# Patient Record
Sex: FEMALE | Race: BLACK OR AFRICAN AMERICAN | NOT HISPANIC OR LATINO | Employment: FULL TIME | URBAN - METROPOLITAN AREA
[De-identification: names, ages, dates, MRNs, and addresses within clinical notes are randomized per-mention and may not be internally consistent; named-entity substitution may affect disease eponyms.]

---

## 2017-01-09 ENCOUNTER — TELEPHONE (OUTPATIENT)
Dept: OBSTETRICS AND GYNECOLOGY | Facility: CLINIC | Age: 32
End: 2017-01-09

## 2017-01-09 DIAGNOSIS — Z30.011 ENCOUNTER FOR INITIAL PRESCRIPTION OF CONTRACEPTIVE PILLS: ICD-10-CM

## 2017-01-09 RX ORDER — LEVONORGESTREL AND ETHINYL ESTRADIOL 0.15-0.03
1 KIT ORAL DAILY
Qty: 90 TABLET | Refills: 0 | Status: SHIPPED | OUTPATIENT
Start: 2017-01-09 | End: 2018-04-26

## 2017-07-11 ENCOUNTER — PATIENT MESSAGE (OUTPATIENT)
Dept: INTERNAL MEDICINE | Facility: CLINIC | Age: 32
End: 2017-07-11

## 2017-07-24 ENCOUNTER — LAB VISIT (OUTPATIENT)
Dept: LAB | Facility: OTHER | Age: 32
End: 2017-07-24
Attending: NURSE PRACTITIONER
Payer: COMMERCIAL

## 2017-07-24 ENCOUNTER — OFFICE VISIT (OUTPATIENT)
Dept: OBSTETRICS AND GYNECOLOGY | Facility: CLINIC | Age: 32
End: 2017-07-24
Payer: COMMERCIAL

## 2017-07-24 VITALS
WEIGHT: 224.88 LBS | SYSTOLIC BLOOD PRESSURE: 118 MMHG | DIASTOLIC BLOOD PRESSURE: 74 MMHG | BODY MASS INDEX: 36.14 KG/M2 | HEIGHT: 66 IN

## 2017-07-24 DIAGNOSIS — Z11.3 SCREENING FOR STDS (SEXUALLY TRANSMITTED DISEASES): ICD-10-CM

## 2017-07-24 DIAGNOSIS — N89.8 VAGINAL DISCHARGE: ICD-10-CM

## 2017-07-24 DIAGNOSIS — Z31.69 ENCOUNTER FOR PRECONCEPTION CONSULTATION: ICD-10-CM

## 2017-07-24 DIAGNOSIS — N92.6 IRREGULAR PERIODS: ICD-10-CM

## 2017-07-24 DIAGNOSIS — Z01.419 WELL WOMAN EXAM WITH ROUTINE GYNECOLOGICAL EXAM: Primary | ICD-10-CM

## 2017-07-24 PROCEDURE — 36415 COLL VENOUS BLD VENIPUNCTURE: CPT

## 2017-07-24 PROCEDURE — 99999 PR PBB SHADOW E&M-EST. PATIENT-LVL III: CPT | Mod: PBBFAC,,, | Performed by: NURSE PRACTITIONER

## 2017-07-24 PROCEDURE — 86592 SYPHILIS TEST NON-TREP QUAL: CPT

## 2017-07-24 PROCEDURE — 87591 N.GONORRHOEAE DNA AMP PROB: CPT

## 2017-07-24 PROCEDURE — 86703 HIV-1/HIV-2 1 RESULT ANTBDY: CPT

## 2017-07-24 PROCEDURE — 87660 TRICHOMONAS VAGIN DIR PROBE: CPT

## 2017-07-24 PROCEDURE — 99395 PREV VISIT EST AGE 18-39: CPT | Mod: S$GLB,,, | Performed by: NURSE PRACTITIONER

## 2017-07-24 PROCEDURE — 87480 CANDIDA DNA DIR PROBE: CPT

## 2017-07-24 PROCEDURE — 80074 ACUTE HEPATITIS PANEL: CPT

## 2017-07-24 NOTE — PROGRESS NOTES
HISTORY OF PRESENT ILLNESS:    Kaylin Vargas is a 32 y.o. female, , Patient's last menstrual period was 2017 (approximate).,  presents for a routine exam, STD screening and irregular periods.  -Stopped her pills once month ago to try to conceive.   -Getting  in September.   -Since stopping her pills, her periods have been unpredictable (has only had one period!).    Past Medical History:   Diagnosis Date    Menstrual migraine     Obesity        PAST SURGICAL HISTORY  History reviewed. No pertinent surgical history.    MEDICATIONS AND ALLERGIES:  None  Review of patient's allergies indicates:  No Known Allergies    Family History   Problem Relation Age of Onset    Diabetes Father     Hypertension Father     Hypertension Mother     Kidney disease Mother     Heart disease Mother     Crohn's disease Sister     Aneurysm Maternal Grandmother     Heart disease Maternal Grandfather     Heart disease Paternal Grandmother     Breast cancer Neg Hx     Colon cancer Neg Hx     Ovarian cancer Neg Hx        Social History     Social History    Marital status: Single     Spouse name: N/A    Number of children: 2    Years of education: N/A     Occupational History    Not on file.     Social History Main Topics    Smoking status: Never Smoker    Smokeless tobacco: Never Used    Alcohol use Yes      Comment: Social.    Drug use: No    Sexual activity: Yes     Partners: Male     Birth control/ protection: Condom, OCP     Other Topics Concern    Not on file     Social History Narrative    No narrative on file       OB HISTORY: Number of vaginal deliveries:2    COMPREHENSIVE GYN HISTORY:  PAP History: Denies abnormal Paps. LAST PAP 16 NORMAL.  Infection History: Denies STDs. Denies PID.  Benign History: Denies uterine fibroids. Denies ovarian cysts. Denies endometriosis. Denies other conditions.  Cancer History: Denies cervical cancer. Denies uterine cancer or hyperplasia. Denies  "ovarian cancer. Denies vulvar cancer or pre-cancer. Denies vaginal cancer or pre-cancer. Denies breast cancer. Denies colon cancer.  Sexual Activity History: Reports currently being sexually active  Menstrual History: Irregular. Mod then light flow.   Dysmenorrhea History: Reports mild dysmenorrhea.   Contraception: None.     ROS:  GENERAL: + WEIGHT GAIN. No swelling. No fatigue. No fever.  CARDIOVASCULAR: No chest pain. No shortness of breath. No leg cramps.   NEUROLOGICAL: + MENSTRUAL MIGRAINES.  BREASTS: No pain. No lumps. No discharge.  ABDOMEN: No pain. No nausea. No vomiting. No diarrhea. No constipation.  REPRODUCTIVE: No abnormal bleeding.   VULVA: No pain. No lesions. No itching.  VAGINA: No relaxation. No itching. No odor. No discharge. No lesions.  URINARY: No incontinence. No nocturia. No frequency. No dysuria.    /74   Ht 5' 6" (1.676 m)   Wt 102 kg (224 lb 13.9 oz)   LMP 06/17/2017 (Approximate)   BMI 36.29 kg/m²     PE:  APPEARANCE: Well nourished, well developed, in no acute distress.  AFFECT: WNL, alert and oriented x 3.  SKIN: No acne or hirsutism.  NECK: Neck symmetric, without masses or thyromegaly.  NODES: No inguinal, cervical, axillary or femoral lymph node enlargement.  CHEST: Good respiratory effort.   ABDOMEN: Soft. No tenderness or masses. OBESE.  BREASTS: Symmetrical, no skin changes, visible lesions, palpable masses or nipple discharge bilaterally.  PELVIC: External female genitalia without lesions.  Female hair distribution. Adequate perineal body, Normal urethral meatus. Vagina moist and well rugated without lesions. MUCOID D/C present.  No significant cystocele or rectocele present. Cervix pink without lesions, discharge or tenderness. Uterus is 8 week size, regular, mobile and nontender. Adnexa without masses or tenderness. EXAM DIFFICULT DUE TO BODY HABITUS.  EXTREMITIES: No edema    DIAGNOSIS:  1. Well woman exam with routine gynecological exam    2. Irregular periods  "   3. Encounter for preconception consultation    4. Vaginal discharge    5. Screening for STDs (sexually transmitted diseases)        PLAN:    Orders Placed This Encounter    C. trachomatis/N. gonorrhoeae by AMP DNA Cervix    Vaginosis Screen by DNA Probe    HIV-1 and HIV-2 antibodies    RPR    Hepatitis panel, acute       COUNSELING:  The patient was counseled today on:  -to wait another 3 months to see if her periods will regulate, if not, to call and I will Rx Prometrium;  -procreation and future pregnancy, including optimal timing for becoming pregnant, use of LH kits and prenatal vitamins that contain folate and iron, avoidance of alcohol and caffeine, to review her immunization history and to update as necessary, to review her family history for potential inherited diseases that would require  screening;  -weight loss encouraged;  -STDs and prevention;  -A.C.S. Pap and pelvic exam guidelines (pap every 3 years);  -to follow up with her PCP for other health maintenance.    FOLLOW-UP with me pending test results and annually.

## 2017-07-25 LAB
C TRACH DNA SPEC QL NAA+PROBE: NOT DETECTED
CANDIDA RRNA VAG QL PROBE: NEGATIVE
G VAGINALIS RRNA GENITAL QL PROBE: NEGATIVE
HAV IGM SERPL QL IA: NEGATIVE
HBV CORE IGM SERPL QL IA: NEGATIVE
HBV SURFACE AG SERPL QL IA: NEGATIVE
HCV AB SERPL QL IA: NEGATIVE
HIV 1+2 AB+HIV1 P24 AG SERPL QL IA: NEGATIVE
N GONORRHOEA DNA SPEC QL NAA+PROBE: NOT DETECTED
RPR SER QL: NORMAL
T VAGINALIS RRNA GENITAL QL PROBE: NEGATIVE

## 2018-01-18 ENCOUNTER — TELEPHONE (OUTPATIENT)
Dept: DERMATOLOGY | Facility: CLINIC | Age: 33
End: 2018-01-18

## 2018-01-18 NOTE — TELEPHONE ENCOUNTER
LVM for pt to return call to r/s appt when MD is in clinic but still leave on NP schedule. (Rash/Bumps)

## 2018-01-25 ENCOUNTER — OFFICE VISIT (OUTPATIENT)
Dept: DERMATOLOGY | Facility: CLINIC | Age: 33
End: 2018-01-25
Payer: COMMERCIAL

## 2018-01-25 DIAGNOSIS — L70.0 ACNE VULGARIS: Primary | ICD-10-CM

## 2018-01-25 DIAGNOSIS — L73.0 ACNE KELOIDALIS NUCHAE: ICD-10-CM

## 2018-01-25 PROCEDURE — 99999 PR PBB SHADOW E&M-EST. PATIENT-LVL II: CPT | Mod: PBBFAC,,, | Performed by: DERMATOLOGY

## 2018-01-25 PROCEDURE — 99202 OFFICE O/P NEW SF 15 MIN: CPT | Mod: 25,S$GLB,, | Performed by: DERMATOLOGY

## 2018-01-25 PROCEDURE — 11900 INJECT SKIN LESIONS </W 7: CPT | Mod: S$GLB,,, | Performed by: DERMATOLOGY

## 2018-01-25 RX ORDER — DOXYCYCLINE 100 MG/1
TABLET ORAL
Qty: 30 TABLET | Refills: 2 | Status: SHIPPED | OUTPATIENT
Start: 2018-01-25 | End: 2018-07-26

## 2018-01-25 RX ORDER — ADAPALENE AND BENZOYL PEROXIDE 3; 25 MG/G; MG/G
GEL TOPICAL
Qty: 45 G | Refills: 3 | Status: SHIPPED | OUTPATIENT
Start: 2018-01-25 | End: 2018-10-03 | Stop reason: SDUPTHER

## 2018-01-25 NOTE — PATIENT INSTRUCTIONS

## 2018-01-25 NOTE — PROGRESS NOTES
Subjective:       Patient ID:  Kaylin Vargas is a 32 y.o. female who presents for   Chief Complaint   Patient presents with    Acne     face, chest and back    Lesion     back of neck     Method of birth control: none      Acne  - Initial  Affected locations: face, back and chest  Duration: 15 years  Signs / symptoms: draining and tender  Severity: moderate  Aggravated by: menses  Relieving factors/Treatments tried: OTC acne medication (benzoyl peroxide)  Improvement on treatment: no relief    Lesion  - Initial  Affected locations: scalp  Duration: 5 years  Signs / symptoms: irritated, oozing, bleeding and pain  Severity: moderate  Timing: constant  Aggravated by: nothing  Relieving factors/Treatments tried: OTC acne medication ([place]reeze)  Improvement on treatment: mild        Review of Systems   Constitutional: Negative for fever and chills.   Gastrointestinal: Negative for Sensitivity to oral antibiotics.   Genitourinary: Negative for irregular periods (not on ocp).   Skin: Positive for rash. Negative for itching, daily sunscreen use, activity-related sunscreen use and recent sunburn.        Objective:    Physical Exam   Constitutional: She appears well-developed and well-nourished. She is obese.  No distress.   Neurological: She is alert and oriented to person, place, and time. She is not disoriented.   Psychiatric: She has a normal mood and affect.   Skin:   Areas Examined (abnormalities noted in diagram):   Scalp / Hair Palpated and Inspected  Head / Face Inspection Performed  Neck Inspection Performed  Chest / Axilla Inspection Performed  Back Inspection Performed  RUE Inspected  LUE Inspection Performed                   Diagram Legend     Erythematous scaling macule/papule c/w actinic keratosis       Vascular papule c/w angioma      Pigmented verrucoid papule/plaque c/w seborrheic keratosis      Yellow umbilicated papule c/w sebaceous hyperplasia      Irregularly shaped tan macule c/w lentigo      1-2 mm smooth white papules consistent with Milia      Movable subcutaneous cyst with punctum c/w epidermal inclusion cyst      Subcutaneous movable cyst c/w pilar cyst      Firm pink to brown papule c/w dermatofibroma      Pedunculated fleshy papule(s) c/w skin tag(s)      Evenly pigmented macule c/w junctional nevus     Mildly variegated pigmented, slightly irregular-bordered macule c/w mildly atypical nevus      Flesh colored to evenly pigmented papule c/w intradermal nevus       Pink pearly papule/plaque c/w basal cell carcinoma      Erythematous hyperkeratotic cursted plaque c/w SCC      Surgical scar with no sign of skin cancer recurrence      Open and closed comedones      Inflammatory papules and pustules      Verrucoid papule consistent consistent with wart     Erythematous eczematous patches and plaques     Dystrophic onycholytic nail with subungual debris c/w onychomycosis     Umbilicated papule    Erythematous-base heme-crusted tan verrucoid plaque consistent with inflamed seborrheic keratosis     Erythematous Silvery Scaling Plaque c/w Psoriasis     See annotation      Assessment / Plan:        Acne vulgaris  EPIDUO FORTE 0.3-2.5 % GlwP; AAA face qhs  Dispense: 45 g; Refill: 3  Intralesional Kenalog 40mg/cc (1.0 cc total) injected into 1 lesions on the post scalp today after obtaining verbal consent including risk of surrounding hypopigmentation. Patient tolerated procedure well.    NDC for Kenalog 40mg/cc:  7386-5287-89; 4 units      Acne keloidalis nuchae  -     doxycycline monohydrate 100 mg Tab; Take 1 po qday  Dispense: 30 tablet; Refill: 2    -     -     benzoyl peroxide (BENZEPRO) 6 % Towl; Use daily in shower to trunk/neck  Dispense: 1 each; Refill: 5             Follow-up in about 4 weeks (around 2/22/2018) for with Derm Nurse Practioner (GP).

## 2018-01-29 ENCOUNTER — TELEPHONE (OUTPATIENT)
Dept: PHARMACY | Facility: CLINIC | Age: 33
End: 2018-01-29

## 2018-02-21 ENCOUNTER — OFFICE VISIT (OUTPATIENT)
Dept: URGENT CARE | Facility: CLINIC | Age: 33
End: 2018-02-21
Payer: COMMERCIAL

## 2018-02-21 VITALS
OXYGEN SATURATION: 98 % | DIASTOLIC BLOOD PRESSURE: 81 MMHG | RESPIRATION RATE: 20 BRPM | HEART RATE: 80 BPM | HEIGHT: 67 IN | SYSTOLIC BLOOD PRESSURE: 123 MMHG | WEIGHT: 210 LBS | BODY MASS INDEX: 32.96 KG/M2 | TEMPERATURE: 99 F

## 2018-02-21 DIAGNOSIS — R05.9 COUGH: ICD-10-CM

## 2018-02-21 DIAGNOSIS — J06.9 UPPER RESPIRATORY TRACT INFECTION, UNSPECIFIED TYPE: ICD-10-CM

## 2018-02-21 DIAGNOSIS — J02.9 SORE THROAT: Primary | ICD-10-CM

## 2018-02-21 LAB
CTP QC/QA: YES
CTP QC/QA: YES
FLUAV AG NPH QL: NEGATIVE
FLUBV AG NPH QL: NEGATIVE
S PYO RRNA THROAT QL PROBE: NEGATIVE

## 2018-02-21 PROCEDURE — 87804 INFLUENZA ASSAY W/OPTIC: CPT | Mod: 59,QW,S$GLB, | Performed by: EMERGENCY MEDICINE

## 2018-02-21 PROCEDURE — 87880 STREP A ASSAY W/OPTIC: CPT | Mod: QW,S$GLB,, | Performed by: EMERGENCY MEDICINE

## 2018-02-21 PROCEDURE — 3008F BODY MASS INDEX DOCD: CPT | Mod: S$GLB,,, | Performed by: EMERGENCY MEDICINE

## 2018-02-21 PROCEDURE — 99203 OFFICE O/P NEW LOW 30 MIN: CPT | Mod: S$GLB,,, | Performed by: EMERGENCY MEDICINE

## 2018-02-21 NOTE — PROGRESS NOTES
"Subjective:       Patient ID: Kaylin Vargas is a 32 y.o. female.    Vitals:  height is 5' 7" (1.702 m) and weight is 95.3 kg (210 lb). Her oral temperature is 99.1 °F (37.3 °C). Her blood pressure is 123/81 and her pulse is 80. Her respiration is 20 and oxygen saturation is 98%.     Chief Complaint: Sinus Problem; Cough; and Sore Throat    Pt sick for 4 days and missed work. Needs a note. Mild diarrhea No vomiting. Slight sore throat and runny nose. She did get the flu shot      Sinus Problem   This is a new problem. The current episode started 1 to 4 weeks ago. The problem has been gradually improving since onset. There has been no fever. The fever has been present for less than 1 day. Her pain is at a severity of 5/10. The pain is moderate. Associated symptoms include chills, congestion, coughing, ear pain, headaches, a hoarse voice, sinus pressure, sneezing and a sore throat. Pertinent negatives include no shortness of breath. Treatments tried: Zyrtec. The treatment provided mild relief.   Cough   This is a new problem. The current episode started in the past 7 days. The problem has been gradually improving. The problem occurs every few hours. The cough is non-productive. Associated symptoms include chills, ear congestion, ear pain, headaches, myalgias, nasal congestion and a sore throat. Pertinent negatives include no chest pain, eye redness, fever, shortness of breath or wheezing. Nothing aggravates the symptoms. She has tried nothing for the symptoms. The treatment provided no relief.   Sore Throat    This is a new problem. The current episode started in the past 7 days. The problem has been gradually improving. Sore throat worse side: Both sides. There has been no fever. The fever has been present for less than 1 day. The pain is at a severity of 6/10. The pain is moderate. Associated symptoms include congestion, coughing, ear pain, headaches, a hoarse voice and trouble swallowing. Pertinent " negatives include no abdominal pain or shortness of breath. She has tried nothing for the symptoms. The treatment provided no relief.     Review of Systems   Constitution: Positive for chills and malaise/fatigue. Negative for fever.   HENT: Positive for congestion, ear pain, hoarse voice, sinus pressure, sneezing, sore throat and trouble swallowing.    Eyes: Negative for discharge and redness.   Cardiovascular: Negative for chest pain, dyspnea on exertion and leg swelling.   Respiratory: Positive for cough. Negative for shortness of breath, sputum production and wheezing.    Musculoskeletal: Positive for myalgias.   Gastrointestinal: Positive for nausea. Negative for abdominal pain.   Neurological: Positive for headaches.       Objective:      Physical Exam   Constitutional: She is oriented to person, place, and time. She appears well-developed and well-nourished. She is cooperative.  Non-toxic appearance. She does not appear ill. No distress.   HENT:   Head: Normocephalic and atraumatic.   Right Ear: Hearing, tympanic membrane, external ear and ear canal normal.   Left Ear: Hearing, tympanic membrane, external ear and ear canal normal.   Nose: Mucosal edema and rhinorrhea present. No nasal deformity. No epistaxis. Right sinus exhibits no maxillary sinus tenderness and no frontal sinus tenderness. Left sinus exhibits no maxillary sinus tenderness and no frontal sinus tenderness.   Mouth/Throat: Uvula is midline, oropharynx is clear and moist and mucous membranes are normal. No trismus in the jaw. Normal dentition. No uvula swelling. No posterior oropharyngeal erythema. Tonsils are 1+ on the right. Tonsils are 1+ on the left.   Eyes: Conjunctivae, EOM and lids are normal. Pupils are equal, round, and reactive to light. No scleral icterus.   Sclera clear bilat   Neck: Trachea normal, full passive range of motion without pain and phonation normal. Neck supple.   Cardiovascular: Normal rate, regular rhythm, normal heart  sounds, intact distal pulses and normal pulses.    Pulmonary/Chest: Effort normal and breath sounds normal. No respiratory distress.   Abdominal: Soft. Normal appearance and bowel sounds are normal. She exhibits no distension. There is no tenderness.   Musculoskeletal: Normal range of motion. She exhibits no edema or deformity.   Neurological: She is alert and oriented to person, place, and time. She exhibits normal muscle tone. Coordination normal.   Skin: Skin is warm, dry and intact. She is not diaphoretic. No pallor.   Psychiatric: She has a normal mood and affect. Her speech is normal and behavior is normal. Judgment and thought content normal. Cognition and memory are normal.   Nursing note and vitals reviewed.    Neg flu A Neg Flu B Neg strept  Assessment:       1. Sore throat    2. Cough    3. Upper respiratory tract infection, unspecified type        Plan:         Sore throat  -     POCT rapid strep A    Cough  -     POCT Influenza A/B    Upper respiratory tract infection, unspecified type

## 2018-02-21 NOTE — PATIENT INSTRUCTIONS
Viral Upper Respiratory Illness (Adult)  You have a viral upper respiratory illness (URI), which is another term for the common cold. This illness is contagious during the first few days. It is spread through the air by coughing and sneezing. It may also be spread by direct contact (touching the sick person and then touching your own eyes, nose, or mouth). Frequent handwashing will decrease risk of spread. Most viral illnesses go away within 7 to 10 days with rest and simple home remedies. Sometimes the illness may last for several weeks. Antibiotics will not kill a virus, and they are generally not prescribed for this condition.    Home care  · If symptoms are severe, rest at home for the first 2 to 3 days. When you resume activity, don't let yourself get too tired.  · Avoid being exposed to cigarette smoke (yours or others).  · You may use acetaminophen or ibuprofen to control pain and fever, unless another medicine was prescribed. (Note: If you have chronic liver or kidney disease, have ever had a stomach ulcer or gastrointestinal bleeding, or are taking blood-thinning medicines, talk with your healthcare provider before using these medicines.) Aspirin should never be given to anyone under 18 years of age who is ill with a viral infection or fever. It may cause severe liver or brain damage.  · Your appetite may be poor, so a light diet is fine. Avoid dehydration by drinking 6 to 8 glasses of fluids per day (water, soft drinks, juices, tea, or soup). Extra fluids will help loosen secretions in the nose and lungs.  · Over-the-counter cold medicines will not shorten the length of time youre sick, but they may be helpful for the following symptoms: cough, sore throat, and nasal and sinus congestion. (Note: Do not use decongestants if you have high blood pressure.)  Follow-up care  Follow up with your healthcare provider, or as advised.  When to seek medical advice  Call your healthcare provider right away if any  of these occur:  · Cough with lots of colored sputum (mucus)  · Severe headache; face, neck, or ear pain  · Difficulty swallowing due to throat pain  · Fever of 100.4°F (38°C)  Call 911, or get immediate medical care  Call emergency services right away if any of these occur:  · Chest pain, shortness of breath, wheezing, or difficulty breathing  · Coughing up blood  · Inability to swallow due to throat pain  Date Last Reviewed: 9/13/2015  © 2556-5106 [employer]. [address] [place] [id number]. All rights reserved. This information is not intended as a substitute for professional medical care. Always follow your healthcare professional's instructions.

## 2018-02-21 NOTE — LETTER
February 21, 2018      [facility]  [address] [address]  Phone: [phone]  Fax: [phone]       Patient: Kaylin Vargas   YOB: 1985  Date of Visit: 02/21/2018    To Whom It May Concern:    Kimberly Vargas  was at [facility] on 02/21/2018 . She was seen here today  For a URI and can return to work tomorrow 2/22/18.  If you have any questions or concerns, or if I can be of further assistance, please do not hesitate to contact me.    Sincerely,    Christin Jeong MD

## 2018-04-26 ENCOUNTER — TELEPHONE (OUTPATIENT)
Dept: OBSTETRICS AND GYNECOLOGY | Facility: CLINIC | Age: 33
End: 2018-04-26

## 2018-04-26 ENCOUNTER — OFFICE VISIT (OUTPATIENT)
Dept: OBSTETRICS AND GYNECOLOGY | Facility: CLINIC | Age: 33
End: 2018-04-26
Payer: COMMERCIAL

## 2018-04-26 VITALS
BODY MASS INDEX: 38.42 KG/M2 | WEIGHT: 230.63 LBS | DIASTOLIC BLOOD PRESSURE: 72 MMHG | HEIGHT: 65 IN | SYSTOLIC BLOOD PRESSURE: 120 MMHG

## 2018-04-26 DIAGNOSIS — Z01.419 WELL WOMAN EXAM WITH ROUTINE GYNECOLOGICAL EXAM: Primary | ICD-10-CM

## 2018-04-26 DIAGNOSIS — Z31.69 ENCOUNTER FOR PRECONCEPTION CONSULTATION: ICD-10-CM

## 2018-04-26 PROCEDURE — 99999 PR PBB SHADOW E&M-EST. PATIENT-LVL III: CPT | Mod: PBBFAC,,, | Performed by: NURSE PRACTITIONER

## 2018-04-26 PROCEDURE — 99395 PREV VISIT EST AGE 18-39: CPT | Mod: S$GLB,,, | Performed by: NURSE PRACTITIONER

## 2018-04-26 PROCEDURE — 88175 CYTOPATH C/V AUTO FLUID REDO: CPT

## 2018-04-26 NOTE — PROGRESS NOTES
HISTORY OF PRESENT ILLNESS:    Kaylin Vargas is a 33 y.o. female, , Patient's last menstrual period was 2018 (exact date).,  presents for a routine exam and  -Stopped OCPs 4-5 months ago and trying to conceive.  -Periods are regular, moderate flow, 4 days, mild cramps and no more menstrual migraines.  -She has children, but not with current .  -He has no health problems, but is > age 40.     Past Medical History:   Diagnosis Date    Acne     Obesity        PAST SURGICAL HISTORY:  History reviewed. No pertinent surgical history.    MEDICATIONS AND ALLERGIES:      Current Outpatient Prescriptions:     benzoyl peroxide (BENZEPRO) 6 % Towl, Use daily in shower to trunk/neck, Disp: 1 each, Rfl: 5    doxycycline monohydrate 100 mg Tab, Take 1 po qday, Disp: 30 tablet, Rfl: 2    EPIDUO FORTE 0.3-2.5 % GlwP, AAA face qhs, Disp: 45 g, Rfl: 3    Review of patient's allergies indicates:  No Known Allergies    Family History   Problem Relation Age of Onset    Diabetes Father     Hypertension Father     Hypertension Mother     Kidney disease Mother     Heart disease Mother     Crohn's disease Sister     Aneurysm Maternal Grandmother     Heart disease Maternal Grandfather     Heart disease Paternal Grandmother     Breast cancer Neg Hx     Colon cancer Neg Hx     Ovarian cancer Neg Hx     Melanoma Neg Hx        Social History     Social History    Marital status: Single     Spouse name: N/A    Number of children: 2    Years of education: N/A     Occupational History    Not on file.     Social History Main Topics    Smoking status: Never Smoker    Smokeless tobacco: Never Used    Alcohol use Yes      Comment: Social.    Drug use: No    Sexual activity: Yes     Partners: Male     Birth control/ protection: Condom, OCP     Other Topics Concern    Not on file     Social History Narrative    No narrative on file       OB HISTORY: Number of vaginal deliveries:2     COMPREHENSIVE  "GYN HISTORY:  PAP History: Denies abnormal Paps. LAST PAP 1-25-16 NORMAL.  Infection History: Denies STDs. Denies PID.  Benign History: Denies uterine fibroids. Denies ovarian cysts. Denies endometriosis. Denies other conditions.  Cancer History: Denies cervical cancer. Denies uterine cancer or hyperplasia. Denies ovarian cancer. Denies vulvar cancer or pre-cancer. Denies vaginal cancer or pre-cancer. Denies breast cancer. Denies colon cancer.  Sexual Activity History: Reports currently being sexually active  Menstrual History:Monthly. Mod flow. 3-4 days.  Dysmenorrhea History: Reports mild dysmenorrhea.   Contraception: None.     ROS:  GENERAL: + WEIGHT GAIN. No swelling. No fatigue. No fever.  CARDIOVASCULAR: No chest pain. No shortness of breath. No leg cramps.   NEUROLOGICAL: + MENSTRUAL MIGRAINES.  BREASTS: No pain. No lumps. No discharge.  ABDOMEN: No pain. No nausea. No vomiting. No diarrhea. No constipation.  REPRODUCTIVE: No abnormal bleeding.   VULVA: No pain. No lesions. No itching.  VAGINA: No relaxation. No itching. No odor. No discharge. No lesions.  URINARY: No incontinence. No nocturia. No frequency. No dysuria.    /72   Ht 5' 5" (1.651 m)   Wt 104.6 kg (230 lb 9.6 oz)   LMP 03/26/2018 (Exact Date)   BMI 38.37 kg/m²     PE:  APPEARANCE: Well nourished, well developed, in no acute distress.  AFFECT: WNL, alert and oriented x 3.  SKIN: No acne or hirsutism.  NECK: Neck symmetric, without masses or thyromegaly.  NODES: No inguinal, cervical, axillary or femoral lymph node enlargement.  CHEST: Good respiratory effort.   ABDOMEN: Soft. No tenderness or masses. OBESE.  BREASTS: Symmetrical, no skin changes, visible lesions, palpable masses or nipple discharge bilaterally.  PELVIC: External female genitalia without lesions.  Female hair distribution. Adequate perineal body, Normal urethral meatus. Vagina moist and well rugated without lesions or discharge.  No significant cystocele or rectocele " present. Cervix pink without lesions, discharge or tenderness. Uterus is 4-6 week size, regular, mobile and nontender. Adnexa without masses or tenderness. EXAM DIFFICULT DUE TO BODY HABITUS.  EXTREMITIES: No edema    DIAGNOSIS:  1. Well woman exam with routine gynecological exam    2. Encounter for preconception consultation        PLAN:    Orders Placed This Encounter    Liquid-based pap smear, screening   Declined STD testing    COUNSELING:  The patient was counseled today on:  -procreation and future pregnancy, including optimal timing for becoming pregnant, use of prenatal vitamins that contain folate and iron, avoidance of alcohol and caffeine during early pregnancy, plus the negative effects of smoking on fecundity and on development of an early pregnancy, to review her immunization history and to update as necessary, to review her family history for potential inherited diseases that would require  screening.  -that if she is not pregnant in 3 months with using LH kits, then  to get a S.A. And gave her the name, number and pamphlet for [facility] for a consult;  -that at age 34 her fertility declines, she may be more at risk for miscarriage, baby with chromosome abnormality, etc;  -A.C.S. Pap and pelvic exam guidelines (pap every 3 years), recomendations for yearly mammogram;  -to follow up with her PCP for other health maintenance.    FOLLOW-UP with me annually.

## 2018-07-26 ENCOUNTER — LAB VISIT (OUTPATIENT)
Dept: LAB | Facility: HOSPITAL | Age: 33
End: 2018-07-26
Attending: INTERNAL MEDICINE
Payer: COMMERCIAL

## 2018-07-26 ENCOUNTER — PATIENT MESSAGE (OUTPATIENT)
Dept: INTERNAL MEDICINE | Facility: CLINIC | Age: 33
End: 2018-07-26

## 2018-07-26 ENCOUNTER — OFFICE VISIT (OUTPATIENT)
Dept: INTERNAL MEDICINE | Facility: CLINIC | Age: 33
End: 2018-07-26
Payer: COMMERCIAL

## 2018-07-26 VITALS
BODY MASS INDEX: 38.2 KG/M2 | SYSTOLIC BLOOD PRESSURE: 106 MMHG | HEART RATE: 83 BPM | DIASTOLIC BLOOD PRESSURE: 80 MMHG | HEIGHT: 65 IN | WEIGHT: 229.31 LBS

## 2018-07-26 DIAGNOSIS — Z83.3 FAMILY HISTORY OF TYPE 2 DIABETES MELLITUS: ICD-10-CM

## 2018-07-26 DIAGNOSIS — Z00.00 ROUTINE MEDICAL EXAM: ICD-10-CM

## 2018-07-26 DIAGNOSIS — E66.9 OBESITY (BMI 35.0-39.9 WITHOUT COMORBIDITY): ICD-10-CM

## 2018-07-26 DIAGNOSIS — Z00.00 ROUTINE MEDICAL EXAM: Primary | ICD-10-CM

## 2018-07-26 LAB
ALBUMIN SERPL BCP-MCNC: 3.4 G/DL
ALP SERPL-CCNC: 52 U/L
ALT SERPL W/O P-5'-P-CCNC: 13 U/L
ANION GAP SERPL CALC-SCNC: 4 MMOL/L
AST SERPL-CCNC: 14 U/L
BASOPHILS # BLD AUTO: 0.03 K/UL
BASOPHILS NFR BLD: 0.3 %
BILIRUB SERPL-MCNC: 0.5 MG/DL
BUN SERPL-MCNC: 10 MG/DL
CALCIUM SERPL-MCNC: 9 MG/DL
CHLORIDE SERPL-SCNC: 108 MMOL/L
CHOLEST SERPL-MCNC: 134 MG/DL
CHOLEST/HDLC SERPL: 3.3 {RATIO}
CO2 SERPL-SCNC: 25 MMOL/L
CREAT SERPL-MCNC: 0.9 MG/DL
DIFFERENTIAL METHOD: NORMAL
EOSINOPHIL # BLD AUTO: 0.1 K/UL
EOSINOPHIL NFR BLD: 0.9 %
ERYTHROCYTE [DISTWIDTH] IN BLOOD BY AUTOMATED COUNT: 14.2 %
EST. GFR  (AFRICAN AMERICAN): >60 ML/MIN/1.73 M^2
EST. GFR  (NON AFRICAN AMERICAN): >60 ML/MIN/1.73 M^2
ESTIMATED AVG GLUCOSE: 103 MG/DL
GLUCOSE SERPL-MCNC: 85 MG/DL
HBA1C MFR BLD HPLC: 5.2 %
HCT VFR BLD AUTO: 37.8 %
HDLC SERPL-MCNC: 41 MG/DL
HDLC SERPL: 30.6 %
HGB BLD-MCNC: 12.4 G/DL
LDLC SERPL CALC-MCNC: 81.4 MG/DL
LYMPHOCYTES # BLD AUTO: 3.4 K/UL
LYMPHOCYTES NFR BLD: 28.5 %
MCH RBC QN AUTO: 29.6 PG
MCHC RBC AUTO-ENTMCNC: 32.8 G/DL
MCV RBC AUTO: 90 FL
MONOCYTES # BLD AUTO: 0.8 K/UL
MONOCYTES NFR BLD: 6.3 %
NEUTROPHILS # BLD AUTO: 7.5 K/UL
NEUTROPHILS NFR BLD: 63.8 %
NONHDLC SERPL-MCNC: 93 MG/DL
PLATELET # BLD AUTO: 302 K/UL
PMV BLD AUTO: 11.3 FL
POTASSIUM SERPL-SCNC: 4.3 MMOL/L
PROT SERPL-MCNC: 7.3 G/DL
RBC # BLD AUTO: 4.19 M/UL
SODIUM SERPL-SCNC: 137 MMOL/L
TRIGL SERPL-MCNC: 58 MG/DL
WBC # BLD AUTO: 11.82 K/UL

## 2018-07-26 PROCEDURE — 99395 PREV VISIT EST AGE 18-39: CPT | Mod: S$GLB,,, | Performed by: INTERNAL MEDICINE

## 2018-07-26 PROCEDURE — 83036 HEMOGLOBIN GLYCOSYLATED A1C: CPT

## 2018-07-26 PROCEDURE — 36415 COLL VENOUS BLD VENIPUNCTURE: CPT

## 2018-07-26 PROCEDURE — 99999 PR PBB SHADOW E&M-EST. PATIENT-LVL III: CPT | Mod: PBBFAC,,, | Performed by: INTERNAL MEDICINE

## 2018-07-26 PROCEDURE — 80061 LIPID PANEL: CPT

## 2018-07-26 PROCEDURE — 80053 COMPREHEN METABOLIC PANEL: CPT

## 2018-07-26 PROCEDURE — 85025 COMPLETE CBC W/AUTO DIFF WBC: CPT

## 2018-07-26 RX ORDER — PHENTERMINE HYDROCHLORIDE 37.5 MG/1
37.5 TABLET ORAL
Qty: 30 TABLET | Refills: 2 | Status: SHIPPED | OUTPATIENT
Start: 2018-07-26 | End: 2019-10-10

## 2018-07-26 NOTE — PROGRESS NOTES
Subjective:       Patient ID: Kaylin Vargas is a 33 y.o. female.    Chief Complaint: Follow-up (discuss weight loss options)    Seen once to establish care 2 and a half years ago, doing well at that time. Returns for annual exam requesting assistance in managing obesity. She has made some changes in her diet in attempt to reduce calorie intake, but still has some bad habits like eating a lot of bread, skipping meals, then overeating. Could use some guidance, and is agreeable to trying medication to help curb her appetite. Has also given some thought to bariatric surgery, but not ready to consider this just yet.      PMH: .   Obesity.  Menstrual Migraines.     PSH: None.     Flu shot . Tetanus . No recent Eye exam. Pap normal , just noticed (after our visit) she expressed interest in GETTING PREGNANT at that time.    Social: Non-smoker, social alcohol.  with two adolescent children. Works here as a central supply tech.     FMH: Mother developed HTN as a young adult, ESRD on peritoneal dialysis,  four months ago at age 61 of anoxic brain injury after cardiac arrest. Father living age 81 with HTN, DM and stroke. Sister with Crohn's disease. Heart dis and brain aneurysm in grandparents.    NKDA.    Medications: None including no contraception.          Review of Systems   Constitutional: Negative for activity change, appetite change, fatigue, fever and unexpected weight change.   HENT: Negative for congestion, hearing loss, rhinorrhea, sneezing, sore throat, trouble swallowing and voice change.    Eyes: Negative for pain and visual disturbance.   Respiratory: Negative for cough, chest tightness, shortness of breath and wheezing.    Cardiovascular: Negative for chest pain, palpitations and leg swelling.   Gastrointestinal: Negative for abdominal pain, blood in stool, constipation, diarrhea, nausea and vomiting.   Genitourinary: Negative for difficulty urinating, dysuria, flank pain,  "frequency, hematuria, menstrual problem and urgency.   Musculoskeletal: Negative for arthralgias, back pain, joint swelling, myalgias and neck pain.   Skin: Negative for color change and rash.   Neurological: Negative for dizziness, syncope, facial asymmetry, speech difficulty, weakness, numbness and headaches.   Hematological: Negative for adenopathy. Does not bruise/bleed easily.   Psychiatric/Behavioral: Negative for agitation, dysphoric mood and sleep disturbance. The patient is not nervous/anxious.        Objective:    /80, Pulse 83, Ht 5' 5", Wt 229 lbs (stable), BMI=38  Physical Exam   Constitutional: She is oriented to person, place, and time. She appears well-developed and well-nourished. No distress.   HENT:   Head: Normocephalic and atraumatic.   Right Ear: External ear normal.   Left Ear: External ear normal.   Nose: Nose normal.   Mouth/Throat: Oropharynx is clear and moist. No oropharyngeal exudate.   Eyes: Conjunctivae and EOM are normal. Pupils are equal, round, and reactive to light. Right conjunctiva is not injected. Left conjunctiva is not injected. No scleral icterus.   Neck: Normal range of motion. Neck supple. No JVD present. Carotid bruit is not present. No thyromegaly present.   Cardiovascular: Normal rate, regular rhythm, normal heart sounds and intact distal pulses.  Exam reveals no gallop and no friction rub.    No murmur heard.  Pulmonary/Chest: Effort normal and breath sounds normal. No respiratory distress. She has no wheezes. She has no rhonchi. She has no rales.   Abdominal: Soft. Bowel sounds are normal. She exhibits no distension and no mass. There is no hepatosplenomegaly. There is no tenderness. There is no CVA tenderness.   Musculoskeletal: Normal range of motion. She exhibits no edema, tenderness or deformity.   Lymphadenopathy:     She has no cervical adenopathy.   Neurological: She is alert and oriented to person, place, and time. She has normal strength and normal " reflexes. No cranial nerve deficit. She exhibits normal muscle tone. Coordination and gait normal.   Skin: Skin is warm and dry. No lesion and no rash noted. She is not diaphoretic. No cyanosis or erythema. No pallor. Nails show no clubbing.   Psychiatric: She has a normal mood and affect. Her behavior is normal. Thought content normal.   Vitals reviewed.      Assessment:       1. Routine medical exam    2. Family history of type 2 diabetes mellitus    3. Obesity (BMI 35.0-39.9 without comorbidity)        Plan:       Routine medical exam  -     CBC auto differential; Future; Expected date: 07/26/2018  -     Comprehensive metabolic panel; Future; Expected date: 07/26/2018  -     Lipid panel; Future; Expected date: 07/26/2018    Family history of type 2 diabetes mellitus  -     Hemoglobin A1c; Future; Expected date: 07/26/2018    Obesity (BMI 35.0-39.9 without comorbidity)  -     Ambulatory Referral to Bariatric Medicine  -     phentermine (ADIPEX-P) 37.5 mg tablet; Take 1 tablet (37.5 mg total) by mouth before breakfast.  Dispense: 30 tablet; Refill: 2 - DO NOT USE if trying to conceive, reliable contraception recommended.

## 2018-09-26 ENCOUNTER — INITIAL CONSULT (OUTPATIENT)
Dept: BARIATRICS | Facility: CLINIC | Age: 33
End: 2018-09-26
Payer: COMMERCIAL

## 2018-09-26 VITALS
DIASTOLIC BLOOD PRESSURE: 84 MMHG | HEART RATE: 83 BPM | BODY MASS INDEX: 38.28 KG/M2 | WEIGHT: 229.75 LBS | HEIGHT: 65 IN | SYSTOLIC BLOOD PRESSURE: 138 MMHG

## 2018-09-26 DIAGNOSIS — E66.9 OBESITY (BMI 35.0-39.9 WITHOUT COMORBIDITY): Primary | ICD-10-CM

## 2018-09-26 DIAGNOSIS — R29.818 SUSPECTED SLEEP APNEA: ICD-10-CM

## 2018-09-26 PROCEDURE — 3008F BODY MASS INDEX DOCD: CPT | Mod: CPTII,S$GLB,, | Performed by: NURSE PRACTITIONER

## 2018-09-26 PROCEDURE — 99999 PR PBB SHADOW E&M-EST. PATIENT-LVL III: CPT | Mod: PBBFAC,,, | Performed by: NURSE PRACTITIONER

## 2018-09-26 PROCEDURE — 99205 OFFICE O/P NEW HI 60 MIN: CPT | Mod: S$GLB,,, | Performed by: NURSE PRACTITIONER

## 2018-09-26 NOTE — PROGRESS NOTES
BARIATRIC NEW PATIENT EVALUATION    CHIEF COMPLAINT:   Morbid Obesity Body mass index is 38.23 kg/m². and difficulty with weight loss.     HISTORY OF PRESENT ILLNESS:  Kaylin Vargas  is a 33 y.o. female presenting for morbid obesity Body mass index is 38.23 kg/m². and difficulty with weight loss. The patient has tried Adipex, 7 day cleanser, Apple Cider Vinegar, fasting. She was able to lose 2 pounds with fasting. Wt difficulty began with having children. Her highest wt is 230 pounds.    PAST MEDICAL HISTORY:  Past Medical History:   Diagnosis Date    Acne     Obesity      PAST SURGICAL HISTORY:  History reviewed. No pertinent surgical history.    FAMILY HISTORY:  Family History   Problem Relation Age of Onset    Diabetes Father     Hypertension Father     Hypertension Mother     Kidney disease Mother     Heart disease Mother     Crohn's disease Sister     Aneurysm Maternal Grandmother     Heart disease Maternal Grandfather     Heart disease Paternal Grandmother     Breast cancer Neg Hx     Colon cancer Neg Hx     Ovarian cancer Neg Hx     Melanoma Neg Hx        SOCIAL HISTORY:  Social History     Socioeconomic History    Marital status: Single     Spouse name: Not on file    Number of children: 2    Years of education: Not on file    Highest education level: Not on file   Social Needs    Financial resource strain: Not on file    Food insecurity - worry: Not on file    Food insecurity - inability: Not on file    Transportation needs - medical: Not on file    Transportation needs - non-medical: Not on file   Occupational History    Not on file   Tobacco Use    Smoking status: Never Smoker    Smokeless tobacco: Never Used   Substance and Sexual Activity    Alcohol use: Yes     Alcohol/week: 1.2 oz     Types: 2 Shots of liquor per week     Comment: Once a week    Drug use: No    Sexual activity: Yes     Partners: Male   Other Topics Concern    Are you pregnant or think you may  "be? Not Asked    Breast-feeding Not Asked   Social History Narrative    . Adolescent children.        MEDICATIONS:  Current Outpatient Medications   Medication Sig Dispense Refill    benzoyl peroxide (BENZEPRO) 6 % Towl Use daily in shower to trunk/neck 1 each 5    EPIDUO FORTE 0.3-2.5 % GlwP AAA face qhs 45 g 3    phentermine (ADIPEX-P) 37.5 mg tablet Take 1 tablet (37.5 mg total) by mouth before breakfast. 30 tablet 2     No current facility-administered medications for this visit.        ALLERGIES:  Review of patient's allergies indicates:  No Known Allergies    ROS:  Review of Systems   Constitutional: Negative for chills, fever and malaise/fatigue.   Respiratory: Negative for cough, shortness of breath and wheezing.    Cardiovascular: Negative for chest pain and palpitations.   Gastrointestinal: Negative for abdominal pain, blood in stool, constipation, diarrhea, heartburn, melena, nausea and vomiting.   Genitourinary: Negative for flank pain, frequency and urgency.   Musculoskeletal: Negative for back pain, joint pain, myalgias and neck pain.   Neurological: Negative for dizziness, tingling and headaches.   Psychiatric/Behavioral: Negative for depression. The patient is not nervous/anxious.        PE:  Vitals:    09/26/18 1506   BP: 138/84   Pulse: 83   Weight: 104.2 kg (229 lb 11.5 oz)   Height: 5' 5" (1.651 m)       Physical Exam     DIAGNOSIS:  1. Morbid Obesity with Body mass index is 38.23 kg/m². and difficulty with weight loss.  2. Co-morbidities: suspected sleep apnea    PLAN:  The patient is a potential candidate for Bariatric Surgery. We have discussed her insurance criteria. Will refer to sleep med for eval of suspected sleep apnea. If she has MESHA will proceed with additional work up below. She is in agreement with this plan. she is interested in sleeve gastrectomy with Dr. Wood. The surgery and post-op care were discussed in detail with the patient. All questions were " answered.    she understands that bariatric surgery is a tool to aid in weight loss and that she needs to be committed to the diet and exercise post-operatively for successful weight loss.  Discussed expected weight loss outcomes after surgery which is 50% of the excess weight on her frame. Discussed with patient that bariatric surgery is not the easy way out and that it will take plenty of dedication on the patient's part to be successful. Also discussed the possibility of weight regain if the patient strays from the diet guidelines or exercise requirements. Patient verbalized understanding and wishes to proceed with the work-up.    ORDERS:  1. Chest X-Ray and EKG  2. Psychological Consult, Bariatric Dietician Consult, PCP Clearance and Sleep Med.  3. Bariatric Labs: BMP, CBC, Folate Serum, H. pylori, HgA1C, Hepatic Panel/LFT, Iron & TIBC, Lipid Profile, Magnesium, Phosphate, T3, T4, TSH, Free T4, Vitamin B12, and Vitamin B1.  4. Discussed the possibility of hyper-fertility surrounding 2 week liquid diet before and after surgery: advised patient to use 2 forms of birth control during this time period to avoid conception.  Patient verbalized understanding and will discuss options with her GYN.  5. If she is not a candidate for surgery following sleep med eval we will refer her to bariatric medicine.   She is not currently interested in [facility].    Primary Physician: Rima Madrigal MD  RTC: As scheduled.    60 minute visit, over 50% of time spent counseling patient face to face on surgical options, risks, benefits, expected diet, recommended exercise regimen, and expected weight loss.

## 2018-09-26 NOTE — LETTER
September 26, 2018      Rima Madrigal MD  [address]  [facility] [address]     [facility] - Bariatric Surgery  [address]  [facility] [address]  Phone: [phone]  Fax: [phone]   Patient: Kaylin Vargas   MR Number: [id number]   YOB: 1985   Date of Visit: 9/26/2018     Dear Dr. Madrigal:    Thank you for referring Kaylin Vargas to me for evaluation. Below are the relevant portions of my assessment and plan of care.    BARIATRIC NEW PATIENT EVALUATION     CHIEF COMPLAINT:   Morbid Obesity Body mass index is 38.23 kg/m². and difficulty with weight loss.      HISTORY OF PRESENT ILLNESS:  Kaylin Vargas  is a 33-year-old female presenting for morbid obesity Body mass index is 38.23 kg/m². and difficulty with weight loss. The patient has tried Adipex, 7 day cleanser, Apple Cider Vinegar, fasting. She was able to lose 2 pounds with fasting. Wt difficulty began with having children. Her highest wt is 230 pounds.    DIAGNOSIS:  1. Morbid Obesity with Body mass index is 38.23 kg/m². and difficulty with weight loss.  2. Co-morbidities: suspected sleep apnea     PLAN:The patient is a potential candidate for Bariatric Surgery. We have discussed her insurance criteria. Will refer to sleep med for eval of suspected sleep apnea. If she has MESHA will proceed with additional work up below. She is in agreement with this plan. She is interested in sleeve gastrectomy with Dr. Wood. The surgery and post-op care were discussed in detail with the patient. All questions were answered.     She understands that bariatric surgery is a tool to aid in weight loss and that she needs to be committed to the diet and exercise post-operatively for successful weight loss.  Discussed expected weight loss outcomes after surgery which is 50% of the excess weight on her frame. Discussed with patient that bariatric surgery is not the easy way out and that it will take plenty of  dedication on the patient's part to be successful. Also discussed the possibility of weight regain if the patient strays from the diet guidelines or exercise requirements. Patient verbalized understanding and wishes to proceed with the work-up.     ORDERS:  1. Chest X-Ray and EKG  2. Psychological Consult, Bariatric Dietician Consult, PCP Clearance and Sleep Med.  3. Bariatric Labs: BMP, CBC, Folate Serum, H. pylori, HgA1C, Hepatic Panel/LFT, Iron & TIBC, Lipid Profile, Magnesium, Phosphate, T3, T4, TSH, Free T4, Vitamin B12, and Vitamin B1.  4. Discussed the possibility of hyper-fertility surrounding 2 week liquid diet before and after surgery: advised patient to use 2 forms of birth control during this time period to avoid conception.  Patient verbalized understanding and will discuss options with her GYN.  5. If she is not a candidate for surgery following sleep med eval we will refer her to bariatric medicine.   She is not currently interested in [facility].    If you have questions, please do not hesitate to call me. I look forward to following Kaylin Larson along with you.    Sincerely,      MARISSA Gooden   Section of Bariatric Surgery  [facility]    ERLIN/adal

## 2018-10-03 DIAGNOSIS — L70.0 ACNE VULGARIS: Primary | ICD-10-CM

## 2018-10-04 RX ORDER — ADAPALENE AND BENZOYL PEROXIDE 3; 25 MG/G; MG/G
GEL TOPICAL
Qty: 45 G | Refills: 3 | Status: SHIPPED | OUTPATIENT
Start: 2018-10-04 | End: 2019-10-10

## 2018-10-06 ENCOUNTER — TELEPHONE (OUTPATIENT)
Dept: BARIATRICS | Facility: CLINIC | Age: 33
End: 2018-10-06

## 2018-10-06 NOTE — TELEPHONE ENCOUNTER
----- Message from MARISSA Gooden sent at 9/26/2018  4:22 PM CDT -----  I ordered a sleep medicine consult for her. Do you help with scheduling?  -NF

## 2019-05-10 ENCOUNTER — DOCUMENTATION ONLY (OUTPATIENT)
Dept: BARIATRICS | Facility: CLINIC | Age: 34
End: 2019-05-10

## 2019-05-26 ENCOUNTER — HOSPITAL ENCOUNTER (EMERGENCY)
Facility: HOSPITAL | Age: 34
Discharge: HOME | End: 2019-05-26
Attending: INTERNAL MEDICINE
Payer: COMMERCIAL

## 2019-05-26 VITALS
WEIGHT: 225 LBS | SYSTOLIC BLOOD PRESSURE: 136 MMHG | TEMPERATURE: 99 F | HEART RATE: 77 BPM | DIASTOLIC BLOOD PRESSURE: 79 MMHG | BODY MASS INDEX: 36.16 KG/M2 | RESPIRATION RATE: 18 BRPM | HEIGHT: 66 IN | OXYGEN SATURATION: 99 %

## 2019-05-26 DIAGNOSIS — B34.9 ACUTE VIRAL SYNDROME: Primary | ICD-10-CM

## 2019-05-26 LAB
CTP QC/QA: YES
FLUAV AG NPH QL: NEGATIVE
FLUBV AG NPH QL: NEGATIVE

## 2019-05-26 PROCEDURE — 99283 EMERGENCY DEPT VISIT LOW MDM: CPT | Mod: ER

## 2019-05-26 PROCEDURE — 87804 INFLUENZA ASSAY W/OPTIC: CPT | Mod: ER

## 2019-05-26 RX ORDER — IBUPROFEN 800 MG/1
800 TABLET ORAL EVERY 8 HOURS PRN
Qty: 30 TABLET | Refills: 0 | Status: SHIPPED | OUTPATIENT
Start: 2019-05-26 | End: 2019-10-10

## 2019-05-26 RX ORDER — FLUTICASONE PROPIONATE 50 MCG
2 SPRAY, SUSPENSION (ML) NASAL DAILY
Qty: 16 G | Refills: 0 | Status: SHIPPED | OUTPATIENT
Start: 2019-05-26 | End: 2019-10-10

## 2019-05-26 RX ORDER — AZELASTINE 1 MG/ML
2 SPRAY, METERED NASAL 2 TIMES DAILY
Qty: 30 ML | Refills: 0 | Status: SHIPPED | OUTPATIENT
Start: 2019-05-26 | End: 2019-10-10

## 2019-05-27 NOTE — ED PROVIDER NOTES
Encounter Date: 5/26/2019    SCRIBE #1 NOTE: I, Horace Bañuelos, am scribing for, and in the presence of,  Dr. Albright. I have scribed the following portions of the note - Other sections scribed: HPI, ROS, PE.       History   No chief complaint on file.    This is a 34 year old female complaining of fever x 2days. Highest temp was >100 F yesterday. Patient experiencing additional symptoms of cough, chills, headache, post nasal drip, bodyaches, and congestion. Reports taking Ibuprofen 800 mg and Mucinex with minimal relief. 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    The history is provided by the patient. No  was used.     Review of patient's allergies indicates:  No Known Allergies  Past Medical History:   Diagnosis Date    Acne     Obesity      No past surgical history on file.  Family History   Problem Relation Age of Onset    Diabetes Father     Hypertension Father     Hypertension Mother     Kidney disease Mother     Heart disease Mother     Crohn's disease Sister     Aneurysm Maternal Grandmother     Heart disease Maternal Grandfather     Heart disease Paternal Grandmother     Breast cancer Neg Hx     Colon cancer Neg Hx     Ovarian cancer Neg Hx     Melanoma Neg Hx      Social History     Tobacco Use    Smoking status: Never Smoker    Smokeless tobacco: Never Used   Substance Use Topics    Alcohol use: Yes     Alcohol/week: 1.2 oz     Types: 2 Shots of liquor per week     Comment: Once a week    Drug use: No     Review of Systems   Constitutional: Positive for chills and fever (resolved upon arrival to ED).   HENT: Positive for congestion and postnasal drip. Negative for sore throat.    Respiratory: Positive for cough. Negative for shortness of breath.    Cardiovascular: Negative for chest pain.   Gastrointestinal: Negative for nausea and vomiting.   Musculoskeletal: Positive for myalgias. Negative for back pain.   Skin: Negative for rash  and wound.   Neurological: Positive for headaches. Negative for weakness.   Hematological: Negative for adenopathy.   Psychiatric/Behavioral: Negative for behavioral problems.   All other systems reviewed and are negative.      Physical Exam     Initial Vitals   BP Pulse Resp Temp SpO2   -- -- -- -- --      MAP       --         Physical Exam    Nursing note and vitals reviewed.  Constitutional: She appears well-developed and well-nourished.   HENT:   Head: Normocephalic and atraumatic.   Right Ear: External ear normal.   Left Ear: External ear normal.   Nose: Mucosal edema present.   Mouth/Throat: Mucous membranes are normal. Posterior oropharyngeal erythema present. No oropharyngeal exudate or posterior oropharyngeal edema.   Post nasal drip noted   Clear nasal discharge    Eyes: Conjunctivae and EOM are normal. Pupils are equal, round, and reactive to light.   Neck: Normal range of motion. Neck supple.   Cardiovascular: Normal rate, regular rhythm, normal heart sounds and intact distal pulses. Exam reveals no gallop and no friction rub.    No murmur heard.  Pulmonary/Chest: Breath sounds normal. No stridor. No respiratory distress. She has no wheezes. She has no rhonchi. She has no rales. She exhibits no tenderness.   Abdominal: Soft. Bowel sounds are normal. She exhibits no distension and no mass. There is no tenderness. There is no rebound and no guarding.   Musculoskeletal: Normal range of motion.   Neurological: She is alert and oriented to person, place, and time. GCS score is 15.   Skin: Skin is warm and dry.   Psychiatric: She has a normal mood and affect. Her behavior is normal.         ED Course   Procedures  Labs Reviewed   POCT INFLUENZA A/B          Imaging Results    None          Medical Decision Making:   Initial Assessment:   This is a 34 year old female complaining of fever x 2days. Highest temp was >100 F yesterday. Patient experiencing additional symptoms of cough, chills, headache, body ache,  and congestion. Reports taking Ibuprofen 800 mg and Mucinex with minimal relief. 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  Clinical Tests:   Lab Tests: Ordered and Reviewed  ED Management:  Patient was given instructions for acute viral syndrome and prescriptions for Astelin/fluticasone /ibuprofen.  She was advised to follow up with her primary care physician within the next 3 days for re-evaluation and return to the emergency department if condition worsens.            Scribe Attestation:   Scribe #1: I performed the above scribed service and the documentation accurately describes the services I performed. I attest to the accuracy of the note.    This document was produced by a scribe under my direction and in my presence. I agree with the content of the note and have made any necessary edits.     Dr. Albright    05/26/2019 8:59 PM             Clinical Impression:     1. Acute viral syndrome            Disposition:   Disposition: Discharged  Condition: Stable                        Ashish Albright MD  05/26/19 2100

## 2019-10-10 ENCOUNTER — OFFICE VISIT (OUTPATIENT)
Dept: INTERNAL MEDICINE | Facility: CLINIC | Age: 34
End: 2019-10-10
Payer: COMMERCIAL

## 2019-10-10 VITALS
SYSTOLIC BLOOD PRESSURE: 122 MMHG | HEART RATE: 103 BPM | DIASTOLIC BLOOD PRESSURE: 85 MMHG | OXYGEN SATURATION: 97 % | WEIGHT: 232.81 LBS | BODY MASS INDEX: 37.41 KG/M2 | TEMPERATURE: 98 F | HEIGHT: 66 IN

## 2019-10-10 DIAGNOSIS — E66.9 OBESITY (BMI 35.0-39.9 WITHOUT COMORBIDITY): ICD-10-CM

## 2019-10-10 DIAGNOSIS — G56.01 CARPAL TUNNEL SYNDROME OF RIGHT WRIST: Primary | ICD-10-CM

## 2019-10-10 PROCEDURE — 3008F BODY MASS INDEX DOCD: CPT | Mod: CPTII,S$GLB,, | Performed by: NURSE PRACTITIONER

## 2019-10-10 PROCEDURE — 3008F PR BODY MASS INDEX (BMI) DOCUMENTED: ICD-10-PCS | Mod: CPTII,S$GLB,, | Performed by: NURSE PRACTITIONER

## 2019-10-10 PROCEDURE — 99999 PR PBB SHADOW E&M-EST. PATIENT-LVL IV: CPT | Mod: PBBFAC,,, | Performed by: NURSE PRACTITIONER

## 2019-10-10 PROCEDURE — 99999 PR PBB SHADOW E&M-EST. PATIENT-LVL IV: ICD-10-PCS | Mod: PBBFAC,,, | Performed by: NURSE PRACTITIONER

## 2019-10-10 PROCEDURE — 99213 OFFICE O/P EST LOW 20 MIN: CPT | Mod: S$GLB,,, | Performed by: NURSE PRACTITIONER

## 2019-10-10 PROCEDURE — 99213 PR OFFICE/OUTPT VISIT, EST, LEVL III, 20-29 MIN: ICD-10-PCS | Mod: S$GLB,,, | Performed by: NURSE PRACTITIONER

## 2019-10-10 RX ORDER — GABAPENTIN 300 MG/1
CAPSULE ORAL
Qty: 30 CAPSULE | Refills: 1 | Status: SHIPPED | OUTPATIENT
Start: 2019-10-10 | End: 2020-03-27

## 2019-10-10 NOTE — LETTER
October 10, 2019      [facility]  [address]  [facility] [address]  Phone: [phone]  Fax: [phone]       Patient: Kaylin Vargas   YOB: 1985  Date of Visit: 10/10/2019    To Whom It May Concern:    Kimberly Vargas  was at [facility] on 10/10/2019. She may return to work/school on 10- with restrictions, no heavy lifting for 2 weeks. If you have any questions or concerns, or if I can be of further assistance, please do not hesitate to contact me.    Sincerely,    Zonia Magdaleno DNP

## 2019-10-10 NOTE — PATIENT INSTRUCTIONS
Velcro wrist splint wear at night, may wear during the day as needed    Gabapentin 300mg at bedtime initially, may increase to 2-3 times a day as needed. May alternate with tylenol or motrin otc    No heavy lifting for 2 weeks, work note given to reflect restrictions    Notify in 2 weeks if no improvement for ortho hand referral      Carpal Tunnel Syndrome    Carpal tunnel syndrome is a painful condition of the wrist and arm. It is caused by pressure on the median nerve.  The median nerve is one of the nerves that give feeling and movement to the hand. It passes through a tunnel in the wrist called the carpal tunnel. This tunnel is made up of bones and ligaments. Narrowing of this tunnel or swelling of the tissues inside the tunnel puts pressure on the median nerve. This causes numbness, pins and needles, or electric shooting pains in your hand and forearm. Often the pain is worse at night and may wake you when you are asleep.  Carpal tunnel syndrome may occur during pregnancy and with use of birth control pills. It is more common in workers who must often bend their wrists. It is also common in people who work with power tools that cause strong vibrations.  Home care  · Rest the painful wrist. Avoid repeated bending of the wrist back and forth. This puts pressure on the median nerve. Avoid using power tools with strong vibrations.  · If you were given a splint, wear it at night while you sleep. You may also wear it during the day for comfort.  · Move your fingers and wrists often to avoid stiffness.  · Elevate your arms on pillows when you lie down.  · Try using the unaffected hand more.  · Try not to hold your wrists in a bent, downward position.  · Sometimes changes in the work place may ease symptoms. If you type most of the day, it may help to change the position of your keyboard or add a wrist support. Your wrist should be in a neutral position and not bent back when typing.  · You may use over-the-counter  pain medicine to treat pain and inflammation, unless another medicine was prescribed. Anti-inflammatory pain medicines, such as ibuprofen or naproxen may be more effective than acetaminophen, which treats pain, but not inflammation. If you have chronic liver or kidney disease or ever had a stomach ulcer or GI bleeding, talk with your doctor before using these medicines.  · Opioid pain medicine will only give temporary relief and does not treat the problem. If pain continues, you may need a shot of a steroid drug into your wrist.  · If the above methods fail, you may need surgery. This will open the carpal tunnel and release the pressure on the trapped nerve.  Follow-up care  Follow up with your healthcare provider, or as advised, if the pain doesnt begin to improve within the next week.  If X-rays were taken, you will be notified of any new findings that may affect your care.  When to seek medical advice  Call your healthcare provider right away if any of these occur:  · Pain not improving with the above treatment  · Fingers or hand become cold, blue, numb, or tingly  · Your whole arm becomes swollen or weak  Date Last Reviewed: 11/23/2015 © 2000-2017 [employer]. [address] [place] [address]. All rights reserved. This information is not intended as a substitute for professional medical care. Always follow your healthcare professional's instructions.      Carpal Tunnel Syndrome Prevention Tips  Some repetitive hand activities put you at higher risk for carpal tunnel syndrome (CTS). But you can reduce your risk. Learn how to change the way you use your hands. Below are tips for at home and on the job. Be sure to also follow the hand and wrist safety policies at your workplace.      Keep your wrist in a neutral (straight) position when exercising.      Keep your wrist in neutral  Keep a neutral (straight) wrist position as often as you can. Dont use your wrist in a bent (flexed) position  for long periods of time. This includes extended or twisted positions.  Watch your   Dont just use your thumb and index finger to grasp or lift. This can put stress on your wrist. When you can, use your whole hand and all its fingers to grasp an object.  Minimize repetition  Dont move your arms or hands or hold an object in the same way for long periods of time. Even simple, light tasks can cause injury this way. Instead, alternate tasks or switch hands.  Rest your hands  Give your hands a break from time to time with a rest. Even a few minutes once an hour can help.  Reduce speed and force  Slow down the speed in which you do a forceful, repetitive motion. This gives your wrist time to recover from the effort. Use power tools to help reduce the force.  Strengthen the muscles  Weak muscles may lead to a poor wrist or arm position. Exercises will make your hand and arm muscles stronger. This can help you keep a better position.  Date Last Reviewed: 9/11/2015  © 5792-0995 [employer]. [address] [place] [address], [place] [address]. All rights reserved. This information is not intended as a substitute for professional medical care. Always follow your healthcare professional's instructions.

## 2019-10-10 NOTE — PROGRESS NOTES
Subjective:       Patient ID: Kaylin Vargas is a 34 y.o. female.    Chief Complaint: Hand Pain (pt hand appears to be swollen a little, she complains of chronic pain, and burning sensation, she denies any falls but admit to picking up on heavy objects at work 3+ days ago) and Arm Pain (right arm pain shooting up from hand, pt states she even feels some numbness occasionally)    Pt of Dr. Madrigal, here for right wrist/hand pain. Started 3-4 days ago after helping lift on a heavy bins at work. Worse at night time, starts in palm of hand and shoots to wrist and up arm. Reports numb fingers, burning and tingling. Denies injury, trauma, or fall. Tried otc ibuprofen without relief.    Review of Systems   Constitutional: Negative for chills and fever.   Respiratory: Negative for chest tightness and shortness of breath.    Cardiovascular: Negative for chest pain.   Musculoskeletal: Positive for arthralgias and joint swelling. Negative for myalgias.        As documented in HPI     Skin: Negative for color change, pallor, rash and wound.   Allergic/Immunologic: Negative for environmental allergies, food allergies and immunocompromised state.   Neurological: Positive for numbness. Negative for dizziness, tremors and weakness.        As documented in HPI     Hematological: Negative for adenopathy. Does not bruise/bleed easily.   Psychiatric/Behavioral: Negative for suicidal ideas.         Review of patient's allergies indicates:  No Known Allergies        Patient Active Problem List   Diagnosis    Obesity (BMI 35.0-39.9 without comorbidity)    BMI 38.0-38.9,adult    Suspected sleep apnea    Acute viral syndrome     Past Medical History:   Diagnosis Date    Acne     Obesity      No past surgical history on file.    Social History     Socioeconomic History    Marital status: Single     Spouse name: Not on file    Number of children: 2    Years of education: Not on file    Highest education level: Not on file  "  Occupational History    Not on file   Social Needs    Financial resource strain: Not on file    Food insecurity:     Worry: Not on file     Inability: Not on file    Transportation needs:     Medical: Not on file     Non-medical: Not on file   Tobacco Use    Smoking status: Never Smoker    Smokeless tobacco: Never Used   Substance and Sexual Activity    Alcohol use: Yes     Alcohol/week: 2.0 standard drinks     Types: 2 Shots of liquor per week     Comment: Once a week    Drug use: No    Sexual activity: Yes     Partners: Male   Lifestyle    Physical activity:     Days per week: Not on file     Minutes per session: Not on file    Stress: Not on file   Relationships    Social connections:     Talks on phone: Not on file     Gets together: Not on file     Attends Orthodox service: Not on file     Active member of club or organization: Not on file     Attends meetings of clubs or organizations: Not on file     Relationship status: Not on file   Other Topics Concern    Are you pregnant or think you may be? Not Asked    Breast-feeding Not Asked   Social History Narrative    . Adolescent children.      Family History   Problem Relation Age of Onset    Diabetes Father     Hypertension Father     Hypertension Mother     Kidney disease Mother     Heart disease Mother     Crohn's disease Sister     Aneurysm Maternal Grandmother     Heart disease Maternal Grandfather     Heart disease Paternal Grandmother     Breast cancer Neg Hx     Colon cancer Neg Hx     Ovarian cancer Neg Hx     Melanoma Neg Hx        Objective:     Vitals:    10/10/19 1604   BP: 122/85   Pulse: 103   Temp: 98.3 °F (36.8 °C)   TempSrc: Oral   SpO2: 97%   Weight: 105.6 kg (232 lb 12.9 oz)   Height: 5' 6" (1.676 m)   PainSc: 10-Worst pain ever   PainLoc: Hand       Body mass index is 37.58 kg/m².    Physical Exam   Constitutional: She is oriented to person, place, and time. She appears well-developed and well-nourished. "   obese   HENT:   Head: Normocephalic.   Eyes: Pupils are equal, round, and reactive to light.   Cardiovascular: Normal rate.   Pulmonary/Chest: Effort normal.   Musculoskeletal: She exhibits tenderness.        Right wrist: She exhibits tenderness and swelling. She exhibits normal range of motion, no bony tenderness, no effusion, no crepitus, no deformity and no laceration.   Positive Tinel's and Phalen's sign right wrist   Neurological: She is alert and oriented to person, place, and time.   Skin: Skin is warm. Capillary refill takes less than 2 seconds.   Psychiatric: She has a normal mood and affect. Her behavior is normal. Judgment and thought content normal.   Nursing note and vitals reviewed.      Assessment:       1. Carpal tunnel syndrome of right wrist    2. BMI 37.0-37.9, adult    3. Obesity (BMI 35.0-39.9 without comorbidity)        Plan:       Kaylin Larson was seen today for hand pain and arm pain.    Diagnoses and all orders for this visit:    Carpal tunnel syndrome of right wrist  -     gabapentin (NEURONTIN) 300 MG capsule; Take initially at bedtime, may increase to 2-3 times a day gradually as needed for pain/numbness/tingling  -     arm brace Misc; Wear at bedtime and during the day as needed for wrist pain--Right wrist    BMI 37.0-37.9, adult  BMI reviewed    Obesity (BMI 35.0-39.9 without comorbidity)  BMI reviewed.    Diet and exercise to lose weight.    Velcro wrist splint wear at night, may wear during the day as needed    Gabapentin 300mg at bedtime initially, may increase to 2-3 times a day as needed. May alternate with tylenol or motrin otc    No heavy lifting for 2 weeks, work note given to reflect restrictions    Notify in 2 weeks if no improvement for ortho hand referral    Self care instructions provided in AVS    Follow up if symptoms worsen or fail to improve.

## 2019-10-10 NOTE — LETTER
October 10, 2019      [facility]  [address]  [facility] [address]  Phone: [phone]  Fax: [phone]       Patient: Kaylin Vargas   YOB: 1985  Date of Visit: 10/10/2019    To Whom It May Concern:    Kimberly Vargas  was at [facility] on 10/10/2019. She may return to work/school on 10-11-19 with no restrictions. If you have any questions or concerns, or if I can be of further assistance, please do not hesitate to contact me.    Sincerely,    Zonia Magdaleno DNP

## 2019-12-04 ENCOUNTER — PATIENT OUTREACH (OUTPATIENT)
Dept: ADMINISTRATIVE | Facility: OTHER | Age: 34
End: 2019-12-04

## 2020-01-02 ENCOUNTER — PATIENT OUTREACH (OUTPATIENT)
Dept: ADMINISTRATIVE | Facility: OTHER | Age: 35
End: 2020-01-02

## 2020-01-10 ENCOUNTER — PATIENT OUTREACH (OUTPATIENT)
Dept: ADMINISTRATIVE | Facility: OTHER | Age: 35
End: 2020-01-10

## 2020-02-28 ENCOUNTER — OFFICE VISIT (OUTPATIENT)
Dept: INTERNAL MEDICINE | Facility: CLINIC | Age: 35
End: 2020-02-28
Payer: COMMERCIAL

## 2020-02-28 VITALS
HEART RATE: 85 BPM | DIASTOLIC BLOOD PRESSURE: 84 MMHG | BODY MASS INDEX: 37.28 KG/M2 | OXYGEN SATURATION: 99 % | SYSTOLIC BLOOD PRESSURE: 122 MMHG | HEIGHT: 66 IN | WEIGHT: 231.94 LBS | TEMPERATURE: 99 F

## 2020-02-28 DIAGNOSIS — R11.0 NAUSEA: Primary | ICD-10-CM

## 2020-02-28 DIAGNOSIS — H61.22 IMPACTED CERUMEN OF LEFT EAR: ICD-10-CM

## 2020-02-28 DIAGNOSIS — R68.83 CHILLS: ICD-10-CM

## 2020-02-28 DIAGNOSIS — R52 BODY ACHES: ICD-10-CM

## 2020-02-28 DIAGNOSIS — R09.81 NASAL CONGESTION: ICD-10-CM

## 2020-02-28 LAB
INFLUENZA A, MOLECULAR: NEGATIVE
INFLUENZA B, MOLECULAR: NEGATIVE
SPECIMEN SOURCE: NORMAL

## 2020-02-28 PROCEDURE — 99213 PR OFFICE/OUTPT VISIT, EST, LEVL III, 20-29 MIN: ICD-10-PCS | Mod: S$GLB,,, | Performed by: FAMILY MEDICINE

## 2020-02-28 PROCEDURE — 99999 PR PBB SHADOW E&M-EST. PATIENT-LVL III: CPT | Mod: PBBFAC,,, | Performed by: FAMILY MEDICINE

## 2020-02-28 PROCEDURE — 87502 INFLUENZA DNA AMP PROBE: CPT

## 2020-02-28 PROCEDURE — 3008F BODY MASS INDEX DOCD: CPT | Mod: CPTII,S$GLB,, | Performed by: FAMILY MEDICINE

## 2020-02-28 PROCEDURE — 3008F PR BODY MASS INDEX (BMI) DOCUMENTED: ICD-10-PCS | Mod: CPTII,S$GLB,, | Performed by: FAMILY MEDICINE

## 2020-02-28 PROCEDURE — 99999 PR PBB SHADOW E&M-EST. PATIENT-LVL III: ICD-10-PCS | Mod: PBBFAC,,, | Performed by: FAMILY MEDICINE

## 2020-02-28 PROCEDURE — 99213 OFFICE O/P EST LOW 20 MIN: CPT | Mod: S$GLB,,, | Performed by: FAMILY MEDICINE

## 2020-02-28 RX ORDER — ONDANSETRON 4 MG/1
4 TABLET, ORALLY DISINTEGRATING ORAL EVERY 8 HOURS PRN
Qty: 30 TABLET | Refills: 0 | Status: SHIPPED | OUTPATIENT
Start: 2020-02-28 | End: 2020-05-05

## 2020-02-28 NOTE — PROGRESS NOTES
Subjective:      Patient ID: Kaylin Vargas is a 34 y.o. female.    Chief Complaint: Influenza (x2 days) and Nausea (x2 days)      HPI:  Kaylin Vargas is a 34 year old female who presents to clinic today with a chief complaint of flu symptoms.    Patient is new to me.  PCP is Dr. Rima Madrigal    Developed nausea and light headedness.  Felt like she may vomit but has not vomited yet.  Subsequently has felt feverish/chilled with associated sore throat, body aches, nasal/sinus congestion, sinus pressure/pain, headache, sore throat, bilateral ear pain, clear rhinorrhea.  Took Tylenol.  Denies any known recent sick contacts.  Did get flu shot earlier this season.      Denies associated associated SOB, chest pain, hearing changes, otorrhea, epistaxis.      Past Medical History:   Diagnosis Date    Acne     Obesity        History reviewed. No pertinent surgical history.    Family History   Problem Relation Age of Onset    Diabetes Father     Hypertension Father     Hypertension Mother     Kidney disease Mother     Heart disease Mother     Crohn's disease Sister     Aneurysm Maternal Grandmother     Heart disease Maternal Grandfather     Heart disease Paternal Grandmother     Breast cancer Neg Hx     Colon cancer Neg Hx     Ovarian cancer Neg Hx     Melanoma Neg Hx        Social History     Socioeconomic History    Marital status: Single     Spouse name: Not on file    Number of children: 2    Years of education: Not on file    Highest education level: Not on file   Occupational History    Not on file   Social Needs    Financial resource strain: Not on file    Food insecurity:     Worry: Not on file     Inability: Not on file    Transportation needs:     Medical: Not on file     Non-medical: Not on file   Tobacco Use    Smoking status: Never Smoker    Smokeless tobacco: Never Used   Substance and Sexual Activity    Alcohol use: Yes     Alcohol/week: 2.0 standard drinks     Types: 2  Shots of liquor per week     Comment: Once a week    Drug use: No    Sexual activity: Yes     Partners: Male   Lifestyle    Physical activity:     Days per week: Not on file     Minutes per session: Not on file    Stress: Not on file   Relationships    Social connections:     Talks on phone: Not on file     Gets together: Not on file     Attends Restorationism service: Not on file     Active member of club or organization: Not on file     Attends meetings of clubs or organizations: Not on file     Relationship status: Not on file   Other Topics Concern    Are you pregnant or think you may be? Not Asked    Breast-feeding Not Asked   Social History Narrative    . Adolescent children.        Review of Systems   Constitutional: Negative for chills, fatigue and fever.   HENT: Positive for congestion, ear pain, rhinorrhea, sinus pressure, sinus pain and sore throat. Negative for ear discharge, hearing loss, nosebleeds and trouble swallowing.    Eyes: Negative for pain and visual disturbance.   Respiratory: Negative for cough, shortness of breath and wheezing.    Cardiovascular: Negative for chest pain and palpitations.   Gastrointestinal: Positive for nausea. Negative for abdominal distention, abdominal pain, constipation, diarrhea and vomiting.   Genitourinary: Negative for decreased urine volume, difficulty urinating, dysuria, hematuria and urgency.   Musculoskeletal: Positive for arthralgias and myalgias. Negative for back pain.   Skin: Negative for color change and rash.   Neurological: Positive for light-headedness and headaches. Negative for dizziness, tremors, weakness and numbness.   Psychiatric/Behavioral: Negative for agitation, behavioral problems and confusion. The patient is not nervous/anxious.      Objective:     Vitals:    02/28/20 1638   BP: 122/84   BP Location: Right arm   Patient Position: Sitting   BP Method: Large (Manual)   Pulse: 85   Temp: 99.2 °F (37.3 °C)   SpO2: 99%   Weight: 105.2 kg  "(231 lb 14.8 oz)   Height: 5' 6" (1.676 m)       Physical Exam   Constitutional: She appears well-developed and well-nourished.   HENT:   Head: Normocephalic and atraumatic.   Right Ear: Hearing, tympanic membrane, external ear and ear canal normal.   Left Ear: Hearing and external ear normal.   Nose: Nose normal.   Mouth/Throat: Oropharynx is clear and moist. No oropharyngeal exudate, posterior oropharyngeal edema, posterior oropharyngeal erythema or tonsillar abscesses.   Cerumen impaction to left EAC.   Eyes: Pupils are equal, round, and reactive to light. Conjunctivae are normal.   Neck: Neck supple. No tracheal deviation present.   Cardiovascular: Normal rate, regular rhythm and normal heart sounds. Exam reveals no gallop and no friction rub.   No murmur heard.  Pulmonary/Chest: Effort normal and breath sounds normal. No respiratory distress. She has no wheezes. She has no rales.   Abdominal: Soft. Bowel sounds are normal. She exhibits no distension. There is no tenderness. There is no rebound and no guarding.   Musculoskeletal: She exhibits no deformity.   Lymphadenopathy:     She has cervical adenopathy.        Right cervical: No superficial cervical adenopathy present.       Left cervical: Superficial cervical adenopathy present.   Neurological: She is alert. Coordination normal.   Skin: Skin is warm and dry.   Psychiatric: She has a normal mood and affect. Her behavior is normal.   Nursing note and vitals reviewed.     Assessment:      1. Nausea    2. Body aches    3. Chills    4. Nasal congestion    5. Impacted cerumen of left ear      Plan:   Kaylin Larson was seen today for influenza and nausea.    Diagnoses and all orders for this visit:    Suspect viral URI.  Recommended adequate rest, hydration, OTC Tylenol PRN headache/bodyaches/fevers/chills, OTC Flonase PRN nasal/sinus congestion, OTC Mucinex.  Recommended patient return to clinic in 7-10 days if no improvement or sooner if worsening.    Nausea  - "     ondansetron (ZOFRAN-ODT) 4 MG TbDL; Take 1 tablet (4 mg total) by mouth every 8 (eight) hours as needed (nausea & vomiting).    Body aches  -     Influenza A & B by Molecular    Chills  -     Influenza A & B by Molecular    Nasal congestion  -     Influenza A & B by Molecular    Impacted cerumen of left ear        -     Recommended OTC Debrox drops PRN.  Avoid Q-tip use.

## 2020-03-20 ENCOUNTER — NURSE TRIAGE (OUTPATIENT)
Dept: ADMINISTRATIVE | Facility: CLINIC | Age: 35
End: 2020-03-20

## 2020-03-20 NOTE — TELEPHONE ENCOUNTER
[employer] employee working at [employer]   Made virtual visit today. Was indicated to get COVID test. Had the script with her at [address] [facility] but was sent back home.  Fever, chills, nausea and body aches for past 3 days.   Fever highest 102F

## 2020-03-21 NOTE — TELEPHONE ENCOUNTER
Reason for Disposition   [1] Common cold symptoms AND [2] onset > 14 days after COVID-19 EXPOSURE   [1] Fever or feeling feverish AND [2] within 14 Days of COVID-19 EXPOSURE (Close Contact)   [1] Cough occurs AND [2] within 14 days of COVID-19 EXPOSURE   [1] Fever (or feeling feverish) OR cough occurs AND [2] travel from or living in high risk area (identified by CDC) AND [3] within last 14 days   [1] COVID-19 EXPOSURE within last 14 days AND [2] mild body aches, chills, diarrhea, headache, runny nose, or sore throat occur    Additional Information   Negative: Severe difficulty breathing (e.g., struggling for each breath, speak in single words, bluish lips)   Negative: Sounds like a life-threatening emergency to the triager   Negative: [1] Dry cough occurs AND [2] onset > 14 days after COVID-19 EXPOSURE   Negative: [1] Wet cough (i.e., white-yellow, yellow, green, or kwesi colored sputum) AND [2] onset > 14 days after COVID-19 EXPOSURE   Negative: [1] Difficulty breathing (shortness of breath) occurs AND [2] onset > 14 days after COVID-19 EXPOSURE (Close Contact)   Negative: [1] Difficulty breathing occurs AND [2] within 14 days of COVID-19 EXPOSURE (Close Contact)   Negative: Patient sounds very sick or weak to the triager   Negative: [1] COVID-19 EXPOSURE within last 14 days AND [2] NO cough, fever, or breathing difficulty AND [3] exposed person is a healthcare worker who was NOT using all recommended personal protective equipment (i.e., a respirator-N95 mask, eye protection, gloves, and gown)   Negative: [1] COVID-19 EXPOSURE (Close Contact) within last 14 days AND [2] NO cough, fever, or breathing difficulty   Negative: [1] Travel from or living in high risk area (identified by CDC) AND [2] within last 14 days AND [3] NO cough or fever or breathing difficulty   Negative: [1] COVID-19 EXPOSURE (Close Contact) AND [2] 15 or more days ago AND [3] NO cough or fever or breathing difficulty    Negative: [1] No COVID-19 EXPOSURE BUT [2] living with someone who was exposed and who has no fever or cough   Negative: [1] Caller concerned that exposure to COVID-19 occurred BUT [2] does not meet COVID-19 EXPOSURE criteria from CDC   Negative: COVID-19 testing, questions about who needs it   Negative: [1] No COVID-19 EXPOSURE BUT [2] questions about   Negative: [1] Diagnosed with Coronavirus Disease (COVID-19) AND [2] questions about home isolation    Protocols used: CORONAVIRUS (COVID-19) EXPOSURE-A-AH

## 2020-03-23 ENCOUNTER — TELEPHONE (OUTPATIENT)
Dept: PRIMARY CARE CLINIC | Facility: CLINIC | Age: 35
End: 2020-03-23

## 2020-03-23 NOTE — TELEPHONE ENCOUNTER
Pt state she was tested at a COVID site she is still having fever the number to employee health is given

## 2020-03-23 NOTE — TELEPHONE ENCOUNTER
Patient contacted triage nurse on call Friday evening with COVID symptoms. Was unable to get tested at a drive-in sited. Please advise, as an [employer] Employee, she has to contact [employer] to be approved for testing. And she is not to return to work for 14 days from symptom onset, if free of fever x 3 days.

## 2020-03-24 ENCOUNTER — PATIENT MESSAGE (OUTPATIENT)
Dept: INTERNAL MEDICINE | Facility: CLINIC | Age: 35
End: 2020-03-24

## 2020-03-24 ENCOUNTER — TELEPHONE (OUTPATIENT)
Dept: INTERNAL MEDICINE | Facility: CLINIC | Age: 35
End: 2020-03-24

## 2020-03-24 NOTE — TELEPHONE ENCOUNTER
Spoke with pt regarding BP check appt tomorrow. She had an elevated reading today of 150/90 via wrist monitor and is concerned as she has never had HTN before. She is also concerned due to employee health sending her leaders a note saying she can return to work tomorrow. I noted that she contacted her PCP on yesterday as on last Friday she was triaged via phone after a tele health visit with COVID symptoms. Tried to get tested at a drive in site and was unable to at that time. Dr. Madrigal, her PCP, advised she should be tested in employee health and to not to return to work 14 days from her symptom onset which was 3/17 and if fever free for 3 days. She ended up getting tested off site yesterday and as of yesterday was still having fever per the phone notes. I advised pt what her PCP stated and asked that she not come in tomorrow, to monitor her BP twice a day and we can reschedule her virtually to discuss her BP on Friday. Also advised if she needs a note regarding being off from work to contact Dr. Madrigal for the note indicating what she advised via phone as far as returning to work 14 days from symptoms. Verbalized understanding.    Katy please schedule pt for virtual visit this Friday at 1000am for BP discussion.

## 2020-03-27 PROBLEM — Z20.828 CONTACT W AND EXPOSURE TO OTH VIRAL COMMUNICABLE DISEASES: Status: ACTIVE | Noted: 2020-03-20

## 2020-04-16 ENCOUNTER — OFFICE VISIT (OUTPATIENT)
Dept: OTOLARYNGOLOGY | Facility: CLINIC | Age: 35
End: 2020-04-16
Payer: COMMERCIAL

## 2020-04-16 DIAGNOSIS — J01.90 ACUTE SINUSITIS, RECURRENCE NOT SPECIFIED, UNSPECIFIED LOCATION: Primary | ICD-10-CM

## 2020-04-16 DIAGNOSIS — J31.0 CHRONIC RHINITIS: ICD-10-CM

## 2020-04-16 PROCEDURE — 99203 PR OFFICE/OUTPT VISIT, NEW, LEVL III, 30-44 MIN: ICD-10-PCS | Mod: 95,,, | Performed by: OTOLARYNGOLOGY

## 2020-04-16 PROCEDURE — 99203 OFFICE O/P NEW LOW 30 MIN: CPT | Mod: 95,,, | Performed by: OTOLARYNGOLOGY

## 2020-04-16 RX ORDER — AMOXICILLIN 500 MG/1
500 TABLET, FILM COATED ORAL EVERY 12 HOURS
Qty: 20 TABLET | Refills: 0 | Status: SHIPPED | OUTPATIENT
Start: 2020-04-16 | End: 2020-04-26

## 2020-04-16 NOTE — PROGRESS NOTES
Subjective:      Kaylin Vargas is a 34 y.o. female who was self-referred for nasal congestion.    She was in her usual state of health until 7 days ago when she had the acute onset of bilateral nasal congestion, moderate bilateral facial pressure and aching, postnasal drip and fatigue.  She had a cough for a few days that was non productive and has resolved.  She denies rhinorrhea, hyposmia, aural fullness or notable sinus infections or baseline obstruction.  She does describe variable perennial pruritic symptoms that are managed with antihistamines.    Current sinonasal medications include Claritin as needed.  The last course of antibiotics was a long time ago.  She does not regularly use nasal decongestant sprays.    She does not recall previously having allergy testing.    She denies a history of asthma.    She denies a history of reflux symptoms.  She has not previously had an EGD.    She denies a diagnosis of obstructive sleep apnea.     She has not had sinonasal surgery.  She has not had a tonsillectomy.    She does not recall a prior history of nasal trauma.    SNOT-22 score: : (P) 40  NOSE score:: (P) 65%  ETDQ-7 score:: (P) 3.7      Past Medical History  She has a past medical history of Acne and Obesity.    Past Surgical History  She has no past surgical history on file.    Family History  Her family history includes Aneurysm in her maternal grandmother; Crohn's disease in her sister; Diabetes in her father; Heart disease in her maternal grandfather, mother, and paternal grandmother; Hypertension in her father and mother; Kidney disease in her mother.    Social History  She reports that she has never smoked. She has never used smokeless tobacco. She reports that she drinks about 2.0 standard drinks of alcohol per week. She reports that she does not use drugs.    Allergies  She has No Known Allergies.    Medications   She has a current medication list which includes the following prescription(s):  albuterol, amoxicillin, benzonatate, ondansetron, and promethazine-dextromethorphan.    Review of Systems  Review of Systems   Constitutional: Negative for fatigue, fever and unexpected weight change.   HENT: Positive for congestion, ear pain, postnasal drip, sinus pressure and sore throat. Negative for dental problem, ear discharge, facial swelling, hearing loss, hoarse voice, nosebleeds, rhinorrhea, tinnitus, trouble swallowing and voice change.    Eyes: Positive for itching. Negative for photophobia, discharge and visual disturbance.   Respiratory: Positive for cough and shortness of breath. Negative for apnea and wheezing.    Cardiovascular: Negative for chest pain and palpitations.   Gastrointestinal: Negative for abdominal pain, nausea and vomiting.   Endocrine: Negative for cold intolerance and heat intolerance.   Genitourinary: Negative for difficulty urinating.   Musculoskeletal: Negative for arthralgias, back pain, myalgias and neck pain.   Skin: Negative for rash.   Allergic/Immunologic: Negative for environmental allergies and food allergies.   Neurological: Negative for dizziness, seizures, syncope, weakness and headaches.   Hematological: Negative for adenopathy. Does not bruise/bleed easily.   Psychiatric/Behavioral: Negative for decreased concentration, dysphoric mood and sleep disturbance. The patient is nervous/anxious.           Objective:     There were no vitals taken for this visit.       Constitutional:   She appears well-developed. She is cooperative. Normal speech.  No hoarse voice.      Head:  Normocephalic. Facial strength is normal.      Ears:    Right Ear: No drainage or tenderness. No decreased hearing is noted.   Left Ear: No drainage or tenderness. No decreased hearing is noted.     Nose:  No epistaxis. Right sinus exhibits no maxillary sinus tenderness and no frontal sinus tenderness. Left sinus exhibits no maxillary sinus tenderness and no frontal sinus tenderness.   Mild positive  [place] maneuver    Mouth/Throat  She does not have dentures. Normal dentition. Tonsils present, +2.    +2 MMP      Neck:  Phonation normal. Normal range of motion and no stridor present.     Pulmonary/Chest:   Effort normal. No stridor. No respiratory distress.     Psychiatric:   She has a normal mood and affect. Her speech is normal and behavior is normal.     Neurological:   No cranial nerve deficit.     Skin:   No rash noted.       Procedure    None        Data Reviewed    WBC (K/uL)   Date Value   07/26/2018 11.82     Eosinophil% (%)   Date Value   07/26/2018 0.9     Eos # (K/uL)   Date Value   07/26/2018 0.1     Platelets (K/uL)   Date Value   07/26/2018 302     Glucose (mg/dL)   Date Value   07/26/2018 85     No results found for: IGE    No sinus imaging available.       Assessment:     1. Acute sinusitis, recurrence not specified, unspecified location    2. Chronic rhinitis         Plan:     I had a long discussion with the patient regarding her condition and the further workup and management options.  I prescribed a therapeutic course of amoxicillin for 10 days.  I prescribed the daily use of nasal saline spray to prevent mucosal dessiccation.    Follow up if symptoms worsen or fail to improve.    This encounter was completed via telemedicine virtual visit using synchronous real-time video and audio communication.

## 2020-04-21 DIAGNOSIS — Z01.84 ANTIBODY RESPONSE EXAMINATION: ICD-10-CM

## 2020-04-22 ENCOUNTER — LAB VISIT (OUTPATIENT)
Dept: LAB | Facility: HOSPITAL | Age: 35
End: 2020-04-22
Attending: INTERNAL MEDICINE
Payer: COMMERCIAL

## 2020-04-22 DIAGNOSIS — Z01.84 ANTIBODY RESPONSE EXAMINATION: ICD-10-CM

## 2020-04-22 LAB — SARS-COV-2 IGG SERPL QL IA: POSITIVE

## 2020-04-22 PROCEDURE — 86769 SARS-COV-2 COVID-19 ANTIBODY: CPT

## 2020-04-22 PROCEDURE — 36415 COLL VENOUS BLD VENIPUNCTURE: CPT

## 2020-04-24 ENCOUNTER — HOSPITAL ENCOUNTER (EMERGENCY)
Facility: HOSPITAL | Age: 35
Discharge: HOME | End: 2020-04-24
Attending: EMERGENCY MEDICINE
Payer: COMMERCIAL

## 2020-04-24 VITALS
HEIGHT: 66 IN | RESPIRATION RATE: 18 BRPM | HEART RATE: 93 BPM | WEIGHT: 230 LBS | DIASTOLIC BLOOD PRESSURE: 90 MMHG | BODY MASS INDEX: 36.96 KG/M2 | TEMPERATURE: 99 F | SYSTOLIC BLOOD PRESSURE: 145 MMHG | OXYGEN SATURATION: 100 %

## 2020-04-24 DIAGNOSIS — R07.9 CHEST PAIN: ICD-10-CM

## 2020-04-24 DIAGNOSIS — R10.13 ABDOMINAL DISCOMFORT, EPIGASTRIC: Primary | ICD-10-CM

## 2020-04-24 LAB
ALBUMIN SERPL BCP-MCNC: 3.6 G/DL (ref 3.5–5.2)
ALP SERPL-CCNC: 60 U/L (ref 55–135)
ALT SERPL W/O P-5'-P-CCNC: 13 U/L (ref 10–44)
ANION GAP SERPL CALC-SCNC: 11 MMOL/L (ref 8–16)
AST SERPL-CCNC: 18 U/L (ref 10–40)
B-HCG UR QL: NEGATIVE
BASOPHILS # BLD AUTO: 0.05 K/UL (ref 0–0.2)
BASOPHILS NFR BLD: 0.4 % (ref 0–1.9)
BILIRUB SERPL-MCNC: 0.5 MG/DL (ref 0.1–1)
BUN SERPL-MCNC: 9 MG/DL (ref 6–20)
CALCIUM SERPL-MCNC: 9 MG/DL (ref 8.7–10.5)
CHLORIDE SERPL-SCNC: 106 MMOL/L (ref 95–110)
CO2 SERPL-SCNC: 20 MMOL/L (ref 23–29)
CREAT SERPL-MCNC: 0.9 MG/DL (ref 0.5–1.4)
CTP QC/QA: YES
DIFFERENTIAL METHOD: ABNORMAL
EOSINOPHIL # BLD AUTO: 0 K/UL (ref 0–0.5)
EOSINOPHIL NFR BLD: 0.3 % (ref 0–8)
ERYTHROCYTE [DISTWIDTH] IN BLOOD BY AUTOMATED COUNT: 15.5 % (ref 11.5–14.5)
EST. GFR  (AFRICAN AMERICAN): >60 ML/MIN/1.73 M^2
EST. GFR  (NON AFRICAN AMERICAN): >60 ML/MIN/1.73 M^2
GLUCOSE SERPL-MCNC: 91 MG/DL (ref 70–110)
HCT VFR BLD AUTO: 37.6 % (ref 37–48.5)
HGB BLD-MCNC: 12.1 G/DL (ref 12–16)
IMM GRANULOCYTES # BLD AUTO: 0.05 K/UL (ref 0–0.04)
IMM GRANULOCYTES NFR BLD AUTO: 0.4 % (ref 0–0.5)
LIPASE SERPL-CCNC: 17 U/L (ref 4–60)
LYMPHOCYTES # BLD AUTO: 2.1 K/UL (ref 1–4.8)
LYMPHOCYTES NFR BLD: 15.4 % (ref 18–48)
MCH RBC QN AUTO: 29.9 PG (ref 27–31)
MCHC RBC AUTO-ENTMCNC: 32.2 G/DL (ref 32–36)
MCV RBC AUTO: 93 FL (ref 82–98)
MONOCYTES # BLD AUTO: 0.7 K/UL (ref 0.3–1)
MONOCYTES NFR BLD: 5.1 % (ref 4–15)
NEUTROPHILS # BLD AUTO: 10.8 K/UL (ref 1.8–7.7)
NEUTROPHILS NFR BLD: 78.4 % (ref 38–73)
NRBC BLD-RTO: 0 /100 WBC
PLATELET # BLD AUTO: 311 K/UL (ref 150–350)
PMV BLD AUTO: 10.3 FL (ref 9.2–12.9)
POTASSIUM SERPL-SCNC: 3.9 MMOL/L (ref 3.5–5.1)
PROT SERPL-MCNC: 7.9 G/DL (ref 6–8.4)
RBC # BLD AUTO: 4.05 M/UL (ref 4–5.4)
SODIUM SERPL-SCNC: 137 MMOL/L (ref 136–145)
TROPONIN I SERPL DL<=0.01 NG/ML-MCNC: <0.006 NG/ML (ref 0–0.03)
WBC # BLD AUTO: 13.8 K/UL (ref 3.9–12.7)

## 2020-04-24 PROCEDURE — 93005 ELECTROCARDIOGRAM TRACING: CPT

## 2020-04-24 PROCEDURE — 80053 COMPREHEN METABOLIC PANEL: CPT

## 2020-04-24 PROCEDURE — 99284 PR EMERGENCY DEPT VISIT,LEVEL IV: ICD-10-PCS | Mod: ,,, | Performed by: PHYSICIAN ASSISTANT

## 2020-04-24 PROCEDURE — 84484 ASSAY OF TROPONIN QUANT: CPT

## 2020-04-24 PROCEDURE — 83690 ASSAY OF LIPASE: CPT

## 2020-04-24 PROCEDURE — 81025 URINE PREGNANCY TEST: CPT | Performed by: PHYSICIAN ASSISTANT

## 2020-04-24 PROCEDURE — 85025 COMPLETE CBC W/AUTO DIFF WBC: CPT

## 2020-04-24 PROCEDURE — 99284 EMERGENCY DEPT VISIT MOD MDM: CPT | Mod: ,,, | Performed by: PHYSICIAN ASSISTANT

## 2020-04-24 PROCEDURE — 99284 EMERGENCY DEPT VISIT MOD MDM: CPT | Mod: 25

## 2020-04-24 PROCEDURE — 25000003 PHARM REV CODE 250: Performed by: PHYSICIAN ASSISTANT

## 2020-04-24 RX ORDER — MAG HYDROX/ALUMINUM HYD/SIMETH 200-200-20
30 SUSPENSION, ORAL (FINAL DOSE FORM) ORAL 3 TIMES DAILY PRN
Qty: 354 ML | Refills: 1 | Status: SHIPPED | OUTPATIENT
Start: 2020-04-24 | End: 2021-02-17

## 2020-04-24 RX ORDER — MAG HYDROX/ALUMINUM HYD/SIMETH 200-200-20
30 SUSPENSION, ORAL (FINAL DOSE FORM) ORAL
Status: COMPLETED | OUTPATIENT
Start: 2020-04-24 | End: 2020-04-24

## 2020-04-24 RX ORDER — FAMOTIDINE 20 MG/1
20 TABLET, FILM COATED ORAL
Status: COMPLETED | OUTPATIENT
Start: 2020-04-24 | End: 2020-04-24

## 2020-04-24 RX ORDER — FAMOTIDINE 20 MG/1
20 TABLET, FILM COATED ORAL DAILY
Qty: 20 TABLET | Refills: 0 | Status: SHIPPED | OUTPATIENT
Start: 2020-04-24 | End: 2020-04-28 | Stop reason: HOSPADM

## 2020-04-24 RX ADMIN — ALUMINUM HYDROXIDE, MAGNESIUM HYDROXIDE, AND SIMETHICONE 30 ML: 200; 200; 20 SUSPENSION ORAL at 12:04

## 2020-04-24 RX ADMIN — FAMOTIDINE 20 MG: 20 TABLET, FILM COATED ORAL at 12:04

## 2020-04-24 NOTE — PROVIDER PROGRESS NOTES - EMERGENCY DEPT.
Encounter Date: 4/24/2020    ED Physician Progress Notes         EKG - STEMI Decision  Initial Reading: No STEMI present.    ____________________  Rubin Bunch MD, [facility]  Emergency Medicine Staff  11:44 AM 4/24/2020

## 2020-04-24 NOTE — ED NOTES
"Patient identifiers verified and correct for Ms Vargas  C/C: Mid upper epigastric CP SEE NN  APPEARANCE: awake and alert in NAD.  SKIN: warm, dry and intact. No breakdown or bruising.  MUSCULOSKELETAL: Patient moving all extremities spontaneously, no obvious swelling or deformities noted. Ambulates independently.  RESPIRATORY: Denies shortness of breath.Respirations unlabored. Denies cough Denies fevers  CARDIAC: Positive midsternal chest "burning" CP, 2+ distal pulses; no peripheral edema   ABDOMEN: Abdominal pain that radiates to mid chest, denies nausea, denies vomiting. Denies diarrhea.  : voids spontaneously, denies difficulty  Neurologic: AAO x 4; follows commands equal strength in all extremities; denies numbness/tingling. Denies dizziness Denies weakness    "

## 2020-04-24 NOTE — ED TRIAGE NOTES
"Patient states mid upper chest pain with "burning" onset yesterday. States pain "travels" intermittently Pepto today with no relief. Denies diarrhea. Denies fevers.   "

## 2020-04-24 NOTE — DISCHARGE INSTRUCTIONS
Take medications as instructed. Avoid foods that are fried/spicy, or coffee, alcohol. Follow up with primary provider. If symptoms worsen, or if you develop new pain or fever, return to the ED.    Our goal in the emergency department is to always give you outstanding care and exceptional service. You may receive a survey by mail or e-mail in the next week regarding your experience in our ED. We would greatly appreciate your completing and returning the survey. Your feedback provides us with a way to recognize our staff who give very good care and it helps us learn how to improve when your experience was below our aspiration of excellence.

## 2020-04-26 NOTE — ED PROVIDER NOTES
"Encounter Date: 4/24/2020       History     Chief Complaint   Patient presents with    Abdominal Pain     Patient reports "burning" abdominal pain x two days. Patient reports taking pepto-bismol without relief.      Ms Vargas is a 35yoF Who presents for intermittent epigastric discomfort x several days; pertinent PMHx obesity.  Patient reports onset of intermittent epigastric discomfort described as burning with radiation into chest over the past few days.  Not necessarily made worse postprandial.  Associated with increased belching and mild nausea.  Patient has tried Pepto-Bismol with chest temporarily relieved symptoms, though they return.  Denies associated fever/chills, chest pain, shortness of breath, cough, constipation.  No abdominal surgical history.  The patients available PMH, PSH, Social History, medications, allergies, and triage vital signs were reviewed just prior to their medical evaluation.  A ten point review of systems was completed and is negative except as documented above.  Patient denies any other acute medical complaint.    Please be advised this text was dictated with [employer] software and may contain errors due to translation.           Review of patient's allergies indicates:  No Known Allergies  Past Medical History:   Diagnosis Date    Acne     Obesity      History reviewed. No pertinent surgical history.  Family History   Problem Relation Age of Onset    Diabetes Father     Hypertension Father     Hypertension Mother     Kidney disease Mother     Heart disease Mother     Crohn's disease Sister     Aneurysm Maternal Grandmother     Heart disease Maternal Grandfather     Heart disease Paternal Grandmother     Breast cancer Neg Hx     Colon cancer Neg Hx     Ovarian cancer Neg Hx     Melanoma Neg Hx      Social History     Tobacco Use    Smoking status: Never Smoker    Smokeless tobacco: Never Used   Substance Use Topics    Alcohol use: Yes     Alcohol/week: 2.0 standard " drinks     Types: 2 Shots of liquor per week     Comment: Once a week    Drug use: No     Review of Systems   Constitutional: Negative for chills and fever.   HENT: Negative for sore throat and trouble swallowing.    Respiratory: Negative for cough and shortness of breath.    Cardiovascular: Negative for chest pain.   Gastrointestinal: Positive for abdominal pain. Negative for constipation and vomiting. Nausea: mild.   Genitourinary: Negative for dysuria, flank pain and hematuria.   Musculoskeletal: Negative for back pain.   Skin: Negative for pallor and rash.   Neurological: Negative for dizziness and weakness.   Psychiatric/Behavioral: Negative for confusion.       Physical Exam     Initial Vitals [04/24/20 1113]   BP Pulse Resp Temp SpO2   (!) 175/84 (!) 113 17 99.2 °F (37.3 °C) 100 %      MAP       --         Physical Exam    Vitals reviewed.  Constitutional: She appears well-developed and well-nourished. She is not diaphoretic. No distress.   HENT:   Head: Normocephalic and atraumatic.   Right Ear: External ear normal.   Left Ear: External ear normal.   Mouth/Throat: Oropharynx is clear and moist. No oropharyngeal exudate.   Eyes: Conjunctivae and EOM are normal. Pupils are equal, round, and reactive to light. No scleral icterus.   Cardiovascular: Normal rate, regular rhythm and intact distal pulses.   Pulmonary/Chest: Breath sounds normal. No respiratory distress. She has no wheezes. She has no rhonchi. She has no rales.   Abdominal: Soft. Bowel sounds are normal. She exhibits no distension. There is tenderness (mild, epigastric). There is no rebound and no guarding.   Remainder of abdomen unremarkable, neg [place] sign   Musculoskeletal: Normal range of motion. She exhibits no edema.   Neurological: She is alert and oriented to person, place, and time. She has normal strength.   Skin: Skin is warm and dry. Capillary refill takes less than 2 seconds. No rash noted. No erythema.   Psychiatric: She has a normal  mood and affect. Her behavior is normal. Judgment and thought content normal.         ED Course   Procedures  Labs Reviewed   CBC W/ AUTO DIFFERENTIAL - Abnormal; Notable for the following components:       Result Value    WBC 13.80 (*)     RDW 15.5 (*)     Gran # (ANC) 10.8 (*)     Immature Grans (Abs) 0.05 (*)     Gran% 78.4 (*)     Lymph% 15.4 (*)     All other components within normal limits   COMPREHENSIVE METABOLIC PANEL - Abnormal; Notable for the following components:    CO2 20 (*)     All other components within normal limits   TROPONIN I   LIPASE   POCT URINE PREGNANCY        ECG Results          EKG 12-lead (Final result)  Result time 04/24/20 12:54:40    Final result by Interface, Lab In [facility] (04/24/20 12:54:40)                 Narrative:    Test Reason : R07.9,    Vent. Rate : 091 BPM     Atrial Rate : 091 BPM     P-R Int : 140 ms          QRS Dur : 070 ms      QT Int : 380 ms       P-R-T Axes : 075 044 054 degrees     QTc Int : 467 ms    Normal sinus rhythm  Normal ECG  No previous ECGs available  Confirmed by Albert BURGOS, Hunter ([id number]) on 4/24/2020 12:54:35 PM    Referred By: [email or web address]   SELF           Confirmed By:Hunter Price MD                            Imaging Results    None          Medical Decision Making:   History:   Old Medical Records: I decided to obtain old medical records.  Old Records Summarized: records from clinic visits.  Initial Assessment:   Patient presents for epigastric burning over the past few days, non peritonitic abdomen.  VSS, afebrile  Differential Diagnosis:   DDx GERD, gastritis/PUD, biliary colic, pancreatitis. Physical exam and history taking lower clinical suspicion for cholecystitis/cholangitis, hepatitis, acute abdomen, ACS.  Independently Interpreted Test(s):   I have ordered and independently interpreted EKG Reading(s) - see prior notes  Clinical Tests:   Lab Tests: Ordered and Reviewed  Medical Tests: Ordered and Reviewed  ED Management:  Labs show minimal  leukocytosis, otherwise unremarkable.  Liver labs WNL.  Lipase and troponin WNL.  EKG without acute ischemic changes, normal intervals, normal axis.  Patient reports relief with Maalox and Pepcid. Given exam, relief with medications, labs- Highly suspect GERD, gastritis at this time, though future cholecystitis not excluded. No emergent imaging indicated at this time. Recommended f/u to PCP, return for new or developing symptoms. D/c with maalox and pepcid. Patient agreed to plan of care and voiced understanding. Discharged in stable condition with strict ED return precautions.    Kenia Tovar PA-C  04/26/2020    I discussed the following case, diagnosis and plan of care with attending physician.                                   Clinical Impression:       ICD-10-CM ICD-9-CM   1. Abdominal discomfort, epigastric R10.13 789.06   2. Chest pain R07.9 786.50         Disposition:   Disposition: Discharged  Condition: Stable     ED Disposition Condition    Discharge Stable        ED Prescriptions     Medication Sig Dispense Start Date End Date Auth. Provider    famotidine (PEPCID) 20 MG tablet Take 1 tablet (20 mg total) by mouth once daily. for 20 doses 20 tablet 4/24/2020 5/14/2020 Kenia Tovar PA-C    aluminum-magnesium hydroxide-simethicone (MAALOX) 200-200-20 mg/5 mL Susp Take 30 mLs by mouth 3 (three) times daily as needed. 354 mL 4/24/2020 4/24/2021 Kenia Tovar PA-C        Follow-up Information     Follow up With Specialties Details Why Contact Info    [facility] Emergency Medicine Go to  Return to the ER immediately, If symptoms worsen or new symptoms occur [address] [facility] [id number] [phone]    Rima Madrigal MD Internal Medicine Go in 1 week if mild symptoms continue [address]  [place] [address]  [phone]                                       Kenia Tovar PA-C  04/26/20 0838

## 2020-04-28 ENCOUNTER — OFFICE VISIT (OUTPATIENT)
Dept: PRIMARY CARE CLINIC | Facility: CLINIC | Age: 35
End: 2020-04-28
Payer: COMMERCIAL

## 2020-04-28 DIAGNOSIS — K21.9 GASTROESOPHAGEAL REFLUX DISEASE WITHOUT ESOPHAGITIS: Primary | ICD-10-CM

## 2020-04-28 PROCEDURE — 99212 PR OFFICE/OUTPT VISIT, EST, LEVL II, 10-19 MIN: ICD-10-PCS | Mod: 95,,, | Performed by: FAMILY MEDICINE

## 2020-04-28 PROCEDURE — 99212 OFFICE O/P EST SF 10 MIN: CPT | Mod: 95,,, | Performed by: FAMILY MEDICINE

## 2020-04-28 RX ORDER — OMEPRAZOLE 40 MG/1
40 CAPSULE, DELAYED RELEASE ORAL DAILY
Qty: 90 CAPSULE | Refills: 0 | Status: ON HOLD | OUTPATIENT
Start: 2020-04-28 | End: 2020-05-11 | Stop reason: SDUPTHER

## 2020-04-28 NOTE — PROGRESS NOTES
Subjective:       Patient ID: Kaylin Vargas is a 35 y.o. female.    Chief Complaint:  Epigastric discomfort    The patient location is: home  The chief complaint leading to consultation is:  Epigastric discomfort  Visit type: audiovisual  Total time spent with patient: 10 mins  Each patient to whom he or she provides medical services by telemedicine is:  (1) informed of the relationship between the physician and patient and the respective role of any other health care provider with respect to management of the patient; and (2) notified that he or she may decline to receive medical services by telemedicine and may withdraw from such care at any time.    Notes: 34 yo females went to the emergency room on 4/24/2020 for intermittent epigastric discomfort described as burning with radiation into chest. She reports excess burping, belching and twisting/ spasms.  No association foods.  Relief with Pepto-Bismol and Pepcid although she still continues to have symptoms. Her cardiac workup was negative.     The following portions of the patient's history were reviewed and updated as appropriate: allergies, current medications, past medical history and problem list.      Review of Systems   Constitutional: Negative for activity change, appetite change, chills, diaphoresis, fatigue, fever and unexpected weight change.   Respiratory: Negative for cough, choking, chest tightness, shortness of breath, wheezing and stridor.    Cardiovascular: Negative for chest pain, palpitations and leg swelling.   Gastrointestinal: Positive for abdominal pain. Negative for constipation, diarrhea and vomiting.   Genitourinary: Negative for difficulty urinating.   Skin: Negative for rash.   Neurological: Negative for light-headedness, numbness and headaches.   Psychiatric/Behavioral: Negative for confusion.       Objective:     There were no vitals filed for this visit.  Physical Exam   Constitutional: She is oriented to person, place, and  time. She appears well-developed and well-nourished. No distress.   Pulmonary/Chest: Effort normal.   Neurological: She is alert and oriented to person, place, and time.   Psychiatric: She has a normal mood and affect.       Assessment:       1. Gastroesophageal reflux disease without esophagitis        Plan:       1. Gastroesophageal reflux disease without esophagitis  -     omeprazole (PRILOSEC) 40 MG capsule; Take 1 capsule (40 mg total) by mouth once daily.  Dispense: 90 capsule; Refill: 0  Stop  Famotidine.  Will give a trial of omeprazole.  If symptoms persist follow-up with Gastroenterology.  -     Ambulatory referral/consult to Gastroenterology; Future; Expected date: 05/05/2020    Disclaimer: This note has been generated using voice-recognition software. There may be typographical errors that have been missed during proof-reading

## 2020-04-30 ENCOUNTER — HOSPITAL ENCOUNTER (EMERGENCY)
Facility: HOSPITAL | Age: 35
Discharge: HOME | End: 2020-04-30
Attending: EMERGENCY MEDICINE
Payer: COMMERCIAL

## 2020-04-30 VITALS
RESPIRATION RATE: 18 BRPM | OXYGEN SATURATION: 99 % | HEIGHT: 66 IN | HEART RATE: 108 BPM | TEMPERATURE: 99 F | WEIGHT: 215 LBS | DIASTOLIC BLOOD PRESSURE: 108 MMHG | SYSTOLIC BLOOD PRESSURE: 159 MMHG | BODY MASS INDEX: 34.55 KG/M2

## 2020-04-30 DIAGNOSIS — R10.13 EPIGASTRIC PAIN: ICD-10-CM

## 2020-04-30 DIAGNOSIS — R07.9 CHEST PAIN: ICD-10-CM

## 2020-04-30 DIAGNOSIS — R14.2 ERUCTATION: Primary | ICD-10-CM

## 2020-04-30 LAB
B-HCG UR QL: NEGATIVE
CTP QC/QA: YES

## 2020-04-30 PROCEDURE — 25000003 PHARM REV CODE 250: Performed by: PHYSICIAN ASSISTANT

## 2020-04-30 PROCEDURE — 99284 EMERGENCY DEPT VISIT MOD MDM: CPT | Mod: 25

## 2020-04-30 PROCEDURE — 99285 EMERGENCY DEPT VISIT HI MDM: CPT | Mod: ,,, | Performed by: PHYSICIAN ASSISTANT

## 2020-04-30 PROCEDURE — 93010 EKG 12-LEAD: ICD-10-PCS | Mod: ,,, | Performed by: INTERNAL MEDICINE

## 2020-04-30 PROCEDURE — 93010 ELECTROCARDIOGRAM REPORT: CPT | Mod: ,,, | Performed by: INTERNAL MEDICINE

## 2020-04-30 PROCEDURE — 81025 URINE PREGNANCY TEST: CPT | Performed by: PHYSICIAN ASSISTANT

## 2020-04-30 PROCEDURE — 99285 PR EMERGENCY DEPT VISIT,LEVEL V: ICD-10-PCS | Mod: ,,, | Performed by: PHYSICIAN ASSISTANT

## 2020-04-30 PROCEDURE — 93005 ELECTROCARDIOGRAM TRACING: CPT

## 2020-04-30 RX ADMIN — ALUMINUM HYDROXIDE, MAGNESIUM HYDROXIDE, AND SIMETHICONE: 200; 200; 20 SUSPENSION ORAL at 08:04

## 2020-04-30 NOTE — ED TRIAGE NOTES
"Patient states mid upper epigastric chest "burning" and upper abd pain onset 4/24, seen in ED for same. Taking Prilosec as rx. States tightness goes "up and down" Denies nausea or vomiting. States she feels like "food" in her esophagus, burping releases her bloating feeling.   "

## 2020-04-30 NOTE — ED NOTES
Patient identifiers verified and correct for Ms Vargas  C/C: Mid upper epigastric and abdominal pain SEE NN  APPEARANCE: awake and alert in NAD.  SKIN: warm, dry and intact. No breakdown or bruising.  MUSCULOSKELETAL: Patient moving all extremities spontaneously, no obvious swelling or deformities noted. Ambulates independently.  RESPIRATORY: Positive intermittent shortness of breath.Respirations unlabored. Denies cough, deneis fevers  CARDIAC: Positive midsternal CP, 2+ distal pulses; no peripheral edema  ABDOMEN: Abdomen round, positive nausea and distention, denies vomiting, Last Bm Tuesday, poor appetitie  : voids spontaneously, denies difficulty  Neurologic: AAO x 4; follows commands equal strength in all extremities; denies numbness/tingling. Denies dizziness Denies weakness

## 2020-05-01 NOTE — DISCHARGE INSTRUCTIONS
Imaging Results              X-Ray Chest AP Portable (Final result)  Result time 04/30/20 20:25:10      Final result by Fidencio Ellis MD (04/30/20 20:25:10)                   Impression:      No acute cardiopulmonary process.    Electronically signed by resident: Avelino Haro  Date:    04/30/2020  Time:    20:18    Electronically signed by: Fidencio Ellis MD  Date:    04/30/2020  Time:    20:25               Narrative:    EXAMINATION:  XR CHEST AP PORTABLE    CLINICAL HISTORY:  chest pain;    TECHNIQUE:  Single view radiograph of the chest.    COMPARISON:  CT abdomen pelvis 07/08/2010    FINDINGS:  Heart size is normal.  Lungs are symmetrically expanded.  No focal consolidation, pneumothorax, or pleural effusion.  Osseous structures appear intact.                                    Continue prolisec for a total of 4 weeks.   Read tips on how to control acid reflux.   Stay hydrated by drinking plenty of water.  Follow up closely with Gastroenterology for further evaluation.  Return promptly to the emergency room for new or worsening symptoms such as a change in your pain.  Future Appointments   Date Time Provider Department Center   5/7/2020  9:30 AM Jovan Lopez MD [facility] ENT [place]   7/10/2020  9:30 AM Darrion Olvera MD [facility]   Our goal in the emergency department is to always give you outstanding care and exceptional service. You may receive a survey by mail or e-mail in the next week regarding your experience in our ED. We would greatly appreciate your completing and returning the survey. Your feedback provides us with a way to recognize our staff who give very good care and it helps us learn how to improve when your experience was below our aspiration of excellence.

## 2020-05-01 NOTE — ED PROVIDER NOTES
"Encounter Date: 4/30/2020       History     Chief Complaint   Patient presents with    Chest Pain     was seen here last week for the same thing, still having the pain     7:18 PM    Patient is a 35-year-old female who presents the ED with chest pain.  States that her symptoms today are similar to the symptoms she had 6 days ago when she presented to the ED for similar complaints.  She had a negative lipase and troponin as well as CBC and CMP at the time.  Patient works here at [employer] in [employer].  She states that she has been very stressed out.  States that her symptoms have been persistent all day long and somewhat worse after eating.  States that she had jennifer for lunch which made her symptoms "bad".  She is complaining of 6/10 pressure that goes up and down from her epigastric region to her throat.  She "felt something coming back up" constantly.  Feels a "bulge" in her throat.  Has been compliant with Prilosec for the past 6 days and Pepto-Bismol p.r.n.  She denies any new symptoms in the past 6 days.  Admits that her symptoms have not changed, but is concerned and wanted to get evaluated again.    She tested positive for COVID-19 in March, but reports resolution of nasal congestion, cough, and shortness of breath.  She tested positive for antibodies 8 days ago.    Future Appointments  5/7/2020   9:30 AM    Jovan Lopez MD       [facility]       [place]  7/10/2020  9:30 AM    Darrion Olvera MD              [facility]    [place]          Review of patient's allergies indicates:  No Known Allergies  Past Medical History:   Diagnosis Date    Acne     Obesity      History reviewed. No pertinent surgical history.  Family History   Problem Relation Age of Onset    Diabetes Father     Hypertension Father     Hypertension Mother     Kidney disease Mother     Heart disease Mother     Crohn's disease Sister     Aneurysm Maternal Grandmother     Heart disease Maternal Grandfather     Heart disease " "Paternal Grandmother     Breast cancer Neg Hx     Colon cancer Neg Hx     Ovarian cancer Neg Hx     Melanoma Neg Hx      Social History     Tobacco Use    Smoking status: Never Smoker    Smokeless tobacco: Never Used   Substance Use Topics    Alcohol use: Yes     Alcohol/week: 2.0 standard drinks     Types: 2 Shots of liquor per week     Comment: Once a week    Drug use: No     Review of Systems   Constitutional: Negative for chills, diaphoresis and fever.   HENT: Positive for sore throat ("buldge" sensation in throat).    Respiratory: Negative for cough and shortness of breath.    Cardiovascular: Positive for chest pain.   Gastrointestinal: Positive for abdominal pain. Negative for nausea and vomiting.   Genitourinary: Negative for dysuria.   Musculoskeletal: Negative for back pain.   Skin: Negative for rash.   Neurological: Negative for weakness.   Hematological: Does not bruise/bleed easily.       Physical Exam     Initial Vitals [04/30/20 1835]   BP Pulse Resp Temp SpO2   (!) 159/108 108 18 98.6 °F (37 °C) 99 %      MAP       --         Physical Exam    Vitals reviewed.  Constitutional: She appears well-developed and well-nourished. She is not diaphoretic.  Non-toxic appearance. She does not have a sickly appearance. She does not appear ill. No distress. Face mask in place.   HENT:   Head: Normocephalic and atraumatic.   Nose: Nose normal.   Eyes: Conjunctivae and EOM are normal.   Neck: Normal range of motion.   Cardiovascular: Normal rate.   Pulmonary/Chest: Breath sounds normal. No respiratory distress. She has no wheezes. She has no rales.   Abdominal: Soft. She exhibits no distension. There is no tenderness. There is no rigidity, no rebound, no guarding and negative Francois's sign.   Eructated once during my exam.    Musculoskeletal: Normal range of motion.   Neurological: She is alert. She has normal strength.   Skin: Skin is warm and dry. No erythema. No pallor.   Psychiatric: Her speech is normal " and behavior is normal. Thought content normal. Her mood appears anxious.         ED Course   Procedures  Labs Reviewed   POCT URINE PREGNANCY        ECG Results          EKG 12-lead (In process)  Result time 05/01/20 07:39:14    In process by Interface, Lab In [facility] (05/01/20 07:39:14)                 Narrative:    Test Reason : R07.9,    Vent. Rate : 098 BPM     Atrial Rate : 098 BPM     P-R Int : 142 ms          QRS Dur : 068 ms      QT Int : 364 ms       P-R-T Axes : 078 044 069 degrees     QTc Int : 464 ms    Normal sinus rhythm  Normal ECG  When compared with ECG of 24-APR-2020 11:41,  No significant change was found    Referred By: [email or web address]   SELF           Confirmed By:                   In process by Interface, Lab In [facility] (05/01/20 07:39:06)                 Narrative:    Test Reason : R07.9,    Vent. Rate : 098 BPM     Atrial Rate : 098 BPM     P-R Int : 142 ms          QRS Dur : 068 ms      QT Int : 364 ms       P-R-T Axes : 078 044 069 degrees     QTc Int : 464 ms    Normal sinus rhythm  Normal ECG  When compared with ECG of 30-APR-2020 18:41,  No significant change was found    Referred By: AAAREFERR   SELF           Confirmed By:                             Imaging Results          X-Ray Chest AP Portable (Final result)  Result time 04/30/20 20:25:10    Final result by Fidencio Ellis MD (04/30/20 20:25:10)                 Impression:      No acute cardiopulmonary process.    Electronically signed by resident: Avelino Haro  Date:    04/30/2020  Time:    20:18    Electronically signed by: Fidencio Ellis MD  Date:    04/30/2020  Time:    20:25             Narrative:    EXAMINATION:  XR CHEST AP PORTABLE    CLINICAL HISTORY:  chest pain;    TECHNIQUE:  Single view radiograph of the chest.    COMPARISON:  CT abdomen pelvis 07/08/2010    FINDINGS:  Heart size is normal.  Lungs are symmetrically expanded.  No focal consolidation, pneumothorax, or pleural effusion.  Osseous structures appear  intact.                                 Medical Decision Making:   History:   Old Medical Records: I decided to obtain old medical records.  Old Records Summarized: records from clinic visits and records from previous admission(s).  Initial Assessment:   Patient is a 35-year-old female who presents the ED with chest pain and abdominal pain for several days.  Differential Diagnosis:   Includes but is not limited to anxiety, stress reaction, acid reflux, gastritis, ulcer disease, unlikely pancreatitis given no tenderness to palpation and very rare alcohol use.  I have considered but do not suspect ACS or heart failure given no risk factors and persistent symptoms as 6 days ago when she had a negative cardiac workup.  Abdomen soft, nontender nondistended.  Negative Francois sign.  Low suspicion for gallbladder disease.  Clinical Tests:   Lab Tests: Reviewed  Radiological Study: Reviewed  Medical Tests: Reviewed and Ordered  ED Management:  EKG with NSR at 97 beats per minute. No ischemic changes.  Normal intervals.  No STEMI.  No change from EKG 6 days ago.    Will obtain chest x-ray, give GI cocktail, and reassess.    UPT negative.  CXR with no acute processes.     Patient updated with results.  She reports great improvement in her symptoms after GI cocktail.  Given history, physical exam, and EKG and chest x-ray today, I doubt any life-threatening or emergent causes.  Her symptoms are most likely related to acid reflux versus gastritis.  I discussed etiology.  She is to continue Prilosec on a daily basis for 4 weeks total.  I recommended OTC antacid such as Maalox or Mylanta.  We discussed lifestyle modifications and other tips to improve acid reflux.  She has a appointment with gastroenterology in July and she should call and schedule a sooner appointment given that this is her 2nd ED visit.  Return to ED precautions given for signs and symptoms as discussed, and she voices her understanding.  All questions were  answered.  Patient comfortable with plan and stable for discharge.                                 Clinical Impression:       ICD-10-CM ICD-9-CM   1. Eructation R14.2 787.3   2. Chest pain R07.9 786.50   3. Epigastric pain R10.13 789.06         Disposition:   Disposition: Discharged  Condition: Stable     ED Disposition Condition    Discharge Stable        ED Prescriptions     None        Follow-up Information     Follow up With Specialties Details Why Contact Info    Rima Madrigal MD Internal Medicine Schedule an appointment as soon as possible for a visit  As needed, If symptoms worsen [address]  [place] [address]  [phone]      [facility]  If symptoms worsen [address] [facility] [id number] [phone]                                     Consuelo Helm PA-C  05/01/20 0745

## 2020-05-02 ENCOUNTER — PATIENT MESSAGE (OUTPATIENT)
Dept: PRIMARY CARE CLINIC | Facility: CLINIC | Age: 35
End: 2020-05-02

## 2020-05-03 ENCOUNTER — TELEPHONE (OUTPATIENT)
Dept: PRIMARY CARE CLINIC | Facility: CLINIC | Age: 35
End: 2020-05-03

## 2020-05-04 ENCOUNTER — OFFICE VISIT (OUTPATIENT)
Dept: GASTROENTEROLOGY | Facility: CLINIC | Age: 35
End: 2020-05-04
Payer: COMMERCIAL

## 2020-05-04 ENCOUNTER — TELEPHONE (OUTPATIENT)
Dept: GASTROENTEROLOGY | Facility: CLINIC | Age: 35
End: 2020-05-04

## 2020-05-04 VITALS
WEIGHT: 211.19 LBS | HEIGHT: 66 IN | RESPIRATION RATE: 18 BRPM | DIASTOLIC BLOOD PRESSURE: 93 MMHG | SYSTOLIC BLOOD PRESSURE: 130 MMHG | HEART RATE: 104 BPM | BODY MASS INDEX: 33.94 KG/M2

## 2020-05-04 DIAGNOSIS — Z71.89 ADVICE GIVEN ABOUT COVID-19 VIRUS BY TELEPHONE: Primary | ICD-10-CM

## 2020-05-04 DIAGNOSIS — R10.13 EPIGASTRIC PAIN: Primary | ICD-10-CM

## 2020-05-04 DIAGNOSIS — R10.11 RUQ PAIN: ICD-10-CM

## 2020-05-04 DIAGNOSIS — K21.9 GASTROESOPHAGEAL REFLUX DISEASE WITHOUT ESOPHAGITIS: Primary | ICD-10-CM

## 2020-05-04 DIAGNOSIS — R07.89 ATYPICAL CHEST PAIN: ICD-10-CM

## 2020-05-04 DIAGNOSIS — R63.4 WEIGHT LOSS, NON-INTENTIONAL: ICD-10-CM

## 2020-05-04 DIAGNOSIS — R13.10 DYSPHAGIA, UNSPECIFIED TYPE: ICD-10-CM

## 2020-05-04 DIAGNOSIS — K21.9 GASTROESOPHAGEAL REFLUX DISEASE WITHOUT ESOPHAGITIS: ICD-10-CM

## 2020-05-04 PROCEDURE — 3008F BODY MASS INDEX DOCD: CPT | Mod: CPTII,S$GLB,, | Performed by: INTERNAL MEDICINE

## 2020-05-04 PROCEDURE — 99203 OFFICE O/P NEW LOW 30 MIN: CPT | Mod: S$GLB,,, | Performed by: INTERNAL MEDICINE

## 2020-05-04 PROCEDURE — 3008F PR BODY MASS INDEX (BMI) DOCUMENTED: ICD-10-PCS | Mod: CPTII,S$GLB,, | Performed by: INTERNAL MEDICINE

## 2020-05-04 PROCEDURE — 99203 PR OFFICE/OUTPT VISIT, NEW, LEVL III, 30-44 MIN: ICD-10-PCS | Mod: S$GLB,,, | Performed by: INTERNAL MEDICINE

## 2020-05-04 PROCEDURE — 99999 PR PBB SHADOW E&M-EST. PATIENT-LVL III: CPT | Mod: PBBFAC,,, | Performed by: INTERNAL MEDICINE

## 2020-05-04 PROCEDURE — 99999 PR PBB SHADOW E&M-EST. PATIENT-LVL III: ICD-10-PCS | Mod: PBBFAC,,, | Performed by: INTERNAL MEDICINE

## 2020-05-04 NOTE — TELEPHONE ENCOUNTER
Seen in ER three times in the past ten days, please convert appt 5/4/20 to virtual visit or cancel if going to Gastroenterology as scheduled same day.   She is COVID - Antibody POSITIVE and needs to be screened before any office visits.

## 2020-05-04 NOTE — TELEPHONE ENCOUNTER
MA spoke to pt regarding appt request. Pt stated she has Gastro appt at another location (in person visit) pt stated she will call back if appt is cancelled and thank MA for calling.     Appt with Dr. Olvera will be cancelled

## 2020-05-04 NOTE — PROGRESS NOTES
"Subjective:       Patient ID: Kaylin Vargas is a 35 y.o. female.    Chief Complaint: Abdominal Pain (upper); Gastroesophageal Reflux; and Chest Pain    This is my first encounter with this pleasant 36 y/o F, who has had 3 recent ER visits for her GI c/o's.     She begins by reporting the epigastric pain, and a burning in her chest.  And the pressure in her throat.  Sometimes it feels like she can't swallow.   And yet, she feels "super hungry."  See the weight chart below, has lost ~ 20 lbs.    FHx - Her daughter (now 18) had issues with her "digestive system" about 13-13 y/o.  She was rx'd something by the pediatrician, and that resolved.               A sister has been dx'd with Crohn's (but doesn't know the severity or extent).             No FHx of GB disease that she knows of.     She had recently been on abx (amoxicillin) for sinuses.  Review of her old meds shows she has been on doxycycline in the past and tolerated it well, as far as she can recall.      The first time (4/24/2020), she developed (rather sudden?) chest tightness while at work.  She was rec'd Pepcid and Maalox.  She f/u'd with PCP, and was rx'd omeprazole.  Then the second ER visit (below), when the GI Cons was advised..    First ER visit, 4/24/2020:   Abdominal Pain      Patient reports "burning" abdominal pain x two days. Patient reports taking pepto-bismol without relief.    Ms Vargas is a 35yoF Who presents for intermittent epigastric discomfort x several days; pertinent PMHx obesity.  Patient reports onset of intermittent epigastric discomfort described as burning with radiation into chest over the past few days.  Not necessarily made worse postprandial.  Associated with increased belching and mild nausea.  Patient has tried Pepto-Bismol with chest temporarily relieved symptoms, though they return.  Denies associated fever/chills, chest pain, shortness of breath, cough, constipation.  No abdominal surgical history.  The patients " available PMH, PSH, Social History, medications, allergies, and triage vital signs were reviewed just prior to their medical evaluation.  A ten point review of systems was completed and is negative except as documented above.  Patient denies any other acute medical complaint.  Clinical Impression:   1. Abdominal discomfort, epigastric R10.13 789.06  2. Chest pain R07.9 786.50    Discharge Stable   ED Prescriptions     Medication     famotidine (PEPCID) 20 MG tablet Take 1 tablet (20 mg total) by mouth once daily. for 20 doses     aluminum-magnesium hydroxide-simethicone (MAALOX) 200-200-20 mg/5 mL Susp Take 30 mLs by mouth 3 (three) times daily as needed.     PCP OV Note, 4/28/2020  [facility] - Primary [facility]   Imelda Albarado MD   Chief Complaint:  Epigastric discomfort  Notes: 36 yo females went to the emergency room on 4/24/2020 for intermittent epigastric discomfort described as burning with radiation into chest. She reports excess burping, belching and twisting/ spasms.  No association foods.  Relief with Pepto-Bismol and Pepcid although she still continues to have symptoms. Her cardiac workup was negative.   Assessment:    1. Gastroesophageal reflux disease without esophagitis     Plan:    1. Gastroesophageal reflux disease without esophagitis  -     omeprazole (PRILOSEC) 40 MG capsule; Take 1 capsule (40 mg total) by mouth once daily.  Dispense: 90 capsule; Refill: 0  Stop  Famotidine.  Will give a trial of omeprazole.  If symptoms persist follow-up with Gastroenterology.  -     Ambulatory referral/consult to Gastroenterology; Future; Expected date: 05/05/2020      Second ER visit, 4/30/2020:   Chest Pain      was seen here last week for the same thing, still having the pain   7:18 PM   Patient is a 35-year-old female who presents the ED with chest pain.  States that her symptoms today are similar to the symptoms she had 6 days ago when she presented to the ED for similar complaints.  She had a  "negative lipase and troponin as well as CBC and CMP at the time.  Patient works here at [employer] in [employer].  She states that she has been very stressed out.  States that her symptoms have been persistent all day long and somewhat worse after eating.  States that she had jennifer for lunch which made her symptoms "bad".  She is complaining of 6/10 pressure that goes up and down from her epigastric region to her throat.  She "felt something coming back up" constantly.  Feels a "bulge" in her throat.  Has been compliant with Prilosec for the past 6 days and Pepto-Bismol p.r.n.  She denies any new symptoms in the past 6 days.  Admits that her symptoms have not changed, but is concerned and wanted to get evaluated again.   She tested positive for COVID-19 in March, but reports resolution of nasal congestion, cough, and shortness of breath.  She tested positive for antibodies 8 days ago.  Patient updated with results.  She reports great improvement in her symptoms after GI cocktail.  Given history, physical exam, and EKG and chest x-ray today, I doubt any life-threatening or emergent causes.  Her symptoms are most likely related to acid reflux versus gastritis.  I discussed etiology.  She is to continue Prilosec on a daily basis for 4 weeks total.  I recommended OTC antacid such as Maalox or Mylanta.  We discussed lifestyle modifications and other tips to improve acid reflux.  She has a appointment with gastroenterology in July and she should call and schedule a sooner appointment given that this is her 2nd ED visit.  Return to ED precautions given for signs and symptoms as discussed, and she voices her understanding.  All questions were answered.  Patient comfortable with plan and stable for discharge.      Kyalin Vargas   to Ana Luisa Ramos MA   5/2/20 9:55 AM   Good Morning,   I'm having issues with swallowing foods   and burning in the chest , a great amount of gas .   I'm not able to eat a alot the past " "couple of days I've loss a great deal of weight that's concerning.   The feeling of food in my throat.   And bad post nasal drip....   Thanks       And the third ER visit, 5/2/2020:  [facility]   Elijah Lewis MD   ED Provider Notes   History   Abdominal Pain      "burning" pain to epigastric area for 2 weeks.  Burning travels up chest to the throat.  Taking Prilosec and Maalox.  Tightning in throat.  Denies vomiting "but I tried to, to get it to go away.  I burp a little bit at times and it's fine"     35-year-old female presents complaining of upper abdominal pain for 2 weeks.  Patient reports burning pain that travels upper chest to her throat.  Patient is taking Prilosec and Maalox without  Relief.  Patient admits to nausea but denies vomiting.  Patient admits to belching a lot.  Clinical Impression:   1. Gastritis, presence of bleeding unspecified, unspecified chronicity, unspecified gastritis type K29.70 535.50  2. Belching R14.2 787.3  ED Prescriptions     Medication     chlorproMAZINE (THORAZINE) 25 MG tablet Take 1 tablet (25 mg total) by mouth 2 (two) times daily. for 14 days 28 tablet 5/2/2020 5/16/2020 Elijah Lewis MD   Follow-up Information     Follow up With Specialties Details Why Contact Info    Craig Mcnulty MD Gastroenterology Schedule an appointment as soon as possible for a visit  For gastric workup   [address]  [address]  [facility] [address] [phone]        Wt Readings from Last 15 Encounters:  05/04/20 : 95.8 kg (211 lb 3.2 oz)  05/02/20 : 95.8 kg (211 lb 3.2 oz)  04/30/20 : 97.5 kg (215 lb)  04/24/20 : 104.3 kg (230 lb)  02/28/20 : 105.2 kg (231 lb 14.8 oz)  10/10/19 : 105.6 kg (232 lb 12.9 oz)  05/26/19 : 102.1 kg (225 lb)  09/26/18 : 104.2 kg (229 lb 11.5 oz)  07/26/18 : 104 kg (229 lb 4.8 oz)  04/26/18 : 104.6 kg (230 lb 9.6 oz)  02/21/18 : 95.3 kg (210 lb)  07/24/17 : 102 kg (224 lb 13.9 oz)  02/22/16 : 101 kg (222 lb 11.2 oz)  01/25/16 : " 100.7 kg (222 lb 0.1 oz)  08/16/13 : 98 kg (216 lb)        Results for VERA SIMPSON (MRN [id number])  4/22/2020 13:51  COVID-19 (SARS CoV-2) IgG Ab: Positive (A)      Reviewed recent CXR:  No evidence of a hiatal hernia.        Review of Systems   Constitutional: Negative for activity change, appetite change, fatigue and fever. Unexpected weight change: See weight table.  Has lost ~20 pounds.   HENT: Positive for sore throat (Occasional burning in the throat.) and trouble swallowing. Negative for dental problem, drooling, mouth sores and voice change.    Eyes: Negative.    Respiratory: Positive for cough (Occasional.) and chest tightness. Negative for choking, shortness of breath, wheezing and stridor.    Cardiovascular: Positive for chest pain.   Gastrointestinal: Positive for abdominal pain (Epig pain / RUQ pain.). Negative for abdominal distention, anal bleeding, blood in stool, constipation, diarrhea, nausea, rectal pain and vomiting.   Genitourinary: Negative.    Musculoskeletal: Negative for arthralgias, joint swelling, myalgias and neck stiffness.   Skin: Negative for color change and rash.   Neurological: Negative for weakness.   Hematological: Negative for adenopathy. Does not bruise/bleed easily.   Psychiatric/Behavioral: Negative for agitation, behavioral problems, confusion, decreased concentration and dysphoric mood.       Objective:      Physical Exam   Constitutional: She is oriented to person, place, and time. She appears well-developed and well-nourished. No distress.   HENT:   Head: Normocephalic.   Nose: Nose normal.   Mouth/Throat: Oropharynx is clear and moist. No oropharyngeal exudate.   Eyes: Conjunctivae are normal. No scleral icterus.   Neck: Neck supple. No tracheal deviation present. No thyromegaly present.   Cardiovascular: Normal rate, regular rhythm, normal heart sounds and intact distal pulses. Exam reveals no gallop.   No murmur heard.  Pulmonary/Chest: Effort normal and breath  sounds normal. She has no wheezes. She has no rales.   Abdominal: Soft. Bowel sounds are normal. She exhibits no distension and no mass. There is tenderness (Epig area / and to R of midline.). There is no guarding.   Musculoskeletal: Normal range of motion.   Lymphadenopathy:     She has no cervical adenopathy.   Neurological: She is alert and oriented to person, place, and time.   Skin: Skin is warm and dry. No rash noted. No erythema.   Psychiatric: She has a normal mood and affect. Her behavior is normal.       Assessment:         Epigastric pain    RUQ pain    Gastroesophageal reflux disease without esophagitis  -     Ambulatory referral/consult to Gastroenterology    Atypical chest pain    Dysphagia, unspecified type    Weight loss, non-intentional       Plan:        1. Increase the omeprazole to BID.   2. Obtain Pepcid Complete, to chew 2 every night (and can take in the daytime PRN).   3. Sched an U/S abdo / GB.   4. Sched for EGD.

## 2020-05-04 NOTE — LETTER
May 4, 2020      Rima Madrigal MD  [address]  [facility] [address]           [facility]  [address] [place] [address]  Phone: [phone]          Patient: Kaylin Vargas   MR Number: [id number]   YOB: 1985   Date of Visit: 5/4/2020       Dear Dr. Rima Madrigal:    Thank you for referring Kaylin Vargas to me for evaluation. Attached you will find relevant portions of my assessment and plan of care.    If you have questions, please do not hesitate to call me. I look forward to following Kaylin Vargas along with you.    Sincerely,    Ramiro Trejo Jr., MD    Enclosure  CC:  No Recipients    If you would like to receive this communication electronically, please contact [email or web address] or [phone] to request more information on [employer] Link access.    For providers and/or their staff who would like to refer a patient to [facility], please contact us through our one-stop-shop provider referral line, [facility], at [phone].    If you feel you have received this communication in error or would no longer like to receive these types of communications, please e-mail [email or web address]

## 2020-05-05 ENCOUNTER — OFFICE VISIT (OUTPATIENT)
Dept: INTERNAL MEDICINE | Facility: CLINIC | Age: 35
End: 2020-05-05
Payer: COMMERCIAL

## 2020-05-05 ENCOUNTER — HOSPITAL ENCOUNTER (OUTPATIENT)
Dept: RADIOLOGY | Facility: HOSPITAL | Age: 35
Discharge: HOME | End: 2020-05-05
Attending: INTERNAL MEDICINE
Payer: COMMERCIAL

## 2020-05-05 ENCOUNTER — LAB VISIT (OUTPATIENT)
Dept: INTERNAL MEDICINE | Facility: CLINIC | Age: 35
End: 2020-05-05
Payer: COMMERCIAL

## 2020-05-05 VITALS
HEIGHT: 66 IN | WEIGHT: 207 LBS | DIASTOLIC BLOOD PRESSURE: 82 MMHG | OXYGEN SATURATION: 99 % | HEART RATE: 116 BPM | BODY MASS INDEX: 33.27 KG/M2 | SYSTOLIC BLOOD PRESSURE: 110 MMHG

## 2020-05-05 DIAGNOSIS — R00.0 TACHYCARDIA WITH HEART RATE 100-120 BEATS PER MINUTE: ICD-10-CM

## 2020-05-05 DIAGNOSIS — K21.9 GASTROESOPHAGEAL REFLUX DISEASE WITHOUT ESOPHAGITIS: ICD-10-CM

## 2020-05-05 DIAGNOSIS — K21.9 GASTRO-ESOPHAGEAL REFLUX DISEASE WITHOUT ESOPHAGITIS: ICD-10-CM

## 2020-05-05 DIAGNOSIS — F51.02 ADJUSTMENT INSOMNIA: ICD-10-CM

## 2020-05-05 DIAGNOSIS — E66.9 OBESITY (BMI 30.0-34.9): ICD-10-CM

## 2020-05-05 DIAGNOSIS — F41.1 ANXIETY IN ACUTE STRESS REACTION: Primary | ICD-10-CM

## 2020-05-05 DIAGNOSIS — Z71.89 ADVICE GIVEN ABOUT COVID-19 VIRUS BY TELEPHONE: ICD-10-CM

## 2020-05-05 DIAGNOSIS — F43.0 ANXIETY IN ACUTE STRESS REACTION: Primary | ICD-10-CM

## 2020-05-05 PROBLEM — F41.9 ANXIETY DISORDER, UNSPECIFIED: Status: ACTIVE | Noted: 2020-04-30

## 2020-05-05 PROBLEM — J30.9 ALLERGIC RHINITIS, UNSPECIFIED: Status: ACTIVE | Noted: 2020-04-30

## 2020-05-05 LAB — SARS-COV-2 RNA RESP QL NAA+PROBE: NOT DETECTED

## 2020-05-05 PROCEDURE — 76700 US EXAM ABDOM COMPLETE: CPT | Mod: TC

## 2020-05-05 PROCEDURE — 99214 PR OFFICE/OUTPT VISIT, EST, LEVL IV, 30-39 MIN: ICD-10-PCS | Mod: S$GLB,,, | Performed by: NURSE PRACTITIONER

## 2020-05-05 PROCEDURE — 99999 PR PBB SHADOW E&M-EST. PATIENT-LVL III: CPT | Mod: PBBFAC,,, | Performed by: NURSE PRACTITIONER

## 2020-05-05 PROCEDURE — U0002 COVID-19 LAB TEST NON-CDC: HCPCS

## 2020-05-05 PROCEDURE — 3008F BODY MASS INDEX DOCD: CPT | Mod: CPTII,S$GLB,, | Performed by: NURSE PRACTITIONER

## 2020-05-05 PROCEDURE — 76700 US EXAM ABDOM COMPLETE: CPT | Mod: 26,,, | Performed by: RADIOLOGY

## 2020-05-05 PROCEDURE — 3008F PR BODY MASS INDEX (BMI) DOCUMENTED: ICD-10-PCS | Mod: CPTII,S$GLB,, | Performed by: NURSE PRACTITIONER

## 2020-05-05 PROCEDURE — 76700 US ABDOMEN COMPLETE: ICD-10-PCS | Mod: 26,,, | Performed by: RADIOLOGY

## 2020-05-05 PROCEDURE — 99214 OFFICE O/P EST MOD 30 MIN: CPT | Mod: S$GLB,,, | Performed by: NURSE PRACTITIONER

## 2020-05-05 PROCEDURE — 99999 PR PBB SHADOW E&M-EST. PATIENT-LVL III: ICD-10-PCS | Mod: PBBFAC,,, | Performed by: NURSE PRACTITIONER

## 2020-05-05 RX ORDER — TRAZODONE HYDROCHLORIDE 50 MG/1
50 TABLET ORAL NIGHTLY PRN
Qty: 30 TABLET | Refills: 1 | Status: SHIPPED | OUTPATIENT
Start: 2020-05-05 | End: 2020-05-22

## 2020-05-05 NOTE — PROGRESS NOTES
Subjective:       Patient ID: Kaylin Vargas is a 35 y.o. female.    Chief Complaint: Gastroesophageal Reflux    Pt here for blood work and other health concerns. PCP Dr. Madrigal.    Pt's main issue is stress. 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 Has not been sleeping that good and has had elevated heart rate and anxiety. She was seen yesterday by GI because she has also recently been having reflux issues. Prior to yesterday's visit went to ER on 4/24, 4/30, and 5/2 for epigastric pain and chest pain. EKGs were normal. Attributed to GERD. She was last given a script for Thorazine BID for 14 days on 5/2. Saw GI yesterday and they started her on 14 day prilosec. Has an abd US today for 545pm to rule out GB issues, and on 5/11 has an EGD scheduled. She has a COVID test scheduled for today prior to EGD next Monday. She admits she was laying down after eating before finding out about the reflux. Has some epigastric abdominal discomfort. Has not had much of an appetite, and has had some weight loss, lost 4 pounds since yesterday's appt.. I reviewed GI's note from yesterday. Has had a nighttime cough.    States at home she also has been checking her BP with a wrist monitor and has had some elevated BP readings. No prior hx of HTN. BP normal today. Was mildly elevated at GI appt yesterday at 130/93.    I have reviewed independently the ER notes and diagnostics from 4/24, 430, and 5/2 and the GI notes from yesterday.    I have reviewed the HPI and ROS info pt entered in portal prior to visit today below    Hypertension   This is a new problem. The current episode started in the past 7 days. The problem has been waxing and waning since onset. The problem is uncontrolled. Associated symptoms include anxiety, chest pain, malaise/fatigue, orthopnea, palpitations and PND. Pertinent negatives include no blurred  vision, headaches, neck pain, peripheral edema, shortness of breath or sweats. Agents associated with hypertension include decongestants. Risk factors for coronary artery disease include family history, stress and obesity. Past treatments include nothing. The current treatment provides no improvement. Compliance problems include diet and psychosocial issues.      Review of Systems   Constitutional: Positive for appetite change, malaise/fatigue and unexpected weight change. Negative for activity change, chills, diaphoresis, fatigue and fever.   HENT: Positive for postnasal drip and trouble swallowing. Negative for sore throat.    Eyes: Negative for blurred vision, pain and redness.   Respiratory: Positive for cough. Negative for chest tightness and shortness of breath.    Cardiovascular: Positive for chest pain, palpitations, orthopnea and PND. Negative for leg swelling.   Gastrointestinal: Positive for abdominal pain and nausea. Negative for abdominal distention, anal bleeding, blood in stool, constipation, diarrhea, rectal pain and vomiting.   Endocrine: Negative for cold intolerance, heat intolerance, polydipsia, polyphagia and polyuria.   Genitourinary: Negative for dysuria.   Musculoskeletal: Negative for arthralgias, back pain, gait problem, myalgias and neck pain.   Skin: Negative for color change, pallor and rash.   Allergic/Immunologic: Negative for environmental allergies, food allergies and immunocompromised state.   Neurological: Negative for dizziness, syncope, weakness, light-headedness, numbness and headaches.   Hematological: Negative for adenopathy. Does not bruise/bleed easily.   Psychiatric/Behavioral: Positive for agitation, decreased concentration and sleep disturbance. Negative for suicidal ideas. The patient is nervous/anxious.        Review of patient's allergies indicates:  No Known Allergies    Current Outpatient Medications:     aluminum-magnesium hydroxide-simethicone (MAALOX) 200-200-20  mg/5 mL Susp, Take 30 mLs by mouth 3 (three) times daily as needed., Disp: 354 mL, Rfl: 1    chlorproMAZINE (THORAZINE) 25 MG tablet, Take 1 tablet (25 mg total) by mouth 2 (two) times daily. for 14 days, Disp: 28 tablet, Rfl: 0    mometasone (NASONEX) 50 mcg/actuation nasal spray, Instill 2 sprays in each nostril every day until directed to stop.  Take only as needed., Disp: 17 g, Rfl: 0    omeprazole (PRILOSEC) 40 MG capsule, Take 1 capsule (40 mg total) by mouth once daily., Disp: 90 capsule, Rfl: 0    Patient Active Problem List   Diagnosis    Obesity (BMI 35.0-39.9 without comorbidity)    BMI 38.0-38.9,adult    Suspected sleep apnea    Acute viral syndrome    Contact w and exposure to oth viral communicable diseases    Gastro-esophageal reflux disease without esophagitis    Anxiety disorder, unspecified    Allergic rhinitis, unspecified     Past Medical History:   Diagnosis Date    Acne     Anxiety disorder, unspecified 4/30/2020    Gastro-esophageal reflux disease without esophagitis 4/30/2020    Obesity      No past surgical history on file.     Social History     Socioeconomic History    Marital status: Single     Spouse name: Not on file    Number of children: 2    Years of education: Not on file    Highest education level: Not on file   Occupational History    Not on file   Social Needs    Financial resource strain: Not hard at all    Food insecurity:     Worry: Never true     Inability: Never true    Transportation needs:     Medical: No     Non-medical: No   Tobacco Use    Smoking status: Never Smoker    Smokeless tobacco: Never Used   Substance and Sexual Activity    Alcohol use: Yes     Alcohol/week: 2.0 standard drinks     Types: 2 Shots of liquor per week     Frequency: Monthly or less     Drinks per session: 1 or 2     Binge frequency: Monthly     Comment: Once a week    Drug use: No    Sexual activity: Yes     Partners: Male   Lifestyle    Physical activity:     Days  "per week: 5 days     Minutes per session: 30 min    Stress: Very much   Relationships    Social connections:     Talks on phone: Once a week     Gets together: Never     Attends [employer] service: Not on file     Active member of club or organization: Yes     Attends meetings of clubs or organizations: More than 4 times per year     Relationship status:    Other Topics Concern    Are you pregnant or think you may be? Not Asked    Breast-feeding Not Asked   Social History Narrative    . Adolescent children.      Family History   Problem Relation Age of Onset    Diabetes Father     Hypertension Father     Hypertension Mother     Kidney disease Mother     Heart disease Mother     Crohn's disease Sister     Aneurysm Maternal Grandmother     Heart disease Maternal Grandfather     Heart disease Paternal Grandmother     Breast cancer Neg Hx     Colon cancer Neg Hx     Ovarian cancer Neg Hx     Melanoma Neg Hx        Objective:       Vitals:    05/05/20 1525   BP: 110/82   Pulse: (!) 116   SpO2: 99%   Weight: 93.9 kg (207 lb)   Height: 5' 6" (1.676 m)   PainSc: 10-Worst pain ever       Body mass index is 33.41 kg/m².    Physical Exam   Constitutional: She is oriented to person, place, and time. Vital signs are normal. She appears well-developed and well-nourished.   obese   HENT:   Head: Normocephalic.   Right Ear: Hearing, tympanic membrane, external ear and ear canal normal.   Left Ear: Hearing, tympanic membrane, external ear and ear canal normal.   Eyes: Pupils are equal, round, and reactive to light. Conjunctivae, EOM and lids are normal. Lids are everted and swept, no foreign bodies found.   Neck: Trachea normal, normal range of motion and full passive range of motion without pain. Neck supple. No JVD present. Carotid bruit is not present.   Cardiovascular: Regular rhythm, S1 normal, S2 normal, normal heart sounds, intact distal pulses and normal pulses. Tachycardia present. Exam reveals " no gallop and no friction rub.   No murmur heard.  Pulmonary/Chest: Effort normal and breath sounds normal.   Abdominal: Soft. Normal appearance and bowel sounds are normal. She exhibits no distension and no mass. There is no hepatosplenomegaly. There is tenderness in the right upper quadrant and epigastric area. There is no rigidity, no rebound, no guarding, no CVA tenderness, no tenderness at McBurney's point and negative Francois's sign. No hernia.   obese   Musculoskeletal: Normal range of motion.   Lymphadenopathy:     She has no cervical adenopathy.   Neurological: She is alert and oriented to person, place, and time. She has normal strength and normal reflexes.   Skin: Skin is warm, dry and intact. Capillary refill takes less than 2 seconds.   Psychiatric: She has a normal mood and affect. Her speech is normal and behavior is normal. Judgment and thought content normal. Cognition and memory are normal.   Nursing note and vitals reviewed.      Assessment:       1. Anxiety in acute stress reaction    2. Gastro-esophageal reflux disease without esophagitis    3. Tachycardia with heart rate 100-120 beats per minute    4. BMI 33.0-33.9,adult    5. Adjustment insomnia    6. Obesity (BMI 30.0-34.9)        Plan:       Kaylin Larson was seen today for gastroesophageal reflux.    Diagnoses and all orders for this visit:    Anxiety in acute stress reaction  -     traZODone (DESYREL) 50 MG tablet; Take 1 tablet (50 mg total) by mouth nightly as needed for Insomnia.    Gastro-esophageal reflux disease without esophagitis  Continue with prilosec as prescribed and EGD scheduled next week    Tachycardia with heart rate 100-120 beats per minute  Normal on exam, recent EKG normal    BMI 33.0-33.9,adult  BMI reviewed    Adjustment insomnia  -     traZODone (DESYREL) 50 MG tablet; Take 1 tablet (50 mg total) by mouth nightly as needed for Insomnia.    Obesity (BMI 30.0-34.9)  BMI reviewed.    Diet and exercise to lose  weight.    Start Trazodone 50 mg at bedtime as needed for sleep and anxiety    Be sure to have Ultrasound done today    EGD as scheduled    Avoid triggers to GERD see info below    Consider taking some time off through [place], send me the paperwork from  and we will get disability department to process, takes a week    Self care instructions provided in AVS    Follow up if symptoms worsen or fail to improve.

## 2020-05-05 NOTE — PATIENT INSTRUCTIONS
Start Trazodone 50 mg at bedtime as needed for sleep and anxiety    Be sure to have Ultrasound done today    EGD as scheduled    Avoid triggers to GERD see info below    Consider taking some time off through [facility], send me the paperwork from  and we will get disability department to process, takes a week      Lifestyle Changes for Controlling GERD  When you have GERD, stomach acid feels as if its backing up toward your mouth. Whether or not you take medicine to control your GERD, your symptoms can often be improved with lifestyle changes. Talk to your healthcare provider about the following suggestions. These suggestions may help you get relief from your symptoms.      Raise your head  Reflux is more likely to strike when youre lying down flat, because stomach fluid can flow backward more easily. Raising the head of your bed 4 to 6 inches can help. To do this:  · Slide blocks or books under the legs at the head of your bed. Or, place a wedge under the mattress. [employer] can make a suitable wedge for you. The wedge should run from your waist to the top of your head.  · Dont just prop your head on several pillows. This increases pressure on your stomach. It can make GERD worse.  Watch your eating habits  Certain foods may increase the acid in your stomach or relax the lower esophageal sphincter. This makes GERD more likely. Its best to avoid the following if they cause you symptoms:  · Coffee, tea, and carbonated drinks (with and without caffeine)  · Fatty, fried, or spicy food  · Mint, chocolate, onions, and tomatoes  · Peppermint  · Any other foods that seem to irritate your stomach or cause you pain  Relieve the pressure  Tips include the following:  · Eat smaller meals, even if you have to eat more often.  · Dont lie down right after you eat. Wait a few hours for your stomach to empty.  · Avoid tight belts and tight-fitting clothes.  · Lose excess weight.  Tobacco and alcohol  Avoid smoking tobacco  and drinking alcohol. They can make GERD symptoms worse.  Date Last Reviewed: 7/1/2016  © 1692-3783 [employer]. [address] [place] [address], [place] [address]. All rights reserved. This information is not intended as a substitute for professional medical care. Always follow your healthcare professional's instructions.

## 2020-05-06 ENCOUNTER — PATIENT MESSAGE (OUTPATIENT)
Dept: INTERNAL MEDICINE | Facility: CLINIC | Age: 35
End: 2020-05-06

## 2020-05-06 ENCOUNTER — TELEPHONE (OUTPATIENT)
Dept: GASTROENTEROLOGY | Facility: CLINIC | Age: 35
End: 2020-05-06

## 2020-05-06 DIAGNOSIS — Z71.89 ADVICE GIVEN ABOUT COVID-19 VIRUS BY TELEPHONE: Primary | ICD-10-CM

## 2020-05-06 NOTE — TELEPHONE ENCOUNTER
Placed another order for Pre-surgery/procedure COVID testing. Informed pt that she would have to self quarantine until the day of the procedure. Pt verbalized understanding.

## 2020-05-06 NOTE — TELEPHONE ENCOUNTER
----- Message from Concepcion Robledo sent at 5/6/2020 10:47 AM CDT -----  Type:  Patient Returning Call    Who Called:  Patient  Who Left Message for Patient: Abbi Correa  Does the patient know what this is regarding?:  testing  Best Call Back Number:  [phone] (work)  Additional Information:

## 2020-05-07 ENCOUNTER — HOSPITAL ENCOUNTER (EMERGENCY)
Facility: HOSPITAL | Age: 35
Discharge: HOME | End: 2020-05-07
Attending: EMERGENCY MEDICINE
Payer: COMMERCIAL

## 2020-05-07 ENCOUNTER — TELEPHONE (OUTPATIENT)
Dept: OTOLARYNGOLOGY | Facility: CLINIC | Age: 35
End: 2020-05-07

## 2020-05-07 VITALS
TEMPERATURE: 99 F | HEIGHT: 66 IN | DIASTOLIC BLOOD PRESSURE: 87 MMHG | OXYGEN SATURATION: 100 % | SYSTOLIC BLOOD PRESSURE: 147 MMHG | RESPIRATION RATE: 20 BRPM | BODY MASS INDEX: 33.75 KG/M2 | HEART RATE: 81 BPM | WEIGHT: 210 LBS

## 2020-05-07 DIAGNOSIS — R07.9 CHEST PAIN: ICD-10-CM

## 2020-05-07 DIAGNOSIS — K21.9 GASTROESOPHAGEAL REFLUX DISEASE, ESOPHAGITIS PRESENCE NOT SPECIFIED: Primary | ICD-10-CM

## 2020-05-07 LAB
BASOPHILS # BLD AUTO: 0.05 K/UL (ref 0–0.2)
BASOPHILS NFR BLD: 0.5 % (ref 0–1.9)
DIFFERENTIAL METHOD: ABNORMAL
EOSINOPHIL # BLD AUTO: 0.1 K/UL (ref 0–0.5)
EOSINOPHIL NFR BLD: 0.6 % (ref 0–8)
ERYTHROCYTE [DISTWIDTH] IN BLOOD BY AUTOMATED COUNT: 15.2 % (ref 11.5–14.5)
HCT VFR BLD AUTO: 37.3 % (ref 37–48.5)
HGB BLD-MCNC: 12 G/DL (ref 12–16)
IMM GRANULOCYTES # BLD AUTO: 0.03 K/UL (ref 0–0.04)
IMM GRANULOCYTES NFR BLD AUTO: 0.3 % (ref 0–0.5)
LIPASE SERPL-CCNC: 45 U/L (ref 4–60)
LYMPHOCYTES # BLD AUTO: 2.9 K/UL (ref 1–4.8)
LYMPHOCYTES NFR BLD: 26.7 % (ref 18–48)
MCH RBC QN AUTO: 29.7 PG (ref 27–31)
MCHC RBC AUTO-ENTMCNC: 32.2 G/DL (ref 32–36)
MCV RBC AUTO: 92 FL (ref 82–98)
MONOCYTES # BLD AUTO: 0.7 K/UL (ref 0.3–1)
MONOCYTES NFR BLD: 6.4 % (ref 4–15)
NEUTROPHILS # BLD AUTO: 7.2 K/UL (ref 1.8–7.7)
NEUTROPHILS NFR BLD: 65.5 % (ref 38–73)
NRBC BLD-RTO: 0 /100 WBC
PLATELET # BLD AUTO: 278 K/UL (ref 150–350)
PMV BLD AUTO: 11.3 FL (ref 9.2–12.9)
RBC # BLD AUTO: 4.04 M/UL (ref 4–5.4)
TROPONIN I SERPL DL<=0.01 NG/ML-MCNC: <0.006 NG/ML (ref 0–0.03)
WBC # BLD AUTO: 10.97 K/UL (ref 3.9–12.7)

## 2020-05-07 PROCEDURE — 84484 ASSAY OF TROPONIN QUANT: CPT

## 2020-05-07 PROCEDURE — 93010 ELECTROCARDIOGRAM REPORT: CPT | Mod: ,,, | Performed by: INTERNAL MEDICINE

## 2020-05-07 PROCEDURE — 99285 EMERGENCY DEPT VISIT HI MDM: CPT | Mod: 25

## 2020-05-07 PROCEDURE — 85025 COMPLETE CBC W/AUTO DIFF WBC: CPT

## 2020-05-07 PROCEDURE — 93005 ELECTROCARDIOGRAM TRACING: CPT

## 2020-05-07 PROCEDURE — 93010 EKG 12-LEAD: ICD-10-PCS | Mod: ,,, | Performed by: INTERNAL MEDICINE

## 2020-05-07 PROCEDURE — 83690 ASSAY OF LIPASE: CPT

## 2020-05-07 PROCEDURE — 99284 PR EMERGENCY DEPT VISIT,LEVEL IV: ICD-10-PCS | Mod: ,,, | Performed by: EMERGENCY MEDICINE

## 2020-05-07 PROCEDURE — 99284 EMERGENCY DEPT VISIT MOD MDM: CPT | Mod: ,,, | Performed by: EMERGENCY MEDICINE

## 2020-05-07 RX ORDER — HYDROGEN PEROXIDE 3 %
20 SOLUTION, NON-ORAL MISCELLANEOUS
COMMUNITY
End: 2020-05-22

## 2020-05-07 NOTE — ED PROVIDER NOTES
"Encounter Date: 5/7/2020       History     Chief Complaint   Patient presents with    Chest Pain     Pt states " I have gerd and I think it's related".  Pt statses that she has EGD scheduled for Monday, but "just wants it checked out".  Pain increases with deep breath.     34 yo F with GERD and anxiety that presents with chest pain.    Has been experiencing sub-acute chest discomfort since 4/24. Describes discomfort as feeling of heaviness that typically occurs postprandially. Patient has had reflux since 4/24. Has tried prilosec with no improvement and nexium with moderate improvement. Was seen for similar symptoms last week, received GI cocktail with no relief. Is currently being worked up by GI as outpatient and scheduled for EGD on Monday. At last GI visit, she was recommended to increase PPI to BID but patient has only been taking it PRN. Today, she had a sharp retrosternal pain that was different from her typical reflux-related pain. Describes pain as sharp, nonradiating and worse with movement. Doesn't worsen with inspiration. However, since pain was different than her typical pain, she presented to ED. Denies tobacco use, OCP use, PMHx or FHx of blood clots.         Review of patient's allergies indicates:  No Known Allergies  Past Medical History:   Diagnosis Date    Acne     Anxiety disorder, unspecified 4/30/2020    Gastro-esophageal reflux disease without esophagitis 4/30/2020    Obesity      History reviewed. No pertinent surgical history.  Family History   Problem Relation Age of Onset    Diabetes Father     Hypertension Father     Hypertension Mother     Kidney disease Mother     Heart disease Mother     Crohn's disease Sister     Aneurysm Maternal Grandmother     Heart disease Maternal Grandfather     Heart disease Paternal Grandmother     Breast cancer Neg Hx     Colon cancer Neg Hx     Ovarian cancer Neg Hx     Melanoma Neg Hx      Social History     Tobacco Use    Smoking " status: Never Smoker    Smokeless tobacco: Never Used   Substance Use Topics    Alcohol use: Yes     Alcohol/week: 2.0 standard drinks     Types: 2 Shots of liquor per week     Frequency: Monthly or less     Drinks per session: 1 or 2     Binge frequency: Monthly     Comment: Once a week    Drug use: No     Review of Systems   Constitutional: Negative for chills and fever.   HENT: Negative for congestion, sore throat and trouble swallowing.    Eyes: Negative for visual disturbance.   Respiratory: Negative for cough, shortness of breath and wheezing.    Cardiovascular: Positive for chest pain. Negative for palpitations and leg swelling.   Gastrointestinal: Positive for abdominal pain (epigastric). Negative for blood in stool, constipation, diarrhea, nausea and vomiting.   Genitourinary: Negative for dysuria and hematuria.   Musculoskeletal: Negative for arthralgias and myalgias.   Skin: Negative for rash.   Neurological: Negative for dizziness, light-headedness, numbness and headaches.   Psychiatric/Behavioral: Negative for agitation and confusion.       Physical Exam     Initial Vitals [05/07/20 1718]   BP Pulse Resp Temp SpO2   (!) 143/85 96 20 98.4 °F (36.9 °C) 100 %      MAP       --         Physical Exam    Constitutional: No distress.   HENT:   Head: Normocephalic and atraumatic.   Eyes: Conjunctivae are normal. Pupils are equal, round, and reactive to light.   Neck: No stridor present. No tracheal deviation present.   Cardiovascular: Normal rate, regular rhythm, S1 normal, S2 normal and normal heart sounds.   No murmur heard.  Pulmonary/Chest: Breath sounds normal. No respiratory distress. She has no wheezes. She has no rales.   Abdominal: Soft. Bowel sounds are normal. She exhibits no distension. There is no tenderness. There is no guarding.   Neurological: She is alert and oriented to person, place, and time. She has normal strength. No cranial nerve deficit. She exhibits normal muscle tone.   Skin: Skin  is warm, dry and intact. No rash noted.   Psychiatric: She has a normal mood and affect. Her speech is normal and behavior is normal.         ED Course   Procedures  Labs Reviewed   CBC W/ AUTO DIFFERENTIAL - Abnormal; Notable for the following components:       Result Value    RDW 15.2 (*)     All other components within normal limits   TROPONIN I   LIPASE        ECG Results          EKG 12-lead (Final result)  Result time 05/07/20 22:01:01    Final result by Interface, Lab In [facility] (05/07/20 22:01:01)                 Narrative:    Test Reason : R07.9,    Vent. Rate : 087 BPM     Atrial Rate : 087 BPM     P-R Int : 140 ms          QRS Dur : 072 ms      QT Int : 370 ms       P-R-T Axes : 076 017 049 degrees     QTc Int : 445 ms    Normal sinus rhythm  Normal ECG  When compared with ECG of 02-MAY-2020 17:56,  No significant change was found  Confirmed by MARK PANIAGUA MD ([id number]) on 5/7/2020 10:00:53 PM    Referred By: [email or web address]   SELF           Confirmed By:MARK PANIAGUA MD                            Imaging Results          X-Ray Chest PA And Lateral (Final result)  Result time 05/07/20 18:49:55    Final result by Angus Domínguez MD (05/07/20 18:49:55)                 Impression:      Normal radiographic appearance of the chest.      Electronically signed by: Angus Domínguez MD  Date:    05/07/2020  Time:    18:49             Narrative:    EXAMINATION:  XR CHEST PA AND LATERAL    CLINICAL HISTORY:  Chest Pain;    TECHNIQUE:  PA and lateral views of the chest were performed.    COMPARISON:  Chest radiograph 04/30/2020    FINDINGS:  No detrimental change.The lungs are clear, with normal appearance of pulmonary vasculature and no pleural effusion or pneumothorax.    The cardiac silhouette is normal in size. The hilar and mediastinal contours are unremarkable.    Bones are intact.                                 Medical Decision Making:   Initial Assessment:   36 yo F with GERD that presents with chest pain. Appears that pain is  related to known GERD. She has been taking PPI PRN as opposed to BID as recommended by GI. Sharp pain from today is likely gas-related vs MSK and has since resolved. Low suspicion for ACS or PE (Wells score = 0, no risk factors for PE). Gastritis/enteritis and pancreatitis also possible. EKG shows NSR, no ST changes.   ED Management:  CBC, CMP, troponin, lipase, CXR ordered.    7:00 PM  CBC wnl  Trop, lipase wnl  CXR negative for acute process    Clinical picture more consistent with worsening GERD. Instructed patient to take PPI BID and follow-up with GI on Monday. Patient verbalized understanding and agreed to plan. Patient stable for discharge.              Attending Attestation:   Physician Attestation Statement for Resident:  As the supervising MD   Physician Attestation Statement: I have personally seen and examined this patient.   I agree with the above history. -:   As the supervising MD I agree with the above PE.    As the supervising MD I agree with the above treatment, course, plan, and disposition.   -: The patient presents with episodic chest pain that is sharp and associated with inspiration and movement.  She does have a history of GERD that feels somewhat different.  Pain lasts a second or two then goes away.  It is not associated with exertion.  Her electrocardiogram is  normal.  I highly doubt ischemia.  Her troponin is normal. Pain free in ED Plan discharge.                                  Clinical Impression:       ICD-10-CM ICD-9-CM   1. Gastroesophageal reflux disease, esophagitis presence not specified K21.9 530.81   2. Chest pain R07.9 786.50         Disposition:   Disposition: Discharged     ED Disposition Condition    Discharge Stable        ED Prescriptions     None        Follow-up Information    None                                    Amadeo Mckeon MD  Resident  05/07/20 7350       Craig Brenner MD  05/08/20 [id number]

## 2020-05-07 NOTE — ED TRIAGE NOTES
Patient states mid epigastric pain onset today at work, seen in ED for same 4/30 and 5/2.  Denies cough, denies fevers, states he has been orking with her reflux, states scheduled for EGD Monday for same. Maalox 1 hour PTA

## 2020-05-07 NOTE — ED NOTES
Patient identifiers verified and correct for MS Vargas  C/C: Mid upper epigastric CP SEE NN  APPEARANCE: awake and alert in NAD.  SKIN: warm, dry and intact. No breakdown or bruising.  MUSCULOSKELETAL: Patient moving all extremities spontaneously, no obvious swelling or deformities noted. Ambulates independently.  RESPIRATORY: Denies shortness of breath.Respirations unlabored. Denies cough, denies fevers  CARDIAC: Positive midsternal CP, 2+ distal pulses; no peripheral edema  ABDOMEN: S/ND/NT, Denies nausea  : voids spontaneously, denies difficulty  Neurologic: AAO x 4; follows commands equal strength in all extremities; denies numbness/tingling. Denies dizziness Denies weakness.,

## 2020-05-07 NOTE — PROVIDER PROGRESS NOTES - EMERGENCY DEPT.
Encounter Date: 5/7/2020    ED Physician Progress Notes         EKG - STEMI Decision  Initial Reading: No STEMI present.  Response: No Action Needed.

## 2020-05-08 ENCOUNTER — TELEPHONE (OUTPATIENT)
Dept: GASTROENTEROLOGY | Facility: CLINIC | Age: 35
End: 2020-05-08

## 2020-05-08 NOTE — TELEPHONE ENCOUNTER
Pt informed that [facility] location is closed on Saturday when she would need to have her test performed. Pt states that she will drive to [facility] for scheduled COVID test on 05/09/2020 at 1120. Pt verbalized understanding.

## 2020-05-08 NOTE — TELEPHONE ENCOUNTER
----- Message from Bonny Hernandez sent at 5/8/2020  9:52 AM CDT -----  Good Morning  I am calling patients to register them for the COVID procedural testing. Ms. Vargas lives in [place] and was wondering if she can go to [facility] instead of driving all the way to [place]. Could someone please reach out to her. She also was inquiring about the arrival time the day of the actual procedure.

## 2020-05-08 NOTE — TELEPHONE ENCOUNTER
----- Message from Mirna Goel sent at 5/8/2020  9:02 AM CDT -----  Contact: PT  Type:  Patient Returning Call    Who Called:  PT  Who Left Message for Patient:  Nurse  Does the patient know what this is regarding?:  Testing lab  Best Call Back Number:  [phone]  Additional Information:  Please Advise ---Thank you

## 2020-05-08 NOTE — TELEPHONE ENCOUNTER
----- Message from Bonny Hernandez sent at 5/8/2020  9:52 AM CDT -----  Good Morning  I am calling patients to register them for the COVID procedural testing. Ms. Vargas lives in [place] and was wondering if she can go to [facility] instead of driving all the way to [place]. Could someone please reach out to her. She also was inquiring about the arrival time the day of the actual procedure.

## 2020-05-09 ENCOUNTER — LAB VISIT (OUTPATIENT)
Dept: FAMILY MEDICINE | Facility: CLINIC | Age: 35
End: 2020-05-09
Attending: INTERNAL MEDICINE
Payer: COMMERCIAL

## 2020-05-09 DIAGNOSIS — Z71.89 ADVICE GIVEN ABOUT COVID-19 VIRUS BY TELEPHONE: ICD-10-CM

## 2020-05-09 PROCEDURE — U0002 COVID-19 LAB TEST NON-CDC: HCPCS

## 2020-05-10 LAB — SARS-COV-2 RNA RESP QL NAA+PROBE: NOT DETECTED

## 2020-05-11 ENCOUNTER — TELEPHONE (OUTPATIENT)
Dept: OTOLARYNGOLOGY | Facility: CLINIC | Age: 35
End: 2020-05-11

## 2020-05-11 ENCOUNTER — PATIENT MESSAGE (OUTPATIENT)
Dept: INTERNAL MEDICINE | Facility: CLINIC | Age: 35
End: 2020-05-11

## 2020-05-11 ENCOUNTER — ANESTHESIA (OUTPATIENT)
Dept: ENDOSCOPY | Facility: HOSPITAL | Age: 35
End: 2020-05-11
Payer: COMMERCIAL

## 2020-05-11 ENCOUNTER — PATIENT OUTREACH (OUTPATIENT)
Dept: ADMINISTRATIVE | Facility: OTHER | Age: 35
End: 2020-05-11

## 2020-05-11 ENCOUNTER — ANESTHESIA EVENT (OUTPATIENT)
Dept: ENDOSCOPY | Facility: HOSPITAL | Age: 35
End: 2020-05-11
Payer: COMMERCIAL

## 2020-05-11 ENCOUNTER — HOSPITAL ENCOUNTER (OUTPATIENT)
Facility: HOSPITAL | Age: 35
Discharge: HOME | End: 2020-05-11
Attending: INTERNAL MEDICINE | Admitting: INTERNAL MEDICINE
Payer: COMMERCIAL

## 2020-05-11 VITALS
OXYGEN SATURATION: 100 % | RESPIRATION RATE: 17 BRPM | DIASTOLIC BLOOD PRESSURE: 80 MMHG | WEIGHT: 252 LBS | SYSTOLIC BLOOD PRESSURE: 123 MMHG | BODY MASS INDEX: 40.5 KG/M2 | HEART RATE: 90 BPM | HEIGHT: 66 IN | TEMPERATURE: 97 F

## 2020-05-11 DIAGNOSIS — R10.13 EPIGASTRIC PAIN: ICD-10-CM

## 2020-05-11 DIAGNOSIS — K21.9 GASTROESOPHAGEAL REFLUX DISEASE WITHOUT ESOPHAGITIS: ICD-10-CM

## 2020-05-11 LAB
B-HCG UR QL: NEGATIVE
CTP QC/QA: YES
H PYLORI INDEX VALUE: NEGATIVE

## 2020-05-11 PROCEDURE — 43239 EGD BIOPSY SINGLE/MULTIPLE: CPT | Mod: ,,, | Performed by: INTERNAL MEDICINE

## 2020-05-11 PROCEDURE — 81025 URINE PREGNANCY TEST: CPT | Mod: PO | Performed by: INTERNAL MEDICINE

## 2020-05-11 PROCEDURE — D9220A PRA ANESTHESIA: ICD-10-PCS | Mod: ANES,,, | Performed by: ANESTHESIOLOGY

## 2020-05-11 PROCEDURE — 25000003 PHARM REV CODE 250: Mod: PO | Performed by: INTERNAL MEDICINE

## 2020-05-11 PROCEDURE — 88305 TISSUE EXAM BY PATHOLOGIST: CPT | Performed by: PATHOLOGY

## 2020-05-11 PROCEDURE — 37000008 HC ANESTHESIA 1ST 15 MINUTES: Mod: PO | Performed by: INTERNAL MEDICINE

## 2020-05-11 PROCEDURE — 63600175 PHARM REV CODE 636 W HCPCS: Mod: PO | Performed by: INTERNAL MEDICINE

## 2020-05-11 PROCEDURE — 37000009 HC ANESTHESIA EA ADD 15 MINS: Mod: PO | Performed by: INTERNAL MEDICINE

## 2020-05-11 PROCEDURE — 63600175 PHARM REV CODE 636 W HCPCS: Mod: PO | Performed by: NURSE ANESTHETIST, CERTIFIED REGISTERED

## 2020-05-11 PROCEDURE — 43239 EGD BIOPSY SINGLE/MULTIPLE: CPT | Mod: PO | Performed by: INTERNAL MEDICINE

## 2020-05-11 PROCEDURE — 87449 NOS EACH ORGANISM AG IA: CPT | Mod: PO | Performed by: INTERNAL MEDICINE

## 2020-05-11 PROCEDURE — D9220A PRA ANESTHESIA: Mod: CRNA,,, | Performed by: NURSE ANESTHETIST, CERTIFIED REGISTERED

## 2020-05-11 PROCEDURE — 88305 TISSUE EXAM BY PATHOLOGIST: ICD-10-PCS | Mod: 26,,, | Performed by: PATHOLOGY

## 2020-05-11 PROCEDURE — 88305 TISSUE EXAM BY PATHOLOGIST: CPT | Mod: 26,,, | Performed by: PATHOLOGY

## 2020-05-11 PROCEDURE — 88342 IMHCHEM/IMCYTCHM 1ST ANTB: CPT | Mod: 26,,, | Performed by: PATHOLOGY

## 2020-05-11 PROCEDURE — 88342 IMHCHEM/IMCYTCHM 1ST ANTB: CPT | Performed by: PATHOLOGY

## 2020-05-11 PROCEDURE — D9220A PRA ANESTHESIA: ICD-10-PCS | Mod: CRNA,,, | Performed by: NURSE ANESTHETIST, CERTIFIED REGISTERED

## 2020-05-11 PROCEDURE — D9220A PRA ANESTHESIA: Mod: ANES,,, | Performed by: ANESTHESIOLOGY

## 2020-05-11 PROCEDURE — 43239 PR EGD, FLEX, W/BIOPSY, SGL/MULTI: ICD-10-PCS | Mod: ,,, | Performed by: INTERNAL MEDICINE

## 2020-05-11 PROCEDURE — 88342 CHG IMMUNOCYTOCHEMISTRY: ICD-10-PCS | Mod: 26,,, | Performed by: PATHOLOGY

## 2020-05-11 PROCEDURE — 27201012 HC FORCEPS, HOT/COLD, DISP: Mod: PO | Performed by: INTERNAL MEDICINE

## 2020-05-11 PROCEDURE — 25000003 PHARM REV CODE 250: Mod: PO | Performed by: NURSE ANESTHETIST, CERTIFIED REGISTERED

## 2020-05-11 RX ORDER — MIDAZOLAM HYDROCHLORIDE 1 MG/ML
INJECTION, SOLUTION INTRAMUSCULAR; INTRAVENOUS
Status: DISCONTINUED | OUTPATIENT
Start: 2020-05-11 | End: 2020-05-11

## 2020-05-11 RX ORDER — SUCRALFATE 1 G/1
1 TABLET ORAL
Qty: 120 TABLET | Refills: 11 | Status: SHIPPED | OUTPATIENT
Start: 2020-05-11 | End: 2021-02-17

## 2020-05-11 RX ORDER — GLYCOPYRROLATE 0.2 MG/ML
INJECTION INTRAMUSCULAR; INTRAVENOUS
Status: DISCONTINUED | OUTPATIENT
Start: 2020-05-11 | End: 2020-05-11

## 2020-05-11 RX ORDER — LIDOCAINE HYDROCHLORIDE 20 MG/ML
INJECTION INTRAVENOUS
Status: DISCONTINUED | OUTPATIENT
Start: 2020-05-11 | End: 2020-05-11

## 2020-05-11 RX ORDER — HYOSCYAMINE SULFATE 0.12 MG/1
0.12 TABLET SUBLINGUAL EVERY 4 HOURS PRN
Qty: 20 TABLET | Refills: 11 | Status: SHIPPED | OUTPATIENT
Start: 2020-05-11 | End: 2021-02-17

## 2020-05-11 RX ORDER — FAMOTIDINE 40 MG/1
40 TABLET, FILM COATED ORAL NIGHTLY
Qty: 60 TABLET | Refills: 5 | Status: SHIPPED | OUTPATIENT
Start: 2020-05-11 | End: 2022-07-21

## 2020-05-11 RX ORDER — SODIUM CHLORIDE 0.9 % (FLUSH) 0.9 %
10 SYRINGE (ML) INJECTION
Status: DISCONTINUED | OUTPATIENT
Start: 2020-05-11 | End: 2020-05-11 | Stop reason: HOSPADM

## 2020-05-11 RX ORDER — LIDOCAINE HYDROCHLORIDE 10 MG/ML
1 INJECTION INFILTRATION; PERINEURAL ONCE
Status: COMPLETED | OUTPATIENT
Start: 2020-05-11 | End: 2020-05-11

## 2020-05-11 RX ORDER — PROPOFOL 10 MG/ML
VIAL (ML) INTRAVENOUS
Status: DISCONTINUED | OUTPATIENT
Start: 2020-05-11 | End: 2020-05-11

## 2020-05-11 RX ORDER — SODIUM CHLORIDE, SODIUM LACTATE, POTASSIUM CHLORIDE, CALCIUM CHLORIDE 600; 310; 30; 20 MG/100ML; MG/100ML; MG/100ML; MG/100ML
INJECTION, SOLUTION INTRAVENOUS CONTINUOUS
Status: DISCONTINUED | OUTPATIENT
Start: 2020-05-11 | End: 2020-05-11 | Stop reason: HOSPADM

## 2020-05-11 RX ORDER — OMEPRAZOLE 40 MG/1
40 CAPSULE, DELAYED RELEASE ORAL DAILY
Qty: 90 CAPSULE | Refills: 3 | Status: SHIPPED | OUTPATIENT
Start: 2020-05-11 | End: 2020-06-15

## 2020-05-11 RX ADMIN — MIDAZOLAM HYDROCHLORIDE 2 MG: 1 INJECTION, SOLUTION INTRAMUSCULAR; INTRAVENOUS at 01:05

## 2020-05-11 RX ADMIN — PROPOFOL 80 MG: 10 INJECTION, EMULSION INTRAVENOUS at 12:05

## 2020-05-11 RX ADMIN — PROPOFOL 100 MG: 10 INJECTION, EMULSION INTRAVENOUS at 12:05

## 2020-05-11 RX ADMIN — GLYCOPYRROLATE 0.4 MG: 0.2 INJECTION, SOLUTION INTRAMUSCULAR; INTRAVENOUS at 01:05

## 2020-05-11 RX ADMIN — SODIUM CHLORIDE, SODIUM LACTATE, POTASSIUM CHLORIDE, AND CALCIUM CHLORIDE: .6; .31; .03; .02 INJECTION, SOLUTION INTRAVENOUS at 12:05

## 2020-05-11 RX ADMIN — KETAMINE HYDROCHLORIDE 20 MG: 100 INJECTION, SOLUTION, CONCENTRATE INTRAMUSCULAR; INTRAVENOUS at 01:05

## 2020-05-11 RX ADMIN — LIDOCAINE HYDROCHLORIDE 1 ML: 10 INJECTION, SOLUTION INFILTRATION; PERINEURAL at 01:05

## 2020-05-11 RX ADMIN — LIDOCAINE HYDROCHLORIDE 100 MG: 20 INJECTION, SOLUTION INTRAVENOUS at 12:05

## 2020-05-11 NOTE — ANESTHESIA POSTPROCEDURE EVALUATION
Anesthesia Post Evaluation    Patient: Kaylin Vargas    Procedure(s) Performed: Procedure(s) (LRB):  EGD (ESOPHAGOGASTRODUODENOSCOPY) (N/A)    Final Anesthesia Type: general    Patient location during evaluation: PACU  Patient participation: Yes- Able to Participate  Level of consciousness: sedated and awake  Post-procedure vital signs: reviewed and stable  Pain management: adequate  Airway patency: patent    PONV status at discharge: No PONV  Anesthetic complications: no    Fariha-operative Events Comments: Cough, desaturation intraop - procedure stopped - suctioned - BMV until return of 100% sats - re sedated with Versed/Ketamine.    Also, very poor IV access even with U/S guidance.  Placed left EJ sterile manner intraop.  Cardiovascular status: blood pressure returned to baseline and hypertensive  Respiratory status: spontaneous ventilation  Hydration status: euvolemic  Follow-up not needed.          Vitals Value Taken Time   /86 5/11/2020 11:54 AM   Temp 37.4 °C (99.4 °F) 5/7/2020  7:46 PM   Pulse 90 5/11/2020 11:54 AM   Resp 18 5/11/2020 11:54 AM   SpO2 100 % 5/11/2020 11:54 AM         No case tracking events are documented in the log.      Pain/Rex Score: No data recorded

## 2020-05-11 NOTE — TRANSFER OF CARE
"Anesthesia Transfer of Care Note    Patient: Kaylin Vargas    Procedure(s) Performed: Procedure(s) (LRB):  EGD (ESOPHAGOGASTRODUODENOSCOPY) (N/A)    Patient location: PACU    Anesthesia Type: general    Transport from OR: Transported from OR on room air with adequate spontaneous ventilation    Post pain: adequate analgesia    Post assessment: tolerated procedure well and no apparent anesthetic complications    Post vital signs: unstable    Level of consciousness: awake, alert and oriented    Nausea/Vomiting: no nausea/vomiting    Complications: none    Transfer of care protocol was followed      Last vitals:   Visit Vitals  BP (!) 147/86 (BP Location: Right arm, Patient Position: Lying)   Pulse (P) 100   Temp (P) 36.2 °C (97.1 °F) (Skin)   Resp (P) 14   Ht 5' 6" (1.676 m)   Wt 114.3 kg (252 lb)   LMP 04/28/2020   SpO2 (P) 100%   Breastfeeding? No   BMI 40.67 kg/m²     "

## 2020-05-11 NOTE — TRANSFER OF CARE
"Anesthesia Transfer of Care Note    Patient: Kaylin Vargas    Procedure(s) Performed: Procedure(s) (LRB):  EGD (ESOPHAGOGASTRODUODENOSCOPY) (N/A)    Anesthesia PACU Handoff    Last vitals:   Visit Vitals  BP (!) 147/86 (BP Location: Right arm, Patient Position: Lying)   Pulse 90   Resp 18   Ht 5' 6" (1.676 m)   Wt 114.3 kg (252 lb)   LMP 04/28/2020   SpO2 100%   Breastfeeding? No   BMI 40.67 kg/m²     "

## 2020-05-11 NOTE — TRANSFER OF CARE
"Anesthesia Transfer of Care Note    Patient: Kaylin Vargas    Procedure(s) Performed: Procedure(s) (LRB):  EGD (ESOPHAGOGASTRODUODENOSCOPY) (N/A)    Anesthesia PACU Handoff    Last vitals:   Visit Vitals  BP (!) 147/86 (BP Location: Right arm, Patient Position: Lying)   Pulse (P) 100   Temp (P) 36.2 °C (97.1 °F) (Skin)   Resp (P) 14   Ht 5' 6" (1.676 m)   Wt 114.3 kg (252 lb)   LMP 04/28/2020   SpO2 (P) 100%   Breastfeeding? No   BMI 40.67 kg/m²     "

## 2020-05-11 NOTE — BRIEF OP NOTE
Discharge Note  Short Stay      SUMMARY     Admit Date: 5/11/2020    Attending Physician: Ramiro Trejo Jr., MD     Discharge Physician: Ramiro Trejo Jr., MD    Discharge Date: 5/11/2020 2:00 PM    Final Diagnosis: Epigastric pain [R10.13]  Chest pain, unspecified type [R07.9]    Impression:  - Normal oropharynx.                       - Normal upper third of esophagus and middle third                        of esophagus.                       - Reflux esophagitis. Biopsied.                       - (The history and examination was suspicious for an                        esophageal spasm).                       - Z-line regular, 35 cm from the incisors.                       - Patulous lower esophageal sphincter.                       - (Non-erosive esophageal reflux (NERD) disease).                       - Normal gastric fundus and gastric body.                       - Minimal antritis. Biopsied.                       - Normal pylorus.                       - Normal examined duodenum.  Recommendation:      - Discharge patient to home.                       - Await pathology and CLOtest results.                       - Follow an antireflux regimen.                       - Continue present medications.                       - Use Prilosec (omeprazole) 40 mg PO daily.                       - Add Pepcid (famotidine) 40 mg PO nightly.                       - Use sucralfate tablets 1 gram PO QID.                       - Use Levsin, NuLev (hyoscyamine) 0.125 mg 1-2 tabs                        SL q 4 hours PRN.                       - Call the G.I. clinic in 2 weeks for reports (if                        you haven't heard from us sooner) [phone].                       - Return to GI clinic in 4-6 weeks.  Ramiro Trejo MD  5/11/2020     Disposition: HOME OR SELF CARE    Patient Instructions:   Current Discharge Medication List      START taking these medications    Details   famotidine (PEPCID) 40 MG tablet  Take 1 tablet (40 mg total) by mouth every evening.  Qty: 60 tablet, Refills: 5      hyoscyamine (LEVSIN/SL) 0.125 mg Subl Place 1 tablet (0.125 mg total) under the tongue every 4 (four) hours as needed. 1 or 2, SL, q 3-4 hrs, to relieve esophagus spasm.  Qty: 20 tablet, Refills: 11      sucralfate (CARAFATE) 1 gram tablet Take 1 tablet (1 g total) by mouth 4 (four) times daily before meals and nightly.  Qty: 120 tablet, Refills: 11         CONTINUE these medications which have CHANGED    Details   omeprazole (PRILOSEC) 40 MG capsule Take 1 capsule (40 mg total) by mouth once daily.  Qty: 90 capsule, Refills: 3    Associated Diagnoses: Gastroesophageal reflux disease without esophagitis         CONTINUE these medications which have NOT CHANGED    Details   aluminum-magnesium hydroxide-simethicone (MAALOX) 200-200-20 mg/5 mL Susp Take 30 mLs by mouth 3 (three) times daily as needed.  Qty: 354 mL, Refills: 1      chlorproMAZINE (THORAZINE) 25 MG tablet Take 1 tablet (25 mg total) by mouth 2 (two) times daily. for 14 days  Qty: 28 tablet, Refills: 0    Associated Diagnoses: Gastritis, presence of bleeding unspecified, unspecified chronicity, unspecified gastritis type; Belching      esomeprazole (NEXIUM) 20 MG capsule Take 20 mg by mouth before breakfast.      mometasone (NASONEX) 50 mcg/actuation nasal spray Instill 2 sprays in each nostril every day until directed to stop.  Take only as needed.  Qty: 17 g, Refills: 0      traZODone (DESYREL) 50 MG tablet Take 1 tablet (50 mg total) by mouth nightly as needed for Insomnia.  Qty: 30 tablet, Refills: 1    Associated Diagnoses: Adjustment insomnia; Anxiety in acute stress reaction             Discharge Procedure Orders (must include Diet, Follow-up, Activity)    Follow Up:  Follow up with PCP as per your routine.  Please follow an anti reflux diet.  Activity as tolerated.    No driving day of procedure.

## 2020-05-11 NOTE — DISCHARGE INSTRUCTIONS
Recovery After Procedural Sedation (Adult)  You have been given medicine by vein to make you sleep during your surgery. This may have included both a pain medicine and sleeping medicine. Most of the effects have worn off. But you may still have some drowsiness for the next 6 to 8 hours.  Home care  Follow these guidelines when you get home:  · For the next 8 hours, you should be watched by a responsible adult. This person should make sure your condition is not getting worse.  · Don't drink any alcohol for the next 24 hours.  · Don't drive, operate dangerous machinery, or make important business or personal decisions during the next 24 hours.  Note: Your healthcare provider may tell you not to take any medicine by mouth for pain or sleep in the next 4 hours. These medicines may react with the medicines you were given in the hospital. This could cause a much stronger response than usual.  Follow-up care  Follow up with your healthcare provider if you are not alert and back to your usual level of activity within 12 hours.  When to seek medical advice  Call your healthcare provider right away if any of these occur:  · Drowsiness gets worse  · Weakness or dizziness gets worse  · Repeated vomiting  · You can't be awakened   Date Last Reviewed: 10/18/2016  © 3176-5017 [employer]. [address] [place] [address], [place] [address]. All rights reserved. This information is not intended as a substitute for professional medical care. Always follow your healthcare professional's instructions.        Tips to Control Acid Reflux    To control acid reflux, youll need to make some basic diet and lifestyle changes. The simple steps outlined below may be all youll need to ease discomfort.  Watch what you eat  · Avoid fatty foods and spicy foods.  · Eat fewer acidic foods, such as citrus and tomato-based foods. These can increase symptoms.  · Limit drinking alcohol, caffeine, and fizzy beverages. All increase acid  reflux.  · Try limiting chocolate, peppermint, and spearmint. These can worsen acid reflux in some people.  Watch when you eat  · Avoid lying down for 3 hours after eating.  · Do not snack before going to bed.  Raise your head  Raising your head and upper body by 4 to 6 inches helps limit reflux when youre lying down. Put blocks under the head of your bed frame to raise it.  Other changes  · Lose weight, if you need to  · Dont exercise near bedtime  · Avoid tight-fitting clothes  · Limit aspirin and ibuprofen  · Stop smoking   Date Last Reviewed: 7/1/2016  © 2694-7120 [employer]. [address] [place] [address], [place] [address]. All rights reserved. This information is not intended as a substitute for professional medical care. Always follow your healthcare professional's instructions.

## 2020-05-11 NOTE — [employer] PATIENT INSTRUCTIONS
Discharge Summary/Instructions after an Endoscopic Procedure  Patient Name: Kaylin Vargas  Patient MRN: [id number]  Patient   YOB: 1985  Monday, May 11, 2020  Ramiro Trejo MD  RESTRICTIONS:  During your procedure today, you received medications for sedation.  These   medications may affect your judgment, balance and coordination.  Therefore,   for 24 hours, you have the following restrictions:   - DO NOT drive a car, operate machinery, make legal/financial decisions,   sign important papers or drink alcohol.    ACTIVITY:  Today: no heavy lifting, straining or running due to procedural   sedation/anesthesia.  The following day: return to full activity including work.  DIET:  Eat and drink normally unless instructed otherwise.     TREATMENT FOR COMMON SIDE EFFECTS:  - Mild abdominal pain, nausea, belching, bloating or excessive gas:  rest,   eat lightly and use a heating pad.  - Sore Throat: treat with throat lozenges and/or gargle with warm salt   water.  - Because air was used during the procedure, expelling large amounts of air   from your rectum or belching is normal.  - If a bowel prep was taken, you may not have a bowel movement for 1-3 days.    This is normal.  SYMPTOMS TO WATCH FOR AND REPORT TO YOUR PHYSICIAN:  1. Abdominal pain or bloating, other than gas cramps.  2. Chest pain.  3. Back pain.  4. Signs of infection such as: chills or fever occurring within 24 hours   after the procedure.  5. Rectal bleeding, which would show as bright red, maroon, or black stools.   (A tablespoon of blood from the rectum is not serious, especially if   hemorrhoids are present.)  6. Vomiting.  7. Weakness or dizziness.  GO DIRECTLY TO THE NEAREST EMERGENCY ROOM IF YOU HAVE ANY OF THE FOLLOWING:      Difficulty breathing              Chills and/or fever over 101 F   Persistent vomiting and/or vomiting blood   Severe abdominal pain   Severe chest pain   Black, tarry stools   Bleeding- more than one  tablespoon   Any other symptom or condition that you feel may need urgent attention  Your doctor recommends these additional instructions:  If any biopsies were taken, your doctors clinic will contact you in 1 to 2   weeks with any results.  Follow an antireflux regimen.  This includes:       - Do not lie down for at least 3 to 4 hours after meals.        - Raise the head of the bed 4 to 6 inches.        - Decrease excess weight.        - Avoid citrus juices and other acidic foods, alcohol, chocolate, mints,   coffee and other caffeinated beverages, carbonated beverages, fatty and   fried foods.        - Avoid tight-fitting clothing.        - Avoid cigarettes and other tobacco products.   Continue your present medications.   Take Prilosec (omeprazole) 40 mg by mouth once a day.   Take Pepcid (famotidine) 40 mg by mouth every night.   Take Carafate (sucralfate) tablets 1 gram by mouth four times a day.   Take Levsin, NuLev (hyoscyamine) 0.125 mg 1-2 tablets under your tongue   every four hours as needed, to relieve esophagus spasm.   Return to GI clinic in 4-6 weeks.  For questions, problems or results please call your physician - Ramiro Trejo MD at Work:  [phone].  EMERGENCY PHONE NUMBER: [phone], LAB RESULTS: [phone]  IF A COMPLICATION OR EMERGENCY SITUATION ARISES AND YOU ARE UNABLE TO REACH   YOUR PHYSICIAN - GO DIRECTLY TO THE EMERGENCY ROOM.  ___________________________________________  Nurse Signature  ___________________________________________  Patient/Designated Responsible Party Signature  Ramiro Trejo MD  5/11/2020 1:53:56 PM  This report has been verified and signed electronically.  [employer]

## 2020-05-11 NOTE — ANESTHESIA PREPROCEDURE EVALUATION
05/11/2020  Kaylin Vargas is a 35 y.o., female.    Anesthesia Evaluation    I have reviewed the Patient Summary Reports.    I have reviewed the Nursing Notes.   I have reviewed the Medications.     Review of Systems  Cardiovascular:  Cardiovascular Normal     Pulmonary:  Pulmonary Normal Covid 19 positive April22;.  F/U two negative tests prior to today.   Renal/:  Renal/ Normal     Hepatic/GI:   GERD, poorly controlled    Musculoskeletal:  Musculoskeletal Normal    Neurological:  Neurology Normal    Psych:   Psychiatric History anxiety          Physical Exam  General:  Morbid Obesity    Airway/Jaw/Neck:  Airway Findings: Mouth Opening: Normal Tongue: Normal  General Airway Assessment: Adult  Mallampati: III  Improves to II with phonation.      Dental:  Dental Findings: In tact   Chest/Lungs:  Chest/Lungs Findings: Clear to auscultation, Normal Respiratory Rate         Mental Status:  Mental Status Findings:  Cooperative, Alert and Oriented, Anxious         Anesthesia Plan  Type of Anesthesia, risks & benefits discussed:  Anesthesia Type:  general  Patient's Preference:   Intra-op Monitoring Plan: standard ASA monitors  Intra-op Monitoring Plan Comments:   Post Op Pain Control Plan:   Post Op Pain Control Plan Comments:   Induction:   IV  Beta Blocker:  Patient is not currently on a Beta-Blocker (No further documentation required).       Informed Consent: Patient understands risks and agrees with Anesthesia plan.  Questions answered. Anesthesia consent signed with patient.  ASA Score: 2     Day of Surgery Review of History & Physical:            Ready For Surgery From Anesthesia Perspective.

## 2020-05-11 NOTE — H&P
"History & Physical - Short Stay  Gastroenterology      SUBJECTIVE:     Procedure: Gastroscopy    Chief Complaint/Indication for Procedure:  Epigastric/RUQ pain    History of Present Illness:  Office Visit     5/4/2020  [facility]      Ramiro Trejo Jr., MD   Gastroenterology   Epigastric pain +5 more   Dx   Abdominal Pain   , Gastroesophageal Reflux , Chest Pain ; Referred by Rima Madrigal MD   Reason for Visit    Progress Notes        Subjective:       Patient ID: Kaylin Vargas is a 35 y.o. female.     Chief Complaint: Abdominal Pain (upper); Gastroesophageal Reflux; and Chest Pain     This is my first encounter with this pleasant 36 y/o F, who has had 3 recent ER visits for her GI c/o's.     She begins by reporting the epigastric pain, and a burning in her chest.  And the pressure in her throat.  Sometimes it feels like she can't swallow.   And yet, she feels "super hungry."  See the weight chart below, has lost ~ 20 lbs.     FHx - Her daughter (now 18) had issues with her "digestive system" about 13-15 y/o.  She was rx'd something by the pediatrician, and that resolved.               A sister has been dx'd with Crohn's (but doesn't know the severity or extent).             No FHx of GB disease that she knows of.      She had recently been on abx (amoxicillin) for sinuses.  Review of her old meds shows she has been on doxycycline in the past and tolerated it well, as far as she can recall.        The first time (4/24/2020), she developed (rather sudden?) chest tightness while at work.  She was rec'd Pepcid and Maalox.  She f/u'd with PCP, and was rx'd omeprazole.  Then the second ER visit (below), when the GI Cons was advised..     First ER visit, 4/24/2020:             Abdominal Pain                          Patient reports "burning" abdominal pain x two days. Patient reports taking pepto-bismol without relief.    Ms Vargas is a 35yoF Who presents for intermittent " epigastric discomfort x several days; pertinent PMHx obesity.  Patient reports onset of intermittent epigastric discomfort described as burning with radiation into chest over the past few days.  Not necessarily made worse postprandial.  Associated with increased belching and mild nausea.  Patient has tried Pepto-Bismol with chest temporarily relieved symptoms, though they return.  Denies associated fever/chills, chest pain, shortness of breath, cough, constipation.  No abdominal surgical history.  The patients available PMH, PSH, Social History, medications, allergies, and triage vital signs were reviewed just prior to their medical evaluation.  A ten point review of systems was completed and is negative except as documented above.  Patient denies any other acute medical complaint.  Clinical Impression:   1.         Abdominal discomfort, epigastric       R10.13 789.06  2.         Chest pain       R07.9   786.50              Discharge        Stable   ED Prescriptions               Medication                     famotidine (PEPCID) 20 MG tablet    Take 1 tablet (20 mg total) by mouth once daily. for 20 doses                    aluminum-magnesium hydroxide-simethicone (MAALOX) 200-200-20 mg/5 mL Susp   Take 30 mLs by mouth 3 (three) times daily as needed.            PCP OV Note, 4/28/2020  [facility] - Primary care   Imelda Albarado MD   Chief Complaint:  Epigastric discomfort  Notes: 36 yo females went to the emergency room on 4/24/2020 for intermittent epigastric discomfort described as burning with radiation into chest. She reports excess burping, belching and twisting/ spasms.  No association foods.  Relief with Pepto-Bismol and Pepcid although she still continues to have symptoms. Her cardiac workup was negative.   Assessment:    1.         Gastroesophageal reflux disease without esophagitis     Plan:    1. Gastroesophageal reflux disease without esophagitis  -     omeprazole (PRILOSEC) 40 MG capsule;  "Take 1 capsule (40 mg total) by mouth once daily.  Dispense: 90 capsule; Refill: 0  Stop  Famotidine.  Will give a trial of omeprazole.  If symptoms persist follow-up with Gastroenterology.  -     Ambulatory referral/consult to Gastroenterology; Future; Expected date: 05/05/2020        Second ER visit, 4/30/2020:             Chest Pain                          was seen here last week for the same thing, still having the pain   7:18 PM   Patient is a 35-year-old female who presents the ED with chest pain.  States that her symptoms today are similar to the symptoms she had 6 days ago when she presented to the ED for similar complaints.  She had a negative lipase and troponin as well as CBC and CMP at the time.  Patient works here at [employer] in [employer].  She states that she has been very stressed out.  States that her symptoms have been persistent all day long and somewhat worse after eating.  States that she had jennifer for lunch which made her symptoms "bad".  She is complaining of 6/10 pressure that goes up and down from her epigastric region to her throat.  She "felt something coming back up" constantly.  Feels a "bulge" in her throat.  Has been compliant with Prilosec for the past 6 days and Pepto-Bismol p.r.n.  She denies any new symptoms in the past 6 days.  Admits that her symptoms have not changed, but is concerned and wanted to get evaluated again.   She tested positive for COVID-19 in March, but reports resolution of nasal congestion, cough, and shortness of breath.  She tested positive for antibodies 8 days ago.  Patient updated with results.  She reports great improvement in her symptoms after GI cocktail.  Given history, physical exam, and EKG and chest x-ray today, I doubt any life-threatening or emergent causes.  Her symptoms are most likely related to acid reflux versus gastritis.  I discussed etiology.  She is to continue Prilosec on a daily basis for 4 weeks total.  I recommended OTC antacid " "such as Maalox or Mylanta.  We discussed lifestyle modifications and other tips to improve acid reflux.  She has a appointment with gastroenterology in July and she should call and schedule a sooner appointment given that this is her 2nd ED visit.  Return to ED precautions given for signs and symptoms as discussed, and she voices her understanding.  All questions were answered.  Patient comfortable with plan and stable for discharge.        Kaylin Vargas   to Ana Luisa Ramos MA   5/2/20 9:55 AM   Good Morning,   I'm having issues with swallowing foods   and burning in the chest , a great amount of gas .   I'm not able to eat a alot the past couple of days I've loss a great deal of weight that's concerning.   The feeling of food in my throat.   And bad post nasal drip....   Thanks         And the third ER visit, 5/2/2020:  [facility]   Elijah Lewis MD   ED Provider Notes   History             Abdominal Pain                          "burning" pain to epigastric area for 2 weeks.  Burning travels up chest to the throat.  Taking Prilosec and Maalox.  Tightning in throat.  Denies vomiting "but I tried to, to get it to go away.  I burp a little bit at times and it's fine"     35-year-old female presents complaining of upper abdominal pain for 2 weeks.  Patient reports burning pain that travels upper chest to her throat.  Patient is taking Prilosec and Maalox without  Relief.  Patient admits to nausea but denies vomiting.  Patient admits to belching a lot.  Clinical Impression:   1.         Gastritis, presence of bleeding unspecified, unspecified chronicity, unspecified gastritis type   K29.70 535.50  2.         Belching          R14.2   787.3  ED Prescriptions               Medication                     chlorproMAZINE (THORAZINE) 25 MG tablet           Take 1 tablet (25 mg total) by mouth 2 (two) times daily. for 14 days          28 tablet          5/2/2020 5/16/2020        " "Elijah Lewis MD   Follow-up Information               Follow up With            Specialties       Details Why     Contact Info              Craig Mcnulty MD    Gastroenterology        Schedule an appointment as soon as possible for a visit             For gastric workup        [address]  [address]  [facility] [address] [phone]           Wt Readings from Last 15 Encounters:  05/04/20 : 95.8 kg (211 lb 3.2 oz)  05/02/20 : 95.8 kg (211 lb 3.2 oz)  04/30/20 : 97.5 kg (215 lb)  04/24/20 : 104.3 kg (230 lb)  02/28/20 : 105.2 kg (231 lb 14.8 oz)  10/10/19 : 105.6 kg (232 lb 12.9 oz)  05/26/19 : 102.1 kg (225 lb)  09/26/18 : 104.2 kg (229 lb 11.5 oz)  07/26/18 : 104 kg (229 lb 4.8 oz)  04/26/18 : 104.6 kg (230 lb 9.6 oz)  02/21/18 : 95.3 kg (210 lb)  07/24/17 : 102 kg (224 lb 13.9 oz)  02/22/16 : 101 kg (222 lb 11.2 oz)  01/25/16 : 100.7 kg (222 lb 0.1 oz)  08/16/13 : 98 kg (216 lb)           Results for VERA SIMPSON (MRN [id number])  4/22/2020 13:51  COVID-19 (SARS CoV-2) IgG Ab: Positive (A)        Reviewed recent CXR:  No evidence of a hiatal hernia.                See ED visit 5/7/2020:  ED  5/7/2020 (2 hours)  [facility]   Last attending  Treatment team   Gastroesophageal reflux disease, esophagitis presence not specified +1 more   Clinical impression   Chest Pain   ; Referred by Aaareferral Self   Chief complaint    ED Provider Notes        Encounter Date: 5/7/2020        History           Chief Complaint   Patient presents with    Chest Pain       Pt states " I have gerd and I think it's related".  Pt statses that she has EGD scheduled for Monday, but "just wants it checked out".  Pain increases with deep breath.      34 yo F with GERD and anxiety that presents with chest pain.     Has been experiencing sub-acute chest discomfort since 4/24. Describes discomfort as feeling of heaviness that typically occurs postprandially. Patient has had " reflux since 4/24. Has tried prilosec with no improvement and nexium with moderate improvement. Was seen for similar symptoms last week, received GI cocktail with no relief. Is currently being worked up by GI as outpatient and scheduled for EGD on Monday. At last GI visit, she was recommended to increase PPI to BID but patient has only been taking it PRN. Today, she had a sharp retrosternal pain that was different from her typical reflux-related pain. Describes pain as sharp, nonradiating and worse with movement. Doesn't worsen with inspiration. However, since pain was different than her typical pain, she presented to ED. Denies tobacco use, OCP use, PMHx or FHx of blood clots.        -: The patient presents with episodic chest pain that is sharp and associated with inspiration and movement.  She does have a history of GERD that feels somewhat different.  Pain lasts a second or two then goes away.  It is not associated with exertion.  Her electrocardiogram is  normal.  I highly doubt ischemia.  Her troponin is normal. Pain free in ED Plan discharge.                PTA Medications   Medication Sig    aluminum-magnesium hydroxide-simethicone (MAALOX) 200-200-20 mg/5 mL Susp Take 30 mLs by mouth 3 (three) times daily as needed.    chlorproMAZINE (THORAZINE) 25 MG tablet Take 1 tablet (25 mg total) by mouth 2 (two) times daily. for 14 days    esomeprazole (NEXIUM) 20 MG capsule Take 20 mg by mouth before breakfast.    mometasone (NASONEX) 50 mcg/actuation nasal spray Instill 2 sprays in each nostril every day until directed to stop.  Take only as needed.    omeprazole (PRILOSEC) 40 MG capsule Take 1 capsule (40 mg total) by mouth once daily.    traZODone (DESYREL) 50 MG tablet Take 1 tablet (50 mg total) by mouth nightly as needed for Insomnia.       Review of patient's allergies indicates:  No Known Allergies     Past Medical History:   Diagnosis Date    Acne     Anxiety disorder, unspecified 4/30/2020     "Gastro-esophageal reflux disease without esophagitis 4/30/2020    Obesity      History reviewed. No pertinent surgical history.  Family History   Problem Relation Age of Onset    Diabetes Father     Hypertension Father     Hypertension Mother     Kidney disease Mother     Heart disease Mother     Crohn's disease Sister     Aneurysm Maternal Grandmother     Heart disease Maternal Grandfather     Heart disease Paternal Grandmother     Breast cancer Neg Hx     Colon cancer Neg Hx     Ovarian cancer Neg Hx     Melanoma Neg Hx      Social History     Tobacco Use    Smoking status: Never Smoker    Smokeless tobacco: Never Used   Substance Use Topics    Alcohol use: Yes     Alcohol/week: 2.0 standard drinks     Types: 2 Shots of liquor per week     Frequency: Monthly or less     Drinks per session: 1 or 2     Binge frequency: Monthly     Comment: Once a week    Drug use: No         OBJECTIVE:     Vital Signs (Most Recent)  Pulse: 90 (05/11/20 1154)  Resp: 18 (05/11/20 1154)  BP: (!) 147/86 (05/11/20 1154)  SpO2: 100 % (05/11/20 1154)    Physical Exam:  :Ht 5' 6" (1.676 m)   Wt 95.8 kg (211 lb 3.2 oz)    BMI 34.09 kg/m²                                                           GENERAL:  Comfortable, in no acute distress.                                 HEENT EXAM:  Nonicteric.  No adenopathy.  Oropharynx is clear.               NECK:  Supple.                                                               LUNGS:  Clear.                                                               CARDIAC:  Regular rate and rhythm.  S1, S2.  No murmur.                      ABDOMEN:  Obese.  Soft. Bowel sounds are normal. She exhibits no distension and no mass. There is tenderness (Epig area / and to R of midline.). There is no guarding.                                         EXTREMITIES:  No edema.     MENTAL STATUS:  Alert and oriented.    ASSESSMENT/PLAN:     Assessment: Epigastric/RUQ pain    Plan: " Gastroscopy    Anesthesia Plan:   MAC / General Anaesthesia    ASA Grade: ASA 2 - Patient with mild systemic disease with no functional limitations    MALLAMPATI SCORE: II (hard and soft palate, upper portion of tonsils anduvula visible)

## 2020-05-14 NOTE — PROGRESS NOTES
Subjective:       Patient ID: Kaylin Vargas is a 35 y.o. female.    Chief Complaint: Follow-up    Pt of Dr. Madrigal, here for anxiety and a follow-up about GI issues    I last saw her on 5-5-20 for some of the same issues, see note. Sent disability papers to disability department for time off.    At the time she already had an EGD scheduled on 5-11-20 which was done, she has an appt in 2 days on 5/18 with GI, see results below:    Impression:          - Normal oropharynx.                       - Normal upper third of esophagus and middle third                        of esophagus.                       - Reflux esophagitis. Biopsied.                       - (The history and examination was suspicious for an                        esophageal spasm).                       - Z-line regular, 35 cm from the incisors.                       - Patulous lower esophageal sphincter.                       - (Non-erosive esophageal reflux (NERD) disease).                       - Normal gastric fundus and gastric body.                       - Minimal antritis. Biopsied.                       - Normal pylorus.                       - Normal examined duodenum.  Recommendation:      - Discharge patient to home.                       - Await pathology and CLOtest results.                       - Follow an antireflux regimen.                       - Continue present medications.                       - Use Prilosec (omeprazole) 40 mg PO daily.                       - Add Pepcid (famotidine) 40 mg PO nightly.                       - Use sucralfate tablets 1 gram PO QID.                       - Use Levsin, NuLev (hyoscyamine) 0.125 mg 1-2 tabs                        SL q 4 hours PRN.                       - Call the G.I. clinic in 2 weeks for reports (if                        you haven't heard from us sooner) [phone].                       - Return to GI clinic in 4-6 weeks.    Pt is actually stating she is doing a little  better today. Today she was able to eat without issues. She has actually gained 6 pounds since her visit with me on 5-5-20. She still is concerned about burning in her chest with a feeling of something in her throat, although her EGD showed nothing abnormal. She was given Levsin for esophageal spasms. She plans to keep f/u on Monday with GI.    States she also has postnasal drip which we touched on briefly last visit which she is wondering if this is compounding the throat issue. We discussed possible trying Flonase nasal spray, she has not tried yet    States the trazodone is helping her in terms of anxiety and sleep.    Review of Systems   Constitutional: Positive for activity change and unexpected weight change. Negative for appetite change.        This has improved since last visit   HENT: Positive for postnasal drip. Negative for congestion, hearing loss, rhinorrhea, sinus pressure, sinus pain, sneezing, sore throat and trouble swallowing.         As documented in HPI     Eyes: Negative for discharge and visual disturbance.   Respiratory: Negative for chest tightness and wheezing.    Cardiovascular: Negative for chest pain and palpitations.   Gastrointestinal: Negative for blood in stool, constipation, diarrhea and vomiting.   Endocrine: Negative for polydipsia and polyuria.   Genitourinary: Negative for difficulty urinating, dysuria, hematuria and menstrual problem.   Musculoskeletal: Negative for arthralgias, joint swelling and neck pain.   Neurological: Negative for weakness and headaches.   Psychiatric/Behavioral: Positive for dysphoric mood. Negative for confusion.        Improved trazodone         Review of patient's allergies indicates:  No Known Allergies    Current Outpatient Medications:     aluminum-magnesium hydroxide-simethicone (MAALOX) 200-200-20 mg/5 mL Susp, Take 30 mLs by mouth 3 (three) times daily as needed., Disp: 354 mL, Rfl: 1    chlorproMAZINE (THORAZINE) 25 MG tablet, Take 1 tablet  (25 mg total) by mouth 2 (two) times daily. for 14 days, Disp: 28 tablet, Rfl: 0    esomeprazole (NEXIUM) 20 MG capsule, Take 20 mg by mouth before breakfast., Disp: , Rfl:     famotidine (PEPCID) 40 MG tablet, Take 1 tablet (40 mg total) by mouth every evening., Disp: 60 tablet, Rfl: 5    hyoscyamine (LEVSIN/SL) 0.125 mg Subl, Place 1 tablet (0.125 mg total) under the tongue every 4 (four) hours as needed. 1 or 2, SL, q 3-4 hrs, to relieve esophagus spasm., Disp: 20 tablet, Rfl: 11    mometasone (NASONEX) 50 mcg/actuation nasal spray, Instill 2 sprays in each nostril every day until directed to stop.  Take only as needed., Disp: 17 g, Rfl: 0    omeprazole (PRILOSEC) 40 MG capsule, Take 1 capsule (40 mg total) by mouth once daily., Disp: 90 capsule, Rfl: 3    sucralfate (CARAFATE) 1 gram tablet, Take 1 tablet (1 g total) by mouth 4 (four) times daily before meals and nightly., Disp: 120 tablet, Rfl: 11    traZODone (DESYREL) 50 MG tablet, Take 1 tablet (50 mg total) by mouth nightly as needed for Insomnia., Disp: 30 tablet, Rfl: 1    Patient Active Problem List   Diagnosis    Obesity (BMI 35.0-39.9 without comorbidity)    BMI 38.0-38.9,adult    Suspected sleep apnea    Acute viral syndrome    Contact w and exposure to oth viral communicable diseases    Gastro-esophageal reflux disease without esophagitis    Anxiety disorder, unspecified    Allergic rhinitis, unspecified    Epigastric pain     Past Medical History:   Diagnosis Date    Acne     Anxiety disorder, unspecified 4/30/2020    Gastro-esophageal reflux disease without esophagitis 4/30/2020    Obesity      Past Surgical History:   Procedure Laterality Date    ESOPHAGOGASTRODUODENOSCOPY N/A 5/11/2020    Procedure: EGD (ESOPHAGOGASTRODUODENOSCOPY);  Surgeon: Ramiro Trejo Jr., MD;  Location: [facility];  Service: Endoscopy;  Laterality: N/A;     Social History     Socioeconomic History    Marital status: Single     Spouse name: Not on  "file    Number of children: 2    Years of education: Not on file    Highest education level: Not on file   Occupational History    Not on file   Social Needs    Financial resource strain: Not hard at all    Food insecurity:     Worry: Never true     Inability: Never true    Transportation needs:     Medical: No     Non-medical: No   Tobacco Use    Smoking status: Never Smoker    Smokeless tobacco: Never Used   Substance and Sexual Activity    Alcohol use: Yes     Alcohol/week: 2.0 standard drinks     Types: 2 Shots of liquor per week     Frequency: Monthly or less     Drinks per session: 1 or 2     Binge frequency: Monthly     Comment: Once a week    Drug use: No    Sexual activity: Yes     Partners: Male   Lifestyle    Physical activity:     Days per week: 5 days     Minutes per session: 30 min    Stress: Very much   Relationships    Social connections:     Talks on phone: Once a week     Gets together: Never     Attends Taoist service: Not on file     Active member of club or organization: Yes     Attends meetings of clubs or organizations: More than 4 times per year     Relationship status:    Other Topics Concern    Are you pregnant or think you may be? Not Asked    Breast-feeding Not Asked   Social History Narrative    . Adolescent children.      Family History   Problem Relation Age of Onset    Diabetes Father     Hypertension Father     Hypertension Mother     Kidney disease Mother     Heart disease Mother     Crohn's disease Sister     Aneurysm Maternal Grandmother     Heart disease Maternal Grandfather     Heart disease Paternal Grandmother     Breast cancer Neg Hx     Colon cancer Neg Hx     Ovarian cancer Neg Hx     Melanoma Neg Hx        Objective:       Vitals:    05/16/20 1117   BP: 122/74   Pulse: (!) 120   Temp: 99.7 °F (37.6 °C)   SpO2: 99%   Weight: 96.7 kg (213 lb 3 oz)   Height: 5' 6" (1.676 m)   PainSc:   4     Body mass index is 34.41 " kg/m².    Physical Exam   Constitutional: She is oriented to person, place, and time. She appears well-developed and well-nourished.   obese   HENT:   Head: Normocephalic.   Mouth/Throat: Uvula is midline, oropharynx is clear and moist and mucous membranes are normal. No oropharyngeal exudate, posterior oropharyngeal edema, posterior oropharyngeal erythema or tonsillar abscesses.   Clear PND noted on exam     Eyes: Pupils are equal, round, and reactive to light. Conjunctivae and EOM are normal.   Neck: Normal range of motion. Neck supple. No JVD present. No tracheal deviation present. No thyromegaly present.   Cardiovascular: Regular rhythm, normal heart sounds and intact distal pulses. Exam reveals no gallop and no friction rub.   No murmur heard.  Pulmonary/Chest: Effort normal and breath sounds normal.   Abdominal: Soft. Bowel sounds are normal. She exhibits no distension. There is no tenderness.   obese   Musculoskeletal: Normal range of motion.   Lymphadenopathy:     She has no cervical adenopathy.   Neurological: She is alert and oriented to person, place, and time.   Skin: Skin is warm and dry. Capillary refill takes less than 2 seconds.   Psychiatric: She has a normal mood and affect. Her behavior is normal. Judgment and thought content normal.   Nursing note and vitals reviewed.      Assessment:       1. Gastro-esophageal reflux disease without esophagitis    2. Postnasal drip    3. BMI 34.0-34.9,adult    4. Obesity (BMI 30-39.9)        Plan:       Kaylin Larson was seen today for follow-up.    Diagnoses and all orders for this visit:    Gastro-esophageal reflux disease without esophagitis    Postnasal drip  -     fluticasone propionate (FLONASE) 50 mcg/actuation nasal spray; 1 spray (50 mcg total) by Each Nostril route 2 (two) times daily.  -     levocetirizine (XYZAL) 5 MG tablet; Take 1 tablet (5 mg total) by mouth every evening.    BMI 34.0-34.9,adult  BMI reviewed    Obesity (BMI 30-39.9)  BMI  reviewed.    Diet and exercise to lose weight.    Continue meds per GI, keep appt on Monday as scheduled    Rest and Fluids, Tylenol or Motrin otc as needed for pain and or fever    Allergen avoidance discussed, humidified air recommended    Warm salt water gargles as needed for throat pain    Nasal spray, taught how to correctly use--Flonase 1 spray each nostril twice a day    Xyzal 5mg at bedtime for sinus drip    Follow up if symptoms worsen or fail to improve.

## 2020-05-15 LAB
FINAL PATHOLOGIC DIAGNOSIS: NORMAL
GROSS: NORMAL
SUPPLEMENTAL DIAGNOSIS: NORMAL

## 2020-05-16 ENCOUNTER — OFFICE VISIT (OUTPATIENT)
Dept: INTERNAL MEDICINE | Facility: CLINIC | Age: 35
End: 2020-05-16
Payer: COMMERCIAL

## 2020-05-16 VITALS
TEMPERATURE: 100 F | OXYGEN SATURATION: 99 % | HEIGHT: 66 IN | SYSTOLIC BLOOD PRESSURE: 122 MMHG | WEIGHT: 213.19 LBS | DIASTOLIC BLOOD PRESSURE: 74 MMHG | HEART RATE: 120 BPM | BODY MASS INDEX: 34.26 KG/M2

## 2020-05-16 DIAGNOSIS — K21.9 GASTRO-ESOPHAGEAL REFLUX DISEASE WITHOUT ESOPHAGITIS: Primary | ICD-10-CM

## 2020-05-16 DIAGNOSIS — R09.82 POSTNASAL DRIP: ICD-10-CM

## 2020-05-16 DIAGNOSIS — E66.9 OBESITY (BMI 30-39.9): ICD-10-CM

## 2020-05-16 PROCEDURE — 99999 PR PBB SHADOW E&M-EST. PATIENT-LVL IV: CPT | Mod: PBBFAC,,, | Performed by: NURSE PRACTITIONER

## 2020-05-16 PROCEDURE — 99214 OFFICE O/P EST MOD 30 MIN: CPT | Mod: S$GLB,,, | Performed by: NURSE PRACTITIONER

## 2020-05-16 PROCEDURE — 99999 PR PBB SHADOW E&M-EST. PATIENT-LVL IV: ICD-10-PCS | Mod: PBBFAC,,, | Performed by: NURSE PRACTITIONER

## 2020-05-16 PROCEDURE — 3008F PR BODY MASS INDEX (BMI) DOCUMENTED: ICD-10-PCS | Mod: CPTII,S$GLB,, | Performed by: NURSE PRACTITIONER

## 2020-05-16 PROCEDURE — 99214 PR OFFICE/OUTPT VISIT, EST, LEVL IV, 30-39 MIN: ICD-10-PCS | Mod: S$GLB,,, | Performed by: NURSE PRACTITIONER

## 2020-05-16 PROCEDURE — 3008F BODY MASS INDEX DOCD: CPT | Mod: CPTII,S$GLB,, | Performed by: NURSE PRACTITIONER

## 2020-05-16 RX ORDER — FLUTICASONE PROPIONATE 50 MCG
1 SPRAY, SUSPENSION (ML) NASAL 2 TIMES DAILY
Qty: 18.2 ML | Refills: 0 | Status: SHIPPED | OUTPATIENT
Start: 2020-05-16 | End: 2020-06-09

## 2020-05-16 RX ORDER — LEVOCETIRIZINE DIHYDROCHLORIDE 5 MG/1
5 TABLET, FILM COATED ORAL NIGHTLY
Qty: 30 TABLET | Refills: 0 | Status: SHIPPED | OUTPATIENT
Start: 2020-05-16 | End: 2020-07-09 | Stop reason: SDUPTHER

## 2020-05-16 NOTE — PATIENT INSTRUCTIONS
Continue meds per GI, keep appt on Monday as scheduled    Rest and Fluids, Tylenol or Motrin otc as needed for pain and or fever    Allergen avoidance discussed, humidified air recommended    Warm salt water gargles as needed for throat pain    Nasal spray, taught how to correctly use--Flonase 1 spray each nostril twice a day    Xyzal 5mg at bedtime for sinus drip

## 2020-05-18 ENCOUNTER — PATIENT MESSAGE (OUTPATIENT)
Dept: INTERNAL MEDICINE | Facility: CLINIC | Age: 35
End: 2020-05-18

## 2020-05-18 ENCOUNTER — OFFICE VISIT (OUTPATIENT)
Dept: GASTROENTEROLOGY | Facility: CLINIC | Age: 35
End: 2020-05-18
Payer: COMMERCIAL

## 2020-05-18 ENCOUNTER — PATIENT OUTREACH (OUTPATIENT)
Dept: ADMINISTRATIVE | Facility: OTHER | Age: 35
End: 2020-05-18

## 2020-05-18 VITALS
DIASTOLIC BLOOD PRESSURE: 85 MMHG | HEART RATE: 90 BPM | SYSTOLIC BLOOD PRESSURE: 124 MMHG | HEIGHT: 66 IN | WEIGHT: 211 LBS | BODY MASS INDEX: 33.91 KG/M2

## 2020-05-18 DIAGNOSIS — K21.00 GASTROESOPHAGEAL REFLUX DISEASE WITH ESOPHAGITIS: Primary | ICD-10-CM

## 2020-05-18 DIAGNOSIS — R10.13 EPIGASTRIC PAIN: ICD-10-CM

## 2020-05-18 PROCEDURE — 3008F PR BODY MASS INDEX (BMI) DOCUMENTED: ICD-10-PCS | Mod: CPTII,S$GLB,, | Performed by: INTERNAL MEDICINE

## 2020-05-18 PROCEDURE — 3008F BODY MASS INDEX DOCD: CPT | Mod: CPTII,S$GLB,, | Performed by: INTERNAL MEDICINE

## 2020-05-18 PROCEDURE — 99213 OFFICE O/P EST LOW 20 MIN: CPT | Mod: S$GLB,,, | Performed by: INTERNAL MEDICINE

## 2020-05-18 PROCEDURE — 99213 PR OFFICE/OUTPT VISIT, EST, LEVL III, 20-29 MIN: ICD-10-PCS | Mod: S$GLB,,, | Performed by: INTERNAL MEDICINE

## 2020-05-18 NOTE — PROGRESS NOTES
Subjective:       Patient ID: Kaylin Vargas is a 35 y.o. female.    Chief Complaint: EGD (Pt states she had an EGD performed on 5/11/2020 and states she still is having trouble with digesting her food. ); Bloated; and Gastroesophageal Reflux (Pt states it started on 4/21/2020 it sent her to the ER and was told she had GERD. Pt states she was given medication it's helping a little but she's still having trouble. )    Gastroesophageal Reflux   She complains of abdominal pain (epigastric pain) and chest pain. She reports no sore throat or no wheezing. This is a new problem. The current episode started 1 to 4 weeks ago. The problem has been gradually improving. The symptoms are aggravated by certain foods. Associated symptoms include weight loss (20 pounds in 1 month due to anorexia). Pertinent negatives include no melena. There are no known risk factors. She has tried a PPI for the symptoms. The treatment provided significant relief.   Patient seeking a second opinion. She feels better on current regimen.   She reports that she had Presumptive Covid in March but was not tested. She recovered form the disease and now tested Ab positive.  Her weight loss began with the symptoms of Covid.    Review of Systems   Constitutional: Positive for weight loss (20 pounds in 1 month due to anorexia). Negative for appetite change and fever.   HENT: Negative for sore throat and trouble swallowing.    Eyes: Negative for photophobia and visual disturbance.   Respiratory: Negative for wheezing.    Cardiovascular: Positive for chest pain. Negative for palpitations.   Gastrointestinal: Positive for abdominal pain (epigastric pain). Negative for melena.        See HPI for details   Musculoskeletal: Negative for arthralgias, joint swelling and neck pain.   Skin: Negative for rash and wound.   Neurological: Negative for dizziness, tremors and weakness.   Psychiatric/Behavioral: Negative for behavioral problems and suicidal ideas.        Objective:      Physical Exam   Eyes: Pupils are equal, round, and reactive to light.   Neck: No thyromegaly present.   Cardiovascular: Normal rate, regular rhythm and normal heart sounds.   Pulmonary/Chest: Effort normal and breath sounds normal.   Abdominal: Soft. She exhibits no mass. There is no tenderness. There is no rebound and no guarding.   Musculoskeletal: She exhibits no tenderness.   Psychiatric: Her behavior is normal. Thought content normal.       Assessment:       1. Gastroesophageal reflux disease with esophagitis    2. Epigastric pain        Plan:       Kaylin Larson was seen today for egd, bloated and gastroesophageal reflux.    Diagnoses and all orders for this visit:    Gastroesophageal reflux disease with esophagitis    Epigastric pain      Continue current treatment plan.  Avoid NSAIDs    If symptoms persists consider gallbladder US (doubt GB pathology)    RTC prn

## 2020-05-18 NOTE — PATIENT INSTRUCTIONS
Tips to Control Acid Reflux    To control acid reflux, youll need to make some basic diet and lifestyle changes. The simple steps outlined below may be all youll need to ease discomfort.  Watch what you eat  · Avoid fatty foods and spicy foods.  · Eat fewer acidic foods, such as citrus and tomato-based foods. These can increase symptoms.  · Limit drinking alcohol, caffeine, and fizzy beverages. All increase acid reflux.  · Try limiting chocolate, peppermint, and spearmint. These can worsen acid reflux in some people.  Watch when you eat  · Avoid lying down for 3 hours after eating.  · Do not snack before going to bed.  Raise your head  Raising your head and upper body by 4 to 6 inches helps limit reflux when youre lying down. Put blocks under the head of your bed frame to raise it.  Other changes  · Lose weight, if you need to  · Dont exercise near bedtime  · Avoid tight-fitting clothes  · Limit aspirin and ibuprofen  · Stop smoking   Date Last Reviewed: 7/1/2016  © 7708-2971 [employer]. [address] [place] [address], [place] [address]. All rights reserved. This information is not intended as a substitute for professional medical care. Always follow your healthcare professional's instructions.         [General Appearance - Well Developed] : well developed [Well Groomed] : well groomed [General Appearance - Well Nourished] : well nourished [General Appearance - In No Acute Distress] : no acute distress [Normal Conjunctiva] : the conjunctiva exhibited no abnormalities [Eyelids - No Xanthelasma] : the eyelids demonstrated no xanthelasmas [No Oral Pallor] : no oral pallor [No Oral Cyanosis] : no oral cyanosis [Respiration, Rhythm And Depth] : normal respiratory rhythm and effort [Auscultation Breath Sounds / Voice Sounds] : lungs were clear to auscultation bilaterally [Exaggerated Use Of Accessory Muscles For Inspiration] : no accessory muscle use [Heart Rate And Rhythm] : heart rate and rhythm were normal [Murmurs] : no murmurs present [Heart Sounds] : normal S1 and S2 [Arterial Pulses Normal] : the arterial pulses were normal [Edema] : no peripheral edema present [Bowel Sounds] : normal bowel sounds [Abdomen Soft] : soft [Abdomen Tenderness] : non-tender [Nail Clubbing] : no clubbing of the fingernails [Cyanosis, Localized] : no localized cyanosis [Skin Color & Pigmentation] : normal skin color and pigmentation [No Venous Stasis] : no venous stasis [] : no rash [FreeTextEntry1] : mentally retarded. Anxious. Nonverbal.

## 2020-05-19 ENCOUNTER — PATIENT MESSAGE (OUTPATIENT)
Dept: INTERNAL MEDICINE | Facility: CLINIC | Age: 35
End: 2020-05-19

## 2020-05-20 ENCOUNTER — PATIENT MESSAGE (OUTPATIENT)
Dept: INTERNAL MEDICINE | Facility: CLINIC | Age: 35
End: 2020-05-20

## 2020-05-20 ENCOUNTER — PATIENT MESSAGE (OUTPATIENT)
Dept: PRIMARY CARE CLINIC | Facility: CLINIC | Age: 35
End: 2020-05-20

## 2020-05-21 ENCOUNTER — PATIENT MESSAGE (OUTPATIENT)
Dept: GASTROENTEROLOGY | Facility: CLINIC | Age: 35
End: 2020-05-21

## 2020-05-22 ENCOUNTER — OFFICE VISIT (OUTPATIENT)
Dept: PRIMARY CARE CLINIC | Facility: CLINIC | Age: 35
End: 2020-05-22
Payer: COMMERCIAL

## 2020-05-22 VITALS
OXYGEN SATURATION: 99 % | WEIGHT: 207.25 LBS | TEMPERATURE: 98 F | DIASTOLIC BLOOD PRESSURE: 74 MMHG | BODY MASS INDEX: 33.31 KG/M2 | HEART RATE: 87 BPM | SYSTOLIC BLOOD PRESSURE: 124 MMHG | HEIGHT: 66 IN

## 2020-05-22 DIAGNOSIS — F43.0 ANXIETY IN ACUTE STRESS REACTION: ICD-10-CM

## 2020-05-22 DIAGNOSIS — K29.00 ACUTE GASTRITIS WITHOUT HEMORRHAGE, UNSPECIFIED GASTRITIS TYPE: ICD-10-CM

## 2020-05-22 DIAGNOSIS — K21.00 REFLUX ESOPHAGITIS: Primary | ICD-10-CM

## 2020-05-22 DIAGNOSIS — F41.1 ANXIETY IN ACUTE STRESS REACTION: ICD-10-CM

## 2020-05-22 PROCEDURE — 99213 PR OFFICE/OUTPT VISIT, EST, LEVL III, 20-29 MIN: ICD-10-PCS | Mod: S$GLB,,, | Performed by: INTERNAL MEDICINE

## 2020-05-22 PROCEDURE — 3008F BODY MASS INDEX DOCD: CPT | Mod: CPTII,S$GLB,, | Performed by: INTERNAL MEDICINE

## 2020-05-22 PROCEDURE — 99999 PR PBB SHADOW E&M-EST. PATIENT-LVL III: CPT | Mod: PBBFAC,,, | Performed by: INTERNAL MEDICINE

## 2020-05-22 PROCEDURE — 99999 PR PBB SHADOW E&M-EST. PATIENT-LVL III: ICD-10-PCS | Mod: PBBFAC,,, | Performed by: INTERNAL MEDICINE

## 2020-05-22 PROCEDURE — 3008F PR BODY MASS INDEX (BMI) DOCUMENTED: ICD-10-PCS | Mod: CPTII,S$GLB,, | Performed by: INTERNAL MEDICINE

## 2020-05-22 PROCEDURE — 99213 OFFICE O/P EST LOW 20 MIN: CPT | Mod: S$GLB,,, | Performed by: INTERNAL MEDICINE

## 2020-05-27 ENCOUNTER — TELEPHONE (OUTPATIENT)
Dept: OTOLARYNGOLOGY | Facility: CLINIC | Age: 35
End: 2020-05-27

## 2020-05-27 ENCOUNTER — PATIENT OUTREACH (OUTPATIENT)
Dept: ADMINISTRATIVE | Facility: OTHER | Age: 35
End: 2020-05-27

## 2020-05-27 NOTE — TELEPHONE ENCOUNTER
----- Message from Padmini Santizo sent at 5/27/2020  9:31 AM CDT -----  Contact: pt   Pt is returning the nurses phone call to accept the 7:30 am appt that was offered to her pt is asking what day it will be on can you please call pt at [phone].    LINDA

## 2020-06-02 ENCOUNTER — PATIENT OUTREACH (OUTPATIENT)
Dept: ADMINISTRATIVE | Facility: OTHER | Age: 35
End: 2020-06-02

## 2020-06-04 ENCOUNTER — OFFICE VISIT (OUTPATIENT)
Dept: INTERNAL MEDICINE | Facility: CLINIC | Age: 35
End: 2020-06-04
Payer: COMMERCIAL

## 2020-06-04 VITALS
OXYGEN SATURATION: 99 % | SYSTOLIC BLOOD PRESSURE: 138 MMHG | BODY MASS INDEX: 34.47 KG/M2 | HEIGHT: 66 IN | TEMPERATURE: 99 F | DIASTOLIC BLOOD PRESSURE: 80 MMHG | HEART RATE: 94 BPM | WEIGHT: 214.5 LBS

## 2020-06-04 DIAGNOSIS — E66.9 OBESITY (BMI 30.0-34.9): ICD-10-CM

## 2020-06-04 DIAGNOSIS — H92.02 ACUTE OTALGIA, LEFT: Primary | ICD-10-CM

## 2020-06-04 DIAGNOSIS — H61.23 BILATERAL IMPACTED CERUMEN: ICD-10-CM

## 2020-06-04 PROCEDURE — 99999 PR PBB SHADOW E&M-EST. PATIENT-LVL IV: CPT | Mod: PBBFAC,,, | Performed by: NURSE PRACTITIONER

## 2020-06-04 PROCEDURE — 99214 PR OFFICE/OUTPT VISIT, EST, LEVL IV, 30-39 MIN: ICD-10-PCS | Mod: S$GLB,,, | Performed by: NURSE PRACTITIONER

## 2020-06-04 PROCEDURE — 99214 OFFICE O/P EST MOD 30 MIN: CPT | Mod: S$GLB,,, | Performed by: NURSE PRACTITIONER

## 2020-06-04 PROCEDURE — 3008F BODY MASS INDEX DOCD: CPT | Mod: CPTII,S$GLB,, | Performed by: NURSE PRACTITIONER

## 2020-06-04 PROCEDURE — 3008F PR BODY MASS INDEX (BMI) DOCUMENTED: ICD-10-PCS | Mod: CPTII,S$GLB,, | Performed by: NURSE PRACTITIONER

## 2020-06-04 PROCEDURE — 99999 PR PBB SHADOW E&M-EST. PATIENT-LVL IV: ICD-10-PCS | Mod: PBBFAC,,, | Performed by: NURSE PRACTITIONER

## 2020-06-04 NOTE — PROGRESS NOTES
Subjective:       Patient ID: Kaylin Vargas is a 35 y.o. female.    Chief Complaint: Pain (left ear)    Pt of Dr. Madrigal, here for left ear pain.    Otalgia    There is pain in the left ear. This is a new problem. The current episode started in the past 7 days (2 days ago). The problem occurs constantly. The problem has been unchanged. There has been no fever. The pain is at a severity of 5/10. The pain is moderate. Associated symptoms include headaches. Pertinent negatives include no abdominal pain, coughing, ear discharge, hearing loss, neck pain, rash, rhinorrhea, sore throat or vomiting. She has tried nothing for the symptoms. The treatment provided no relief.     Review of Systems   Constitutional: Negative for activity change, appetite change, chills, diaphoresis, fatigue, fever and unexpected weight change.   HENT: Positive for ear pain. Negative for congestion, ear discharge, hearing loss, rhinorrhea and sore throat.    Respiratory: Negative for cough and chest tightness.    Cardiovascular: Negative for chest pain.   Gastrointestinal: Negative for abdominal pain, nausea and vomiting.   Musculoskeletal: Negative for arthralgias and neck pain.   Skin: Negative for color change, pallor and rash.   Allergic/Immunologic: Negative for environmental allergies, food allergies and immunocompromised state.   Neurological: Positive for headaches. Negative for dizziness, syncope and weakness.   Hematological: Negative for adenopathy. Does not bruise/bleed easily.   Psychiatric/Behavioral: Negative for suicidal ideas.         Review of patient's allergies indicates:  No Known Allergies      Current Outpatient Medications:     aluminum-magnesium hydroxide-simethicone (MAALOX) 200-200-20 mg/5 mL Susp, Take 30 mLs by mouth 3 (three) times daily as needed., Disp: 354 mL, Rfl: 1    famotidine (PEPCID) 40 MG tablet, Take 1 tablet (40 mg total) by mouth every evening., Disp: 60 tablet, Rfl: 5    fluticasone  propionate (FLONASE) 50 mcg/actuation nasal spray, 1 spray (50 mcg total) by Each Nostril route 2 (two) times daily., Disp: 18.2 mL, Rfl: 0    hyoscyamine (LEVSIN/SL) 0.125 mg Subl, Place 1 tablet (0.125 mg total) under the tongue every 4 (four) hours as needed. 1 or 2, SL, q 3-4 hrs, to relieve esophagus spasm., Disp: 20 tablet, Rfl: 11    levocetirizine (XYZAL) 5 MG tablet, Take 1 tablet (5 mg total) by mouth every evening., Disp: 30 tablet, Rfl: 0    omeprazole (PRILOSEC) 40 MG capsule, Take 1 capsule (40 mg total) by mouth once daily., Disp: 90 capsule, Rfl: 3    sucralfate (CARAFATE) 1 gram tablet, Take 1 tablet (1 g total) by mouth 4 (four) times daily before meals and nightly., Disp: 120 tablet, Rfl: 11    Patient Active Problem List   Diagnosis    Obesity (BMI 35.0-39.9 without comorbidity)    BMI 38.0-38.9,adult    Suspected sleep apnea    Acute viral syndrome    Contact w and exposure to oth viral communicable diseases    Gastro-esophageal reflux disease without esophagitis    Anxiety disorder, unspecified    Allergic rhinitis, unspecified    Epigastric pain     Past Medical History:   Diagnosis Date    Acne     Anxiety disorder, unspecified 4/30/2020    Gastro-esophageal reflux disease without esophagitis 4/30/2020    Obesity      Past Surgical History:   Procedure Laterality Date    ESOPHAGOGASTRODUODENOSCOPY N/A 5/11/2020    Procedure: EGD (ESOPHAGOGASTRODUODENOSCOPY);  Surgeon: Ramiro Trejo Jr., MD;  Location: [facility];  Service: Endoscopy;  Laterality: N/A;     Social History     Socioeconomic History    Marital status: Single     Spouse name: Not on file    Number of children: 2    Years of education: Not on file    Highest education level: Not on file   Occupational History    Not on file   Social Needs    Financial resource strain: Not hard at all    Food insecurity:     Worry: Never true     Inability: Never true    Transportation needs:     Medical: No      "Non-medical: No   Tobacco Use    Smoking status: Never Smoker    Smokeless tobacco: Never Used   Substance and Sexual Activity    Alcohol use: Yes     Alcohol/week: 2.0 standard drinks     Types: 2 Shots of liquor per week     Frequency: Monthly or less     Drinks per session: 1 or 2     Binge frequency: Monthly     Comment: Once a week    Drug use: No    Sexual activity: Yes     Partners: Male   Lifestyle    Physical activity:     Days per week: 5 days     Minutes per session: 30 min    Stress: Very much   Relationships    Social connections:     Talks on phone: Once a week     Gets together: Never     Attends [employer] service: Not on file     Active member of club or organization: Yes     Attends meetings of clubs or organizations: More than 4 times per year     Relationship status:    Other Topics Concern    Are you pregnant or think you may be? Not Asked    Breast-feeding Not Asked   Social History Narrative    . Adolescent children.      Family History   Problem Relation Age of Onset    Diabetes Father     Hypertension Father     Hypertension Mother     Kidney disease Mother     Heart disease Mother     Crohn's disease Sister     Aneurysm Maternal Grandmother     Heart disease Maternal Grandfather     Heart disease Paternal Grandmother     Breast cancer Neg Hx     Colon cancer Neg Hx     Ovarian cancer Neg Hx     Melanoma Neg Hx        Objective:       Vitals:    06/04/20 1553   BP: 138/80   Pulse: 94   Temp: 99.2 °F (37.3 °C)   SpO2: 99%   Weight: 97.3 kg (214 lb 8.1 oz)   Height: 5' 6" (1.676 m)   PainSc:   5   PainLoc: Ear     Body mass index is 34.62 kg/m².    Physical Exam   Constitutional: She is oriented to person, place, and time. She appears well-developed and well-nourished.   obese   HENT:   Head: Normocephalic.   Right Ear: There is drainage.   Left Ear: There is drainage and tenderness.   Bilateral cerumen impaction, TM not visible   Eyes: Pupils are equal, " round, and reactive to light. Conjunctivae and EOM are normal.   Neck: Normal range of motion. Neck supple.   Cardiovascular: Normal rate.   Pulmonary/Chest: Effort normal.   Musculoskeletal: Normal range of motion.   Lymphadenopathy:     She has no cervical adenopathy.   Neurological: She is alert and oriented to person, place, and time.   Skin: Skin is warm and dry. Capillary refill takes less than 2 seconds.   Psychiatric: She has a normal mood and affect. Her behavior is normal. Judgment and thought content normal.   Nursing note and vitals reviewed.      Assessment:       1. Acute otalgia, left    2. Bilateral impacted cerumen    3. BMI 34.0-34.9,adult    4. Obesity (BMI 30.0-34.9)        Plan:       Kaylin Larson was seen today for pain.    Diagnoses and all orders for this visit:    Acute otalgia, left  Bilateral impacted cerumen  -     Ambulatory referral/consult to ENT    Needs to see ENT for cerumen impaction removal referral placed    In the meantime, may get otc cerumenex drops to loosen wax    Avoid Qtips in ears    Avoid excess water in ears    BMI 34.0-34.9,adult  BMI reviewed    Obesity (BMI 30.0-34.9)  BMI reviewed.    Diet and exercise to lose weight.    Self care instructions provided in AVS    Follow up if symptoms worsen or fail to improve.

## 2020-06-04 NOTE — PATIENT INSTRUCTIONS
Needs to see ENT for cerumen impaction removal referral placed    In the meantime, may get otc cerumenex drops to loosen wax    Avoid Qtips in ears    Avoid excess water in ears      Impacted Earwax    Impacted earwax is a buildup of the natural wax in the ear (cerumen). Impacted earwax is very common. It can cause symptoms such as hearing loss. It can also stop a doctor doing an exam of your ear.  Understanding earwax  Tiny glands in your ear make substances that combine with dead skin cells to form earwax. Earwax helps protect your ear canal from water, dirt, infection, and injury. Over time, earwax travels from the inner part of your ear canal to the entrance of the canal. Then it falls away naturally. But in some cases, it cant travel to the entrance of the canal. This may be because of a health condition or objects put in the ear. With age, earwax tends to become harder and less fluid. Older adults are more likely to have problems with earwax buildup.  What causes impacted earwax?  Earwax can build up because of many health conditions. Some cause a physical blockage. Others cause too much earwax to be made. Health conditions that can cause earwax buildup include:  · Bony blockage in the ear (osteoma or exostoses)  · Infections, such as swimmers ear (external otitis)  · Skin disease, such as eczema  · Autoimmune diseases, such as lupus  · A narrowed ear canal from birth, chronic inflammation, or injury  · Too much earwax because of injury  · Too much earwax because of  water in the ear canal  Objects repeatedly placed in the ear can also cause impacted earwax. For example, putting cotton swabs in the ear may push the wax deeper into the ear. Over time, this may cause blockage. Hearing aids, swimming plugs, and swim molds can cause the same problem when used again and again.  In some cases, the cause of impacted earwax is not known.  Symptoms of impacted earwax  Excess earwax usually does not cause any  symptoms, unless there is a large amount of buildup. Then it may cause symptoms such as:  · Hearing loss  · Earache  · Sense of ear fullness  · Itching in the ear  · Dizziness  · Ringing in the ears  · Cough  Treatment for impacted earwax  If you dont have symptoms, you may not need treatment. Often the earwax goes away on its own with time. If you have symptoms, you may have 1 or more treatments such as:  · Ear drops. These help to soften the earwax. This helps it leave the ear over time.  · Rinsing (irrigation) of the ear canal with water. This is done in a doctors office.  · Removal of the earwax with small tools. This is also done in a doctors office.  In rare cases, some treatments for earwax removal may cause complications such as:  · Swimmers ear (otitis external)  · Earache  · Short-term hearing loss  · Dizziness  · Water trapped in the ear canal  · Hole in the eardrum  · Ringing in the ears  · Bleeding from the ear  Talk with your health care provider about which risks apply most to you.  Dont use these at home  Health care providers do not advise use of ear candles or ear vacuum kits. These methods are not shown to work.   Preventing impacted earwax  You may not be able to prevent impacted earwax if you have a health condition that causes it, such as eczema. In other cases, you may be able to prevent earwax buildup by:  · Using ear drops once a week  · Having routine cleaning of the ear about every 6 months  · Not using cotton swabs in the ear  When to call the health care provider  Call your health care provider right away if you have severe symptoms after earwax removal. These may include bleeding or severe ear pain.   Date Last Reviewed: 3/19/2015  © 7377-0332 [employer]. [address] [place] [address], [place] [address]. All rights reserved. This information is not intended as a substitute for professional medical care. Always follow your healthcare professional's instructions.

## 2020-06-08 ENCOUNTER — PATIENT OUTREACH (OUTPATIENT)
Dept: ADMINISTRATIVE | Facility: OTHER | Age: 35
End: 2020-06-08

## 2020-06-08 NOTE — PROGRESS NOTES
Subjective:       Patient ID: Kaylin Vargas is a 35 y.o. female.    Chief Complaint: Follow-up    Last seen by me 2018. Numerous recent visits with other providers including urgent care, telemedicine, ER, and specialists GI and ENT for respiratory infections including COVID-19 late March, allergies, sinusitis and chest pain determined after several evaluations to be GERD with esophagitis and antritis on EGD 20, no dysplasia, no H pylori. She has been treated with allergy meds, antibiotics, antacids. Was last prescribed Omeprazole 40mg BID with Sucralfate and Levsin prn. She's had a great deal of anxiety throughout this process resulting in loss of sleep, weight loss and repeated trips to the doctor for reassurance. Has missed many days of work, decided to take a continuous leave of absence for the month of May until work up completed and condition improved. She is planning return to regular duty on 20 and needs [facility] paperwork completed.     PMH: .   GERD with Esophagitis and Gastritis.  Allergic Rhinitis.   Anxiety and Insomnia.  Obesity.  Menstrual Migraines.     PSH: None.     Flu shot 10/19. Tetanus . No recent Eye exam. Pap normal .    Social: Non-smoker, social alcohol.  with two adolescent children. 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     FMH: Mother developed HTN as a young adult, ESRD on peritoneal dialysis,  four months ago at age 61 of anoxic brain injury after cardiac arrest. Father  age 86 with HTN, DM, stroke and prostate cancer. Sister with Crohn's disease. Heart dis and brain aneurysm in grandparents.    NKDA.    Medications: list reviewed and reconciled.         Review of Systems   Constitutional: Positive for activity change and unexpected weight change. Negative for chills and fever.   HENT: Negative for ear pain, hearing loss, rhinorrhea, sinus pressure, sinus pain and trouble swallowing.    Eyes: Negative for discharge and  "visual disturbance.   Respiratory: Positive for chest tightness. Negative for cough, shortness of breath and wheezing.    Cardiovascular: Positive for chest pain. Negative for palpitations.   Gastrointestinal: Negative for blood in stool, constipation, diarrhea and vomiting.   Endocrine: Negative for polydipsia and polyuria.   Genitourinary: Negative for difficulty urinating, dysuria, hematuria and menstrual problem.   Musculoskeletal: Negative for arthralgias, joint swelling and neck pain.   Skin: Negative for rash and wound.   Neurological: Negative for dizziness, weakness and headaches.   Psychiatric/Behavioral: Positive for dysphoric mood. Negative for agitation, behavioral problems, confusion and suicidal ideas. The patient is nervous/anxious.        Objective:    /74, Pulse 87, Temp 98, O2 Sat 99%, Ht 5' 6", Wt 207 lbs, BMI=33.45  Physical Exam   Constitutional: She is oriented to person, place, and time. She appears well-developed and well-nourished. No distress.   HENT:   Head: Normocephalic and atraumatic.   Eyes: EOM are normal. Right conjunctiva is not injected. Left conjunctiva is not injected. No scleral icterus.   Neck: Normal range of motion. Neck supple.   Cardiovascular: Normal rate, regular rhythm and normal heart sounds.   Pulmonary/Chest: Effort normal and breath sounds normal. No respiratory distress. She has no wheezes. She has no rhonchi. She has no rales.   Abdominal: Soft. Bowel sounds are normal. She exhibits no distension. There is no hepatosplenomegaly. There is no tenderness. There is no CVA tenderness.   Musculoskeletal: Normal range of motion. She exhibits no edema.   Neurological: She is alert and oriented to person, place, and time. She has normal strength and normal reflexes. Gait normal.   Skin: Skin is warm and dry. No lesion noted. No cyanosis. No pallor. Nails show no clubbing.   Psychiatric: She has a normal mood and affect. Her behavior is normal. Thought content normal. "   Vitals reviewed.      Assessment:       1. Reflux esophagitis    2. Acute gastritis without hemorrhage, unspecified gastritis type    3. Anxiety in acute stress reaction        Plan:       Reflux esophagitis - continue Omeprazole and Sucralfate, f/u with GI.     Acute gastritis without hemorrhage, unspecified gastritis type - see above.     Anxiety in acute stress reaction - has Trazodone as needed for sleep.     FMLA forms completed with return to regular duty 6/1/20 as planned.

## 2020-06-09 ENCOUNTER — OFFICE VISIT (OUTPATIENT)
Dept: OTOLARYNGOLOGY | Facility: CLINIC | Age: 35
End: 2020-06-09
Payer: COMMERCIAL

## 2020-06-09 DIAGNOSIS — J31.0 CHRONIC RHINITIS: ICD-10-CM

## 2020-06-09 DIAGNOSIS — H61.23 BILATERAL IMPACTED CERUMEN: Primary | ICD-10-CM

## 2020-06-09 DIAGNOSIS — J34.3 NASAL TURBINATE HYPERTROPHY: ICD-10-CM

## 2020-06-09 PROCEDURE — 69210 EAR CERUMEN REMOVAL: ICD-10-PCS | Mod: S$GLB,,, | Performed by: NURSE PRACTITIONER

## 2020-06-09 PROCEDURE — 99214 PR OFFICE/OUTPT VISIT, EST, LEVL IV, 30-39 MIN: ICD-10-PCS | Mod: 25,S$GLB,, | Performed by: NURSE PRACTITIONER

## 2020-06-09 PROCEDURE — 99999 PR PBB SHADOW E&M-EST. PATIENT-LVL II: ICD-10-PCS | Mod: PBBFAC,,, | Performed by: NURSE PRACTITIONER

## 2020-06-09 PROCEDURE — 99214 OFFICE O/P EST MOD 30 MIN: CPT | Mod: 25,S$GLB,, | Performed by: NURSE PRACTITIONER

## 2020-06-09 PROCEDURE — 99999 PR PBB SHADOW E&M-EST. PATIENT-LVL II: CPT | Mod: PBBFAC,,, | Performed by: NURSE PRACTITIONER

## 2020-06-09 PROCEDURE — 69210 REMOVE IMPACTED EAR WAX UNI: CPT | Mod: S$GLB,,, | Performed by: NURSE PRACTITIONER

## 2020-06-09 RX ORDER — FLUTICASONE PROPIONATE 50 MCG
2 SPRAY, SUSPENSION (ML) NASAL DAILY
Qty: 16 G | Refills: 2 | Status: SHIPPED | OUTPATIENT
Start: 2020-06-09 | End: 2020-07-09

## 2020-06-09 NOTE — PROGRESS NOTES
Subjective:      Kaylin Vargas is a 35 y.o. female who was referred to me by Zonia Magdaleno in consultation for aural fullness.    Ms. Vargas reports bilateral ear fullness for the past several weeks. She has associated intermittent ear pain and pressure and nasal congestion. She denies itchy nose, sneezing, or runny nose. She denies any change in hearing, tinnitus or dizziness. She has tried fluticasone for her nasal congestion with some benefit. She is now out of the fluticasone.      Past Medical History  She has a past medical history of Acne, Anxiety disorder, unspecified, Gastro-esophageal reflux disease without esophagitis, and Obesity.    Past Surgical History  She has a past surgical history that includes Esophagogastroduodenoscopy (N/A, 5/11/2020).    Family History  Her family history includes Aneurysm in her maternal grandmother; Crohn's disease in her sister; Diabetes in her father; Heart disease in her maternal grandfather, mother, and paternal grandmother; Hypertension in her father and mother; Kidney disease in her mother.    Social History  She reports that she has never smoked. She has never used smokeless tobacco. She reports that she drinks about 2.0 standard drinks of alcohol per week. She reports that she does not use drugs.    Allergies  She has No Known Allergies.    Medications  She has a current medication list which includes the following prescription(s): aluminum-magnesium hydroxide-simethicone, famotidine, hyoscyamine, levocetirizine, omeprazole, sucralfate, and fluticasone propionate.    Review of Systems   Constitutional: Negative for chills, fatigue and fever.   HENT: Positive for congestion, ear pain and sinus pressure. Negative for ear discharge, facial swelling, nosebleeds, postnasal drip, rhinorrhea, sinus pain, sneezing, sore throat and tinnitus.    Eyes: Negative for photophobia, redness, itching and visual disturbance.   Respiratory: Negative for apnea, cough,  shortness of breath, wheezing and stridor.    Cardiovascular: Negative for chest pain and palpitations.   Gastrointestinal: Negative for diarrhea, nausea and vomiting.   Endocrine: Negative.    Genitourinary: Negative for decreased urine volume, dysuria and frequency.   Musculoskeletal: Negative for arthralgias, myalgias and neck stiffness.   Skin: Negative for rash and wound.   Allergic/Immunologic: Negative for environmental allergies, food allergies and immunocompromised state.   Neurological: Negative for dizziness, syncope, weakness, light-headedness and headaches.   Hematological: Negative for adenopathy. Does not bruise/bleed easily.   Psychiatric/Behavioral: Negative for confusion, decreased concentration and sleep disturbance.          Objective:     There were no vitals taken for this visit.     Constitutional:   She is oriented to person, place, and time. Vital signs are normal. She appears well-developed and well-nourished. She appears alert. Normal speech.      Head:  Normocephalic and atraumatic.     Ears:    Right Ear: No lacerations. No drainage, swelling or tenderness. No foreign bodies. No mastoid tenderness. Tympanic membrane is not injected, not scarred, not perforated, not erythematous, not retracted and not bulging. Tympanic membrane mobility is normal. No middle ear effusion. No hemotympanum.   Left Ear: No lacerations. No drainage, swelling or tenderness. No foreign bodies. No mastoid tenderness. Tympanic membrane is not injected, not scarred, not perforated, not erythematous, not retracted and not bulging. Tympanic membrane mobility is normal.  No middle ear effusion. No hemotympanum.   Ears:      Nose:  Mucosal edema present. No rhinorrhea, nose lacerations, sinus tenderness, septal deviation, nasal septal hematoma or polyps. No epistaxis.  No foreign bodies. Turbinate hypertrophy.  Right sinus exhibits no maxillary sinus tenderness and no frontal sinus tenderness. Left sinus exhibits no  maxillary sinus tenderness and no frontal sinus tenderness.     Mouth/Throat  Oropharynx clear and moist without lesions or asymmetry, normal uvula midline and lips, teeth, and gums normal. No uvula swelling, oral lesions, trismus, mucous membrane lesions or xerostomia. No oropharyngeal exudate, posterior oropharyngeal edema or posterior oropharyngeal erythema.     Neck:  Neck normal without thyromegaly masses, asymmetry, normal tracheal structure, crepitus, and tenderness and no adenopathy.     Psychiatric:   She has a normal mood and affect. Her speech is normal and behavior is normal.     Neurological:   She is alert and oriented to person, place, and time. No cranial nerve deficit.     Skin:   No abrasions, lacerations, lesions, or rashes.       Procedure    None      Data Reviewed    WBC (K/uL)   Date Value   05/07/2020 10.97     Platelets (K/uL)   Date Value   05/07/2020 278      Creatinine (mg/dL)   Date Value   05/02/2020 0.9     TSH (uIU/mL)   Date Value   02/22/2016 1.580     Glucose (mg/dL)   Date Value   05/02/2020 92     Hemoglobin A1C (%)   Date Value   07/26/2018 5.2          Assessment:     1. Bilateral impacted cerumen    2. Chronic rhinitis    3. Nasal turbinate hypertrophy         Plan:     I had a long discussion with the patient regarding her condition and the further workup and management options.    Cerumen impaction removed under microscope.  Kaylin Larson was seen today for cerumen impaction, sinus problem, ear drainage, ear fullness and sinusitis.    Diagnoses and all orders for this visit:    Bilateral impacted cerumen  -     Ear Cerumen Removal    Chronic rhinitis  -     fluticasone propionate (FLONASE) 50 mcg/actuation nasal spray; Instill 2 sprays (100 mcg total) in each nostril once daily.    Nasal turbinate hypertrophy      - start nasal saline rinses with distilled water 1-2 times daily.      Follow up if symptoms worsen or fail to improve.

## 2020-06-09 NOTE — LETTER
June 9, 2020      Zonia Magdaleno DNP  [address]  [facility] [address]           [facility]  [address]  [facility] [address]  Phone: [phone]  Fax: [phone]          Patient: Kaylin Vargas   MR Number: [id number]   YOB: 1985   Date of Visit: 6/9/2020       Dear Zonia Magdaleno:    Thank you for referring Kaylin Vargas to me for evaluation. Attached you will find relevant portions of my assessment and plan of care.    If you have questions, please do not hesitate to call me. I look forward to following Kaylin Vargas along with you.    Sincerely,    Mark Guerrero, NP    Enclosure  CC:  No Recipients    If you would like to receive this communication electronically, please contact [email or web address] or [phone] to request more information on [employer] Link access.    For providers and/or their staff who would like to refer a patient to [facility], please contact us through our one-stop-shop provider referral line, [facility] , at [phone].    If you feel you have received this communication in error or would no longer like to receive these types of communications, please e-mail [email or web address]

## 2020-06-09 NOTE — PROCEDURES
Ear Cerumen Removal  Date/Time: 6/9/2020 11:00 AM  Performed by: Mark Guerrero NP  Authorized by: Mark Guerrero NP     Location details:  Both ears  Procedure type: curette    Cerumen  Removal Results:  Cerumen completely removed  Patient tolerance:  Patient tolerated the procedure well with no immediate complications     Procedure Note:    The patient was brought to the minor procedure room and placed under the operating microscope of the left ear canal which was cleaned of ceruminous debris. Using a combination of suction, curettes and cup forceps the patient's cerumen impaction was removed. The tympanic membrane was evaluated and was unremarkable. The patient tolerated the procedure well. There were no complications.  Procedure Note:    Patient was brought to the minor procedure room and using the operating microscope of the right ear canal which was cleaned of ceruminous debris. There was a significant cerumen impaction.  Using a combination of suction, curettes and cup forceps the patient's cerumen impaction was removed. Tympanic membrane intact. Pt tolerated well. There were no complications.

## 2020-06-15 ENCOUNTER — OFFICE VISIT (OUTPATIENT)
Dept: GASTROENTEROLOGY | Facility: CLINIC | Age: 35
End: 2020-06-15
Payer: COMMERCIAL

## 2020-06-15 VITALS
HEART RATE: 99 BPM | WEIGHT: 213.31 LBS | SYSTOLIC BLOOD PRESSURE: 142 MMHG | HEIGHT: 66 IN | BODY MASS INDEX: 34.28 KG/M2 | DIASTOLIC BLOOD PRESSURE: 80 MMHG

## 2020-06-15 DIAGNOSIS — K21.9 GASTRO-ESOPHAGEAL REFLUX DISEASE WITHOUT ESOPHAGITIS: Primary | ICD-10-CM

## 2020-06-15 PROCEDURE — 3008F BODY MASS INDEX DOCD: CPT | Mod: CPTII,S$GLB,, | Performed by: NURSE PRACTITIONER

## 2020-06-15 PROCEDURE — 3008F PR BODY MASS INDEX (BMI) DOCUMENTED: ICD-10-PCS | Mod: CPTII,S$GLB,, | Performed by: NURSE PRACTITIONER

## 2020-06-15 PROCEDURE — 99214 OFFICE O/P EST MOD 30 MIN: CPT | Mod: S$GLB,,, | Performed by: NURSE PRACTITIONER

## 2020-06-15 PROCEDURE — 99999 PR PBB SHADOW E&M-EST. PATIENT-LVL III: CPT | Mod: PBBFAC,,, | Performed by: NURSE PRACTITIONER

## 2020-06-15 PROCEDURE — 99214 PR OFFICE/OUTPT VISIT, EST, LEVL IV, 30-39 MIN: ICD-10-PCS | Mod: S$GLB,,, | Performed by: NURSE PRACTITIONER

## 2020-06-15 PROCEDURE — 99999 PR PBB SHADOW E&M-EST. PATIENT-LVL III: ICD-10-PCS | Mod: PBBFAC,,, | Performed by: NURSE PRACTITIONER

## 2020-06-15 RX ORDER — PANTOPRAZOLE SODIUM 40 MG/1
40 TABLET, DELAYED RELEASE ORAL DAILY
Qty: 30 TABLET | Refills: 3 | Status: SHIPPED | OUTPATIENT
Start: 2020-06-15 | End: 2020-08-18

## 2020-06-15 NOTE — PATIENT INSTRUCTIONS

## 2020-06-15 NOTE — LETTER
Dee 15, 2020      [facility]  [address], [place]   [place] [address]  Phone: [phone]  Fax: [phone]       Patient: Kaylin Vargas   YOB: 1985  Date of Visit: 06/15/2020    To Whom It May Concern:    Kimberly Vargas  was at [facility] on 06/15/2020. She may return to work/school on 06/16/2020 with no restrictions. If you have any questions or concerns, or if I can be of further assistance, please do not hesitate to contact me.    Sincerely,    MARISSA Perez

## 2020-06-16 NOTE — PROGRESS NOTES
Subjective:       Patient ID: Kalyin Vargas is a 35 y.o. female.    Chief Complaint: Gastroesophageal Reflux    34 y/o female presents to clinic with c/o GERD. Patient has been to the emergency room and at least 2 GI specialists with c/o epigastric pain since mid-April of this year. She reports symptoms started after possible COVID 19 infection (states she was not tested for virus when symptomatic but later tested positive for COVID antibodies). EGD 5/11/2020 with Dr. Trejo showed mild esophagitis, pathology negative for H. Pylori and EoE. Prescribed omeprazole 40 mg bid, famotidine 40 mg every evening, sucralfate prn, and Levsin prn without much relief. She has stopped the omeprazole and started Nexium OTC 1 week ago which seems to work better. Describes the epigastric as dull and worse after most meals. Also reports 20 lbs weight loss over 2 months 2/2 anorexia. Denies dysphagia, n/v, hematemesis, or bloody or dark stools.      Past Medical History:   Diagnosis Date    Acne     Anxiety disorder, unspecified 4/30/2020    Gastro-esophageal reflux disease without esophagitis 4/30/2020    Obesity        Past Surgical History:   Procedure Laterality Date    ESOPHAGOGASTRODUODENOSCOPY N/A 5/11/2020    Procedure: EGD (ESOPHAGOGASTRODUODENOSCOPY);  Surgeon: Ramiro Trejo Jr., MD;  Location: [facility];  Service: Endoscopy;  Laterality: N/A;       Family History   Problem Relation Age of Onset    Diabetes Father     Hypertension Father     Hypertension Mother     Kidney disease Mother     Heart disease Mother     Crohn's disease Sister     Aneurysm Maternal Grandmother     Heart disease Maternal Grandfather     Heart disease Paternal Grandmother     Breast cancer Neg Hx     Colon cancer Neg Hx     Ovarian cancer Neg Hx     Melanoma Neg Hx        Social History     Socioeconomic History    Marital status: Single     Spouse name: Not on file    Number of children: 2    Years of education: Not  on file    Highest education level: Not on file   Occupational History    Not on file   Social Needs    Financial resource strain: Not hard at all    Food insecurity     Worry: Never true     Inability: Never true    Transportation needs     Medical: No     Non-medical: No   Tobacco Use    Smoking status: Never Smoker    Smokeless tobacco: Never Used   Substance and Sexual Activity    Alcohol use: Yes     Alcohol/week: 2.0 standard drinks     Types: 2 Shots of liquor per week     Frequency: Monthly or less     Drinks per session: 1 or 2     Binge frequency: Monthly     Comment: Once a week    Drug use: No    Sexual activity: Yes     Partners: Male   Lifestyle    Physical activity     Days per week: 5 days     Minutes per session: 30 min    Stress: Very much   Relationships    Social connections     Talks on phone: Once a week     Gets together: Never     Attends [employer] service: Not on file     Active member of club or organization: Yes     Attends meetings of clubs or organizations: More than 4 times per year     Relationship status:    Other Topics Concern    Are you pregnant or think you may be? Not Asked    Breast-feeding Not Asked   Social History Narrative    . Adolescent children.        Review of Systems   Constitutional: Positive for unexpected weight change. Negative for fatigue and fever.   HENT: Negative for sore throat and trouble swallowing.    Respiratory: Negative for cough and shortness of breath.    Cardiovascular: Negative for chest pain.   Gastrointestinal: Positive for abdominal pain. Negative for blood in stool, constipation, diarrhea, nausea and vomiting.   Genitourinary: Negative for dysuria.   Musculoskeletal: Negative for myalgias.   Neurological: Negative for dizziness and weakness.   Hematological: Negative for adenopathy.   Psychiatric/Behavioral: Negative for dysphoric mood.         Objective:     Vitals:    06/15/20 1614   BP: (!) 142/80   BP Location:  "Right arm   Patient Position: Sitting   BP Method: Large (Manual)   Pulse: 99   Weight: 96.8 kg (213 lb 4.8 oz)   Height: 5' 5.5" (1.664 m)          Physical Exam  Constitutional:       General: She is not in acute distress.     Appearance: Normal appearance.      Comments: +obesity with: Body mass index is 34.96 kg/m².   HENT:      Head: Normocephalic.      Nose: Nose normal.   Eyes:      Pupils: Pupils are equal, round, and reactive to light.   Neck:      Musculoskeletal: Normal range of motion and neck supple.   Cardiovascular:      Rate and Rhythm: Normal rate and regular rhythm.      Heart sounds: Normal heart sounds.   Pulmonary:      Effort: Pulmonary effort is normal.      Breath sounds: Normal breath sounds.   Abdominal:      General: Abdomen is flat. Bowel sounds are normal. There is no distension.      Tenderness: There is no abdominal tenderness.   Musculoskeletal: Normal range of motion.   Skin:     General: Skin is warm and dry.   Neurological:      Mental Status: She is alert and oriented to person, place, and time.   Psychiatric:         Mood and Affect: Mood normal.         Behavior: Behavior normal.               Assessment:         ICD-10-CM ICD-9-CM   1. Gastro-esophageal reflux disease without esophagitis  K21.9 530.81       Plan:       Gastro-esophageal reflux disease without esophagitis  -     Stop omeprazole and OTC nexium. Start pantoprazole as prescribed  -     pantoprazole (PROTONIX) 40 MG tablet; Take 1 tablet (40 mg total) by mouth once daily.  Dispense: 30 tablet; Refill: 3        -     Patient advised to take pantoprazole 40 mg in am on empty stomach 30-45 min prior to breakfast and other medication. Continue famotidine, sucralfate, and Levsin prn. If symptoms fail to improve or worsen. Will order GES.    Follow up if symptoms worsen or fail to improve.     Patient's Medications   New Prescriptions    PANTOPRAZOLE (PROTONIX) 40 MG TABLET    Take 1 tablet (40 mg total) by mouth once " daily.   Previous Medications    ALUMINUM-MAGNESIUM HYDROXIDE-SIMETHICONE (MAALOX) 200-200-20 MG/5 ML SUSP    Take 30 mLs by mouth 3 (three) times daily as needed.    FAMOTIDINE (PEPCID) 40 MG TABLET    Take 1 tablet (40 mg total) by mouth every evening.    FLUTICASONE PROPIONATE (FLONASE) 50 MCG/ACTUATION NASAL SPRAY    Instill 2 sprays (100 mcg total) in each nostril once daily.    HYOSCYAMINE (LEVSIN/SL) 0.125 MG SUBL    Place 1 tablet (0.125 mg total) under the tongue every 4 (four) hours as needed. 1 or 2, SL, q 3-4 hrs, to relieve esophagus spasm.    LEVOCETIRIZINE (XYZAL) 5 MG TABLET    Take 1 tablet (5 mg total) by mouth every evening.    SUCRALFATE (CARAFATE) 1 GRAM TABLET    Take 1 tablet (1 g total) by mouth 4 (four) times daily before meals and nightly.   Modified Medications    No medications on file   Discontinued Medications    OMEPRAZOLE (PRILOSEC) 40 MG CAPSULE    Take 1 capsule (40 mg total) by mouth once daily.

## 2020-06-22 ENCOUNTER — PATIENT OUTREACH (OUTPATIENT)
Dept: ADMINISTRATIVE | Facility: OTHER | Age: 35
End: 2020-06-22

## 2020-06-23 ENCOUNTER — OFFICE VISIT (OUTPATIENT)
Dept: PULMONOLOGY | Facility: CLINIC | Age: 35
End: 2020-06-23
Payer: COMMERCIAL

## 2020-06-23 VITALS
HEART RATE: 94 BPM | DIASTOLIC BLOOD PRESSURE: 70 MMHG | WEIGHT: 206 LBS | BODY MASS INDEX: 33.11 KG/M2 | OXYGEN SATURATION: 99 % | SYSTOLIC BLOOD PRESSURE: 130 MMHG | HEIGHT: 66 IN

## 2020-06-23 DIAGNOSIS — R06.02 SOB (SHORTNESS OF BREATH): Primary | ICD-10-CM

## 2020-06-23 DIAGNOSIS — F41.9 ANXIETY DISORDER, UNSPECIFIED TYPE: ICD-10-CM

## 2020-06-23 DIAGNOSIS — U07.1 COVID-19 VIRUS INFECTION: ICD-10-CM

## 2020-06-23 PROCEDURE — 99999 PR PBB SHADOW E&M-EST. PATIENT-LVL III: CPT | Mod: PBBFAC,,, | Performed by: INTERNAL MEDICINE

## 2020-06-23 PROCEDURE — 99204 PR OFFICE/OUTPT VISIT, NEW, LEVL IV, 45-59 MIN: ICD-10-PCS | Mod: S$GLB,,, | Performed by: INTERNAL MEDICINE

## 2020-06-23 PROCEDURE — 99204 OFFICE O/P NEW MOD 45 MIN: CPT | Mod: S$GLB,,, | Performed by: INTERNAL MEDICINE

## 2020-06-23 PROCEDURE — 3008F PR BODY MASS INDEX (BMI) DOCUMENTED: ICD-10-PCS | Mod: CPTII,S$GLB,, | Performed by: INTERNAL MEDICINE

## 2020-06-23 PROCEDURE — 99999 PR PBB SHADOW E&M-EST. PATIENT-LVL III: ICD-10-PCS | Mod: PBBFAC,,, | Performed by: INTERNAL MEDICINE

## 2020-06-23 PROCEDURE — 3008F BODY MASS INDEX DOCD: CPT | Mod: CPTII,S$GLB,, | Performed by: INTERNAL MEDICINE

## 2020-06-23 NOTE — PROGRESS NOTES
Subjective:       Patient ID: Kaylin Vargas is a 35 y.o. female.    Chief Complaint: Abnormal Ct Scan    HPI   Kaylin Vargas 35 y.o. female    has a past medical history of Acne, Anxiety disorder, unspecified (4/30/2020), Gastro-esophageal reflux disease without esophagitis (4/30/2020), and Obesity.    has a past surgical history that includes Esophagogastroduodenoscopy (N/A, 5/11/2020).   reports that she has never smoked. She has never used smokeless tobacco. She reports current alcohol use of about 2.0 standard drinks of alcohol per week. She reports that she does not use drugs.  Referred by: Aaareferral Self  Who had concerns including Abnormal Ct Scan.  The patient's last visit with me was on Visit date not found.  LAST VISIT    Interval History  Covid in March- not hospitalized, had respiratory sx but then resolved  April, had gi issues  Having   Feeling uneasy about lungs,   Not short of breath- sometimes hard to catch breath when having gas type symptoms  occ pain with   Not exercisiing- used to go 3x per week to gym  No cough  Has a lot of anxiety about her lungs and in general.   No fever chills, ns, wt changes, nausea, vomiting, diarrhea, constipation, chest pain, tightness, pressure. Remainder of review of systems is negative.  Otherwise asymptomatic from pulmonary standpoint.    Review of Systems   All other systems reviewed and are negative.      Objective:      Physical Exam   Constitutional: She is oriented to person, place, and time. She appears well-developed and well-nourished. She appears not cachectic. No distress. She is not obese.   HENT:   Head: Normocephalic.   Right Ear: External ear normal.   Left Ear: External ear normal.   Nose: Nose normal. No mucosal edema.   Mouth/Throat: Normal dentition. No oropharyngeal exudate.   Neck: Normal range of motion. Neck supple. No tracheal deviation present. No thyromegaly present.   Cardiovascular: Normal rate, regular rhythm, normal  heart sounds and intact distal pulses. Exam reveals no gallop and no friction rub.   No murmur heard.  Pulmonary/Chest: Normal expansion, symmetric chest wall expansion, effort normal and breath sounds normal. No stridor. No respiratory distress. She has no decreased breath sounds. She has no wheezes. She has no rhonchi. She has no rales. Chest wall is not dull to percussion. She exhibits no tenderness. Negative for egophony. Negative for tactile fremitus.   Abdominal: Soft. Bowel sounds are normal. She exhibits no distension.   Musculoskeletal: Normal range of motion.         General: No tenderness, deformity or edema.   Lymphadenopathy: No supraclavicular adenopathy is present.     She has no cervical adenopathy.   Neurological: She is alert and oriented to person, place, and time. No cranial nerve deficit. Gait normal.   Skin: Skin is warm and dry. No rash noted. She is not diaphoretic. No cyanosis or erythema. No pallor. Nails show no clubbing.   Psychiatric: She has a normal mood and affect. Her behavior is normal. Judgment and thought content normal.   Vitals reviewed.    Personal Diagnostic Review    No flowsheet data found.      Assessment:       1. SOB (shortness of breath)    2. COVID-19 virus infection    3. Anxiety disorder, unspecified type        Outpatient Encounter Medications as of 6/23/2020   Medication Sig Dispense Refill    aluminum-magnesium hydroxide-simethicone (MAALOX) 200-200-20 mg/5 mL Susp Take 30 mLs by mouth 3 (three) times daily as needed. 354 mL 1    famotidine (PEPCID) 40 MG tablet Take 1 tablet (40 mg total) by mouth every evening. 60 tablet 5    fluticasone propionate (FLONASE) 50 mcg/actuation nasal spray Instill 2 sprays (100 mcg total) in each nostril once daily. 16 g 2    hyoscyamine (LEVSIN/SL) 0.125 mg Subl Place 1 tablet (0.125 mg total) under the tongue every 4 (four) hours as needed. 1 or 2, SL, q 3-4 hrs, to relieve esophagus spasm. 20 tablet 11    levocetirizine  (XYZAL) 5 MG tablet Take 1 tablet (5 mg total) by mouth every evening. 30 tablet 0    pantoprazole (PROTONIX) 40 MG tablet Take 1 tablet (40 mg total) by mouth once daily. 30 tablet 3    sucralfate (CARAFATE) 1 gram tablet Take 1 tablet (1 g total) by mouth 4 (four) times daily before meals and nightly. 120 tablet 11     No facility-administered encounter medications on file as of 6/23/2020.      Orders Placed This Encounter   Procedures    Spirometry without Bronchodilator     Standing Status:   Future     Standing Expiration Date:   6/23/2021       Plan:               Assessment:  Kaylin Larson was seen today for abnormal ct scan.    Diagnoses and all orders for this visit:    SOB (shortness of breath)    COVID-19 virus infection  -     Spirometry without Bronchodilator; Future    Anxiety disorder, unspecified type        Plan:  Problem List Items Addressed This Visit     Anxiety disorder, unspecified    COVID-19 virus infection    Overview     Resolved cxr and symptoms         Relevant Orders    Spirometry without Bronchodilator    SOB (shortness of breath) - Primary    Overview     Predominantly an anxiety issue- cxr wnl. Check spirometry to be sure  Reassurance provided.                  Follow up if symptoms worsen or fail to improve.    Patient Instructions       Immunization History   Administered Date(s) Administered    Hepatitis B 09/28/2000    Hepatitis B, Adolescent/High Risk Infant 12/03/1999, 01/03/2000    Influenza 01/10/2013, 10/04/2018, 10/25/2019    Influenza - Quadrivalent - PF (6 months and older) 10/21/2016, 09/26/2017, 10/05/2018, 10/23/2019    PPD Test 08/01/2000, 02/09/2004, 02/11/2004

## 2020-06-24 ENCOUNTER — HOSPITAL ENCOUNTER (OUTPATIENT)
Dept: PULMONOLOGY | Facility: CLINIC | Age: 35
Discharge: HOME | End: 2020-06-24
Payer: COMMERCIAL

## 2020-06-24 DIAGNOSIS — U07.1 COVID-19 VIRUS INFECTION: ICD-10-CM

## 2020-06-24 DIAGNOSIS — J30.9 ALLERGIC RHINITIS, UNSPECIFIED: ICD-10-CM

## 2020-06-24 DIAGNOSIS — R29.818 SUSPECTED SLEEP APNEA: ICD-10-CM

## 2020-06-24 DIAGNOSIS — F41.9 ANXIETY DISORDER, UNSPECIFIED: ICD-10-CM

## 2020-06-24 DIAGNOSIS — Z20.828 CONTACT W AND EXPOSURE TO OTH VIRAL COMMUNICABLE DISEASES: ICD-10-CM

## 2020-06-24 DIAGNOSIS — R06.02 SOB (SHORTNESS OF BREATH): ICD-10-CM

## 2020-06-24 DIAGNOSIS — B34.9 ACUTE VIRAL SYNDROME: ICD-10-CM

## 2020-06-24 DIAGNOSIS — R10.13 EPIGASTRIC PAIN: ICD-10-CM

## 2020-06-24 LAB
FEF 25 75 LLN: 1.72
FEF 25 75 PRE REF: 133.3 %
FEF 25 75 REF: 3.09
FEV05 LLN: 1.43
FEV05 REF: 2.28
FEV1 FVC LLN: 73
FEV1 FVC PRE REF: 106.7 %
FEV1 FVC REF: 84
FEV1 LLN: 2.23
FEV1 PRE REF: 103 %
FEV1 REF: 2.87
FVC LLN: 2.7
FVC PRE REF: 96.1 %
FVC REF: 3.45
PEF LLN: 4.94
PEF PRE REF: 104.8 %
PEF REF: 7.08
PHYSICIAN COMMENT: NORMAL
PRE FEF 25 75: 4.12 L/S (ref 1.72–4.46)
PRE FET 100: 6.42 SEC
PRE FEV05 REF: 105.6 %
PRE FEV1 FVC: 89.06 % (ref 72.64–94.36)
PRE FEV1: 2.96 L (ref 2.23–3.51)
PRE FEV5: 2.41 L (ref 1.43–3.14)
PRE FVC: 3.32 L (ref 2.7–4.2)
PRE PEF: 7.42 L/S (ref 4.94–9.22)

## 2020-06-24 PROCEDURE — 94010 BREATHING CAPACITY TEST: CPT | Mod: S$GLB,,, | Performed by: INTERNAL MEDICINE

## 2020-06-24 PROCEDURE — 94010 BREATHING CAPACITY TEST: ICD-10-PCS | Mod: S$GLB,,, | Performed by: INTERNAL MEDICINE

## 2020-06-26 ENCOUNTER — PATIENT MESSAGE (OUTPATIENT)
Dept: PULMONOLOGY | Facility: CLINIC | Age: 35
End: 2020-06-26

## 2020-07-13 ENCOUNTER — TELEPHONE (OUTPATIENT)
Dept: ENDOSCOPY | Facility: HOSPITAL | Age: 35
End: 2020-07-13

## 2020-07-13 DIAGNOSIS — R10.13 EPIGASTRIC PAIN: ICD-10-CM

## 2020-07-13 DIAGNOSIS — R07.89 ATYPICAL CHEST PAIN: ICD-10-CM

## 2020-07-13 DIAGNOSIS — K29.70 GASTRITIS WITHOUT BLEEDING, UNSPECIFIED CHRONICITY, UNSPECIFIED GASTRITIS TYPE: ICD-10-CM

## 2020-07-13 DIAGNOSIS — K21.00 GERD WITH ESOPHAGITIS: Primary | ICD-10-CM

## 2020-07-13 DIAGNOSIS — K21.9 GASTROESOPHAGEAL REFLUX DISEASE, ESOPHAGITIS PRESENCE NOT SPECIFIED: Primary | ICD-10-CM

## 2020-07-13 DIAGNOSIS — K22.4 DIFFUSE SPASM OF ESOPHAGUS: ICD-10-CM

## 2020-07-16 ENCOUNTER — PATIENT MESSAGE (OUTPATIENT)
Dept: GASTROENTEROLOGY | Facility: CLINIC | Age: 35
End: 2020-07-16

## 2020-07-18 ENCOUNTER — LAB VISIT (OUTPATIENT)
Dept: URGENT CARE | Facility: CLINIC | Age: 35
End: 2020-07-18
Payer: COMMERCIAL

## 2020-07-18 VITALS — HEART RATE: 95 BPM | TEMPERATURE: 98 F | OXYGEN SATURATION: 96 % | RESPIRATION RATE: 16 BRPM

## 2020-07-18 DIAGNOSIS — K21.9 GASTROESOPHAGEAL REFLUX DISEASE, ESOPHAGITIS PRESENCE NOT SPECIFIED: ICD-10-CM

## 2020-07-18 PROCEDURE — U0003 INFECTIOUS AGENT DETECTION BY NUCLEIC ACID (DNA OR RNA); SEVERE ACUTE RESPIRATORY SYNDROME CORONAVIRUS 2 (SARS-COV-2) (CORONAVIRUS DISEASE [COVID-19]), AMPLIFIED PROBE TECHNIQUE, MAKING USE OF HIGH THROUGHPUT TECHNOLOGIES AS DESCRIBED BY CMS-2020-01-R: HCPCS

## 2020-07-18 NOTE — PROGRESS NOTES
Subjective:       Patient ID: Kaylin Vargas is a 35 y.o. female.    Vitals:  vitals were not taken for this visit.     Chief Complaint: COVID-19 Concerns    Pt is here for pro-op COVID swab.  Asymptomatic.    ROS    Objective:      Physical Exam      Assessment:       1. Gastroesophageal reflux disease, esophagitis presence not specified        Plan:         Gastroesophageal reflux disease, esophagitis presence not specified  -     COVID-19 Routine Screening

## 2020-07-19 LAB — SARS-COV-2 RNA RESP QL NAA+PROBE: NOT DETECTED

## 2020-08-11 ENCOUNTER — PATIENT OUTREACH (OUTPATIENT)
Dept: ADMINISTRATIVE | Facility: OTHER | Age: 35
End: 2020-08-11

## 2020-08-11 NOTE — PROGRESS NOTES
Requested updates within Care Everywhere.  Patient's chart was reviewed for overdue ANAI topics.  Immunizations reconciled.

## 2020-08-18 ENCOUNTER — OFFICE VISIT (OUTPATIENT)
Dept: GASTROENTEROLOGY | Facility: CLINIC | Age: 35
End: 2020-08-18
Payer: COMMERCIAL

## 2020-08-18 VITALS — HEIGHT: 66 IN | WEIGHT: 208.56 LBS | BODY MASS INDEX: 33.52 KG/M2 | RESPIRATION RATE: 18 BRPM

## 2020-08-18 DIAGNOSIS — K21.9 GASTRO-ESOPHAGEAL REFLUX DISEASE WITHOUT ESOPHAGITIS: Primary | ICD-10-CM

## 2020-08-18 DIAGNOSIS — K22.4 ESOPHAGEAL SPASM: ICD-10-CM

## 2020-08-18 DIAGNOSIS — R10.13 EPIGASTRIC PAIN: ICD-10-CM

## 2020-08-18 DIAGNOSIS — R10.13 DYSPEPSIA: ICD-10-CM

## 2020-08-18 DIAGNOSIS — R14.0 BLOATING: ICD-10-CM

## 2020-08-18 PROCEDURE — 3008F PR BODY MASS INDEX (BMI) DOCUMENTED: ICD-10-PCS | Mod: CPTII,S$GLB,, | Performed by: INTERNAL MEDICINE

## 2020-08-18 PROCEDURE — 99999 PR PBB SHADOW E&M-EST. PATIENT-LVL III: ICD-10-PCS | Mod: PBBFAC,,, | Performed by: INTERNAL MEDICINE

## 2020-08-18 PROCEDURE — 99214 OFFICE O/P EST MOD 30 MIN: CPT | Mod: S$GLB,,, | Performed by: INTERNAL MEDICINE

## 2020-08-18 PROCEDURE — 1125F AMNT PAIN NOTED PAIN PRSNT: CPT | Mod: S$GLB,,, | Performed by: INTERNAL MEDICINE

## 2020-08-18 PROCEDURE — 1125F PR PAIN SEVERITY QUANTIFIED, PAIN PRESENT: ICD-10-PCS | Mod: S$GLB,,, | Performed by: INTERNAL MEDICINE

## 2020-08-18 PROCEDURE — 99999 PR PBB SHADOW E&M-EST. PATIENT-LVL III: CPT | Mod: PBBFAC,,, | Performed by: INTERNAL MEDICINE

## 2020-08-18 PROCEDURE — 99214 PR OFFICE/OUTPT VISIT, EST, LEVL IV, 30-39 MIN: ICD-10-PCS | Mod: S$GLB,,, | Performed by: INTERNAL MEDICINE

## 2020-08-18 PROCEDURE — 3008F BODY MASS INDEX DOCD: CPT | Mod: CPTII,S$GLB,, | Performed by: INTERNAL MEDICINE

## 2020-08-18 RX ORDER — ESOMEPRAZOLE MAGNESIUM 40 MG/1
40 CAPSULE, DELAYED RELEASE ORAL
Qty: 30 CAPSULE | Refills: 11 | Status: SHIPPED | OUTPATIENT
Start: 2020-08-18 | End: 2020-09-28

## 2020-08-18 RX ORDER — METHYLPREDNISOLONE 4 MG/1
TABLET ORAL
COMMUNITY
Start: 2020-05-20 | End: 2021-02-17

## 2020-08-18 NOTE — PROGRESS NOTES
Subjective:       Patient ID: Kaylin Vargas is a 35 y.o. female.    Chief Complaint: No chief complaint on file.    Ms. Vargas returns for f/u after her recent EGD (see below).  She was rx'd omeprazole and prn famotidine, and feels only a little better.   She has the hyoscyamine, and takes 3 when she has the spasm.  But it went from an everyday occurrence to QOD.  But she still gets bloated: after eats --> she bloats. And have some heaviness in her chest.  Denies N/V.        EGD 5/11/2020:  Impression:  - Normal oropharynx.                        - Normal upper third of esophagus and middle third of esophagus.                        - Reflux esophagitis. Biopsied.                        - (The history and examination was suspicious for an esophageal spasm).                        - Z-line regular, 35 cm from the incisors.                        - Patulous lower esophageal sphincter.                        - (Non-erosive esophageal reflux (NERD) disease).                        - Normal gastric fundus and gastric body.                        - Minimal antritis. Biopsied.                        - Normal pylorus.                        - Normal examined duodenum.   Recommendation:      - Discharge patient to home.                        - Await pathology and CLOtest results.                        - Follow an antireflux regimen.                        - Continue present medications.                        - Use Prilosec (omeprazole) 40 mg PO daily.                        - Add Pepcid (famotidine) 40 mg PO nightly.                        - Use sucralfate tablets 1 gram PO QID.                        - Use Levsin, NuLev (hyoscyamine) 0.125 mg 1-2 tabs SL q 4 hours PRN.                        - Call the G.I. clinic in 2 weeks for reports (if you haven't heard from us sooner) [phone].                        - Return to GI clinic in 4-6 weeks.   Ramiro Trejo MD   5/11/2020     1. DISTAL ESOPHAGUS,  BIOPSY:   Squamous mucosa with mild chronic inflammation and reactive changes   No evidence of intestinal metaplasia, dysplasia or malignancy   No evidence of significant eosinophils present   2. STOMACH, BIOPSY:   Gastric body and antral mucosa with active chronic gastritis and reactive changes   No evidence of intestinal metaplasia, dysplasia or malignancy   No evidence of Helicobacter pylori organisms on H&E stain   Immunostain for Helicobacter pylori organisms has been requested and will be reported as a supplemental report  VC   Comment: Interpreted by: Priscilla Justice M.D., Signed on 05/15/2020 at 12:50  Supplemental Diagnosis   Immunostain for Helicobacter pylori organisms, performed with appropriate controls, is negative.           Review of Systems   Constitutional: Negative for appetite change, fever and unexpected weight change.   HENT: Negative.  Negative for mouth sores, sore throat and trouble swallowing.    Eyes: Negative.    Respiratory: Positive for chest tightness. Negative for cough and choking.    Cardiovascular: Negative.  Negative for chest pain and leg swelling.   Gastrointestinal: Positive for abdominal distention (Bloating.). Negative for abdominal pain, anal bleeding, blood in stool, constipation, diarrhea, nausea and vomiting.   Genitourinary: Negative.    Musculoskeletal: Negative.    Integumentary:  Negative for color change and rash. Negative.   Neurological: Negative.    Hematological: Negative for adenopathy.   Psychiatric/Behavioral: Negative.          Objective:      Physical Exam  Vitals signs and nursing note reviewed.   Constitutional:       Appearance: Normal appearance. She is well-developed. She is obese.   HENT:      Head: Normocephalic.      Nose: Nose normal.      Mouth/Throat:      Mouth: Mucous membranes are moist.      Pharynx: No oropharyngeal exudate.   Eyes:      General: No scleral icterus.  Neck:      Thyroid: No thyromegaly.      Trachea: No tracheal deviation.    Cardiovascular:      Rate and Rhythm: Normal rate and regular rhythm.      Heart sounds: Normal heart sounds.   Pulmonary:      Effort: Pulmonary effort is normal.      Breath sounds: Normal breath sounds.   Abdominal:      General: Bowel sounds are normal. There is no distension.      Palpations: Abdomen is soft. There is no mass.      Tenderness: There is abdominal tenderness (Minimally tender epig area.). There is no guarding or rebound.   Lymphadenopathy:      Cervical: No cervical adenopathy.   Skin:     General: Skin is warm and dry.      Findings: No rash.   Neurological:      Mental Status: She is alert and oriented to person, place, and time.   Psychiatric:         Mood and Affect: Mood normal.         Thought Content: Thought content normal.         Assessment:         Gastro-esophageal reflux disease without esophagitis    Epigastric pain    Dyspepsia    Bloating    Esophageal spasm    Other orders  -     Discontinue: esomeprazole (NEXIUM) 40 MG capsule; Take 1 capsule (40 mg total) by mouth before breakfast. (Patient not taking: Reported on 9/28/2020)  Dispense: 30 capsule; Refill: 11      Plan:        1. D/C omep.  Trial of Nexium 40 mg.   2. Sched for Tosrten Empty Study.

## 2020-08-19 ENCOUNTER — TELEPHONE (OUTPATIENT)
Dept: GASTROENTEROLOGY | Facility: CLINIC | Age: 35
End: 2020-08-19

## 2020-08-19 ENCOUNTER — PATIENT MESSAGE (OUTPATIENT)
Dept: GASTROENTEROLOGY | Facility: CLINIC | Age: 35
End: 2020-08-19

## 2020-08-19 NOTE — TELEPHONE ENCOUNTER
----- Message from Carine Alexander sent at 8/19/2020 11:00 AM CDT -----  Regarding: Pt Message  Type: Needs Medical Advice  Who Called: Patient   Best Call Back Number: [phone]  Additional Information: Patient is requesting a call back in regards to needing an order faxed to [facility]. [facility] [phone]   Please and Thank you.

## 2020-08-20 DIAGNOSIS — K21.9 GASTROESOPHAGEAL REFLUX DISEASE, ESOPHAGITIS PRESENCE NOT SPECIFIED: Primary | ICD-10-CM

## 2020-08-31 ENCOUNTER — HOSPITAL ENCOUNTER (OUTPATIENT)
Dept: RADIOLOGY | Facility: HOSPITAL | Age: 35
Discharge: HOME | End: 2020-08-31
Attending: INTERNAL MEDICINE
Payer: COMMERCIAL

## 2020-08-31 DIAGNOSIS — K21.9 GASTROESOPHAGEAL REFLUX DISEASE, ESOPHAGITIS PRESENCE NOT SPECIFIED: ICD-10-CM

## 2020-08-31 PROCEDURE — A9541 TC99M SULFUR COLLOID: HCPCS

## 2020-08-31 PROCEDURE — 78264 GASTRIC EMPTYING IMG STUDY: CPT | Mod: TC

## 2020-08-31 PROCEDURE — 78264 GASTRIC EMPTYING IMG STUDY: CPT | Mod: 26,,, | Performed by: RADIOLOGY

## 2020-08-31 PROCEDURE — 78264 NM GASTRIC EMPTYING: ICD-10-PCS | Mod: 26,,, | Performed by: RADIOLOGY

## 2020-09-02 ENCOUNTER — TELEPHONE (OUTPATIENT)
Dept: ENDOSCOPY | Facility: HOSPITAL | Age: 35
End: 2020-09-02

## 2020-09-02 DIAGNOSIS — K21.9 GASTROESOPHAGEAL REFLUX DISEASE, ESOPHAGITIS PRESENCE NOT SPECIFIED: Primary | ICD-10-CM

## 2020-09-27 ENCOUNTER — PATIENT OUTREACH (OUTPATIENT)
Dept: ADMINISTRATIVE | Facility: OTHER | Age: 35
End: 2020-09-27

## 2020-09-27 NOTE — PROGRESS NOTES
LINKS immunization registry updated  Care Everywhere updated  Health Maintenance updated  Chart reviewed for overdue Proactive [facility] Encounters (ANAI) health maintenance testing (CRS, Breast Ca, Diabetic Eye Exam)   Orders entered:N/A

## 2020-09-28 ENCOUNTER — OFFICE VISIT (OUTPATIENT)
Dept: GASTROENTEROLOGY | Facility: CLINIC | Age: 35
End: 2020-09-28
Payer: COMMERCIAL

## 2020-09-28 ENCOUNTER — LAB VISIT (OUTPATIENT)
Dept: LAB | Facility: HOSPITAL | Age: 35
End: 2020-09-28
Payer: COMMERCIAL

## 2020-09-28 VITALS
BODY MASS INDEX: 33.13 KG/M2 | DIASTOLIC BLOOD PRESSURE: 84 MMHG | HEART RATE: 93 BPM | SYSTOLIC BLOOD PRESSURE: 122 MMHG | HEIGHT: 66 IN | WEIGHT: 206.13 LBS

## 2020-09-28 DIAGNOSIS — R14.3 FLATULENCE: ICD-10-CM

## 2020-09-28 DIAGNOSIS — R14.2 BELCHING: ICD-10-CM

## 2020-09-28 DIAGNOSIS — K21.9 GASTROESOPHAGEAL REFLUX DISEASE, ESOPHAGITIS PRESENCE NOT SPECIFIED: ICD-10-CM

## 2020-09-28 DIAGNOSIS — R10.13 EPIGASTRIC PAIN: ICD-10-CM

## 2020-09-28 DIAGNOSIS — K21.9 GASTROESOPHAGEAL REFLUX DISEASE, ESOPHAGITIS PRESENCE NOT SPECIFIED: Primary | ICD-10-CM

## 2020-09-28 DIAGNOSIS — R09.A2 GLOBUS SENSATION: ICD-10-CM

## 2020-09-28 LAB — IGA SERPL-MCNC: 317 MG/DL (ref 40–350)

## 2020-09-28 PROCEDURE — 3008F BODY MASS INDEX DOCD: CPT | Mod: CPTII,S$GLB,, | Performed by: FAMILY MEDICINE

## 2020-09-28 PROCEDURE — 82784 ASSAY IGA/IGD/IGG/IGM EACH: CPT

## 2020-09-28 PROCEDURE — 83516 IMMUNOASSAY NONANTIBODY: CPT

## 2020-09-28 PROCEDURE — 86677 HELICOBACTER PYLORI ANTIBODY: CPT

## 2020-09-28 PROCEDURE — 3008F PR BODY MASS INDEX (BMI) DOCUMENTED: ICD-10-PCS | Mod: CPTII,S$GLB,, | Performed by: FAMILY MEDICINE

## 2020-09-28 PROCEDURE — 99214 PR OFFICE/OUTPT VISIT, EST, LEVL IV, 30-39 MIN: ICD-10-PCS | Mod: S$GLB,,, | Performed by: FAMILY MEDICINE

## 2020-09-28 PROCEDURE — 99999 PR PBB SHADOW E&M-EST. PATIENT-LVL IV: CPT | Mod: PBBFAC,,, | Performed by: FAMILY MEDICINE

## 2020-09-28 PROCEDURE — 36415 COLL VENOUS BLD VENIPUNCTURE: CPT

## 2020-09-28 PROCEDURE — 99214 OFFICE O/P EST MOD 30 MIN: CPT | Mod: S$GLB,,, | Performed by: FAMILY MEDICINE

## 2020-09-28 PROCEDURE — 99999 PR PBB SHADOW E&M-EST. PATIENT-LVL IV: ICD-10-PCS | Mod: PBBFAC,,, | Performed by: FAMILY MEDICINE

## 2020-09-28 RX ORDER — OMEPRAZOLE 40 MG/1
40 CAPSULE, DELAYED RELEASE ORAL
Qty: 60 CAPSULE | Refills: 1 | Status: SHIPPED | OUTPATIENT
Start: 2020-09-28 | End: 2022-07-21

## 2020-09-28 NOTE — PATIENT INSTRUCTIONS
· Start Prilosec 40 mg twice daily, 30-45 min before meals on an empty stomach  · Continue Famotidine (Pepcid) nightly after dinner  · Continue Gaviscon as needed.  · Send me a message if you feel you need to add back the Carafate (pink chalky tab taken before meals).  · Schedule your Esophagram.  · Schedule lab work today.        [email or web address]  Dropping Acid The Reflux Diet Cookbook and Cure -  Casimiro Samuels M.D.    GERD  Worst Foods for Acid Reflux  Chocolate (milk chocolate worse than dark chocolate)  Soda (all carbonated beverages)  Alcohol (beer, liquor, wine)  Fried foods  Campos, sausage, ribs  Cream sauce  Fatty meats (beef)  Butter, margarine, lard, shortening  Coffee, tea  Mint   High fat nuts  Hot sauces and pepper  Citrus fruit/juices      Acidic foods (pH - 1 is MORE acidic, 5 is LESS acidic)     Do not eat or drink these (lower numbers are worse)    Induction diet - For 2 weeks eat nothing below pH 5     Lemon juice 2.3  Grape cranberry juice 2.5  Stomach Acid 2.5  Gelatin Dessert 2.6  Lemon/lime 2.9/2.7  Vinegar 2.9  Gatorade 3.0  Fruits - plums, apricots, strawberries, cherries 3.0  Vitamin C (ascorbic acid) 3.0  Iced tea, Snapple 3.1  Mustard 3.2  Soft drinks 3.3  Nectarines 3.3  Pomegranate 3.3  Applesauce 3.4  Grapefruit 3.4  Kiwi 3.4  Barbecue sauce 3.4  Caesar dressing 3.5  Thousand island dressing 3.6  Strawberries 3.5  Pineapple juice 3.5  Beer 3.5  Wine 3.5  Grape 3.6  Apples 3.6  Pineapple 3.7  Pickle 3.7  Blackberries, blueberries 3.7  Dashawn 3.7  Orange 3.8  Cherries 3.9  Red Bull 3.9  Tomatoes 4.2  Coffee 5.1      These are Safe foods:  Agave  Aloe Vera  Apple (only red)  Bagels  Banana (worsens reflux in 1%)  Beans - black, red, lima, lentils  Bread - whole grain, rye  Caramel  Celery  Chamomile tea  Chicken - skinless, never fried  Chicken stock or bouillon  Coffee - one cup/day with milk  Fennel  Fish  Ginger  Green vegetables (no green  peppers)  Herbs  Honey  Melon  Milk - skin, soy, or Lactaid skim milk  Mushrooms  Oatmeal  Olive oil  Parsley  Pasta  Pears  Popcorn  Potatoes  Red bell peppers  Rice  Soups  Tofu  Turkey Breast  Turnip  Vegetables - no onion, tomatoes, peppers  Vinaigrette  Water - non carbonated  Whole grain breads, crackers, breakfast cereals      Best Foods for Acid Reflux  Whole grain breads  Oatmeal  Aloe Vera  Salad (no tomatoes, onions, cheese, or high fat dressing)  Banana  Melon  Fennel  Chicken and turkey (skinless, never fried)  Fish/seafood (never fried)  Celery  Parsley  Couscous and Rice    Maybe bad foods (Everyone is unique)  Tomatoes  Garlic  Onion  Nuts (macadamia nuts)  Apples (especially green)  Cucumber  Green peppers  Spicy food  Some herbal teas    GERD tips  Change what you eat:  Eat smaller meals  Eat slowly and chew thoroughly until food is almost liquid  Cut down on junk carbohydrates such as sugar and white flour  Use herbs in your cooking  Eat more raw foods (more than 10 ingredients is not a raw food)  Avoid trans fats and partially hydrogenated oils  Eat more fish and switch to grass fed beef  Switch your cooking oil to macadamia nut or olive oil  Watch extremes of salt intake (too high or too low is bad)    If just cutting out acidic foods is not enough, change how you eat:  Large breakfast, medium lunch, light dinner  Dont mix fruit juices, sweet fruits, and refined starches with meats and heavy food  Dont wash your food down with a lot of liquid      Change these habits:  Stop smoking  Eat dinner earlier (3-4 hours before lying down to sleep)  Elevate the head of your bed 6 inches (blocks under the head of the bed are better than pillows) OR [employer] sells a special Ripl Reflux relief system  That may be purchased online for a comfortable, individual solution for raising the head of the bed.  Exercise (but wait 2 hours after eating)  Drink more water (between meals)    Take these  supplements:  Multi vitamin  Probiotic  Fish oil    Most common food allergens: milk, eggs, peanuts, tree nuts, fish, shellfish, wheat, and soy    All natural immediate relief:  Chew 2-3 soft probiotic capsules - Dr. Tinajero's Probiotics 12 Plus  Chew chewable DGL licorice tablet  Chew papaya tablet with high protein meal - [employer]  Drink 2 ounces of aloe vera juice  Swedish bitters  Prelief- reduce the acid in food to keep it form burning sensitive tissue  Iberogast  [place]ery [place]m  Drink Chamomile Tea  Teaspoon of baking soda in water  Spoonful of vinegar in water      All natural ulcer healers:  Zinc carnosine - 75.5 mg with food twice a day x 8 weeks   Angy by Ezra - $8 for 60 pills  DGL (deglycyrrhizinated licorice) - 2 tablets before meals. Heals stomach lining   Natural Factors brand, Enzymatic therapy brand.  Aloe Vera juice  - 2 to 8 ounces a day   [place] or [place]                Low Fodmap Diet     Fodmaps (fructo,oligo,mono saccharides and polyols) are contained in certain foods and can cause significant bloating in some people. Reducing these foods can reduce bloating. Start off by cutting out anything with high fructose corn syrup in the ingredients.                                                   Foods suitable on a low-fodmap diet  fruit  banana, blueberry, boysenberry, canteloupe, cranberry, durian, grape,  grapefruit, honeydew melon, kiwifruit, lemon,lime, mandarin, orange,  passionfruit, pawpaw, raspberry, rhubarb, rockmelon, star anise,  strawberry, tangelo  vegetables  alfalfa, artichoke, bamboo shoots, bean shoots, bok madyson,  carrot, celery, choko, madyson sum, endive, luzma, green beans,  lettuce, olives, parsnip, potato, pumpkin, red capsicum (bell pepper),  silver beet, spinach, summer squash (yellow), swede, sweet potato, taro, tomato,  turnip, yam, zucchini   herbs  basil, chili, coriander, luzma, lemongrass,   marjoram, mint, oregano, parsley, rosemary,  thyme  milk  lactose-free milk, oat milk*, rice milk, soy milk*  cheeses  hard cheeses, and brie and camembert  yoghurt  lactose-free varieties  ice-cream substitutes  gelati, sorbet  butter substitutes  olive oilgrain foods  cereals  gluten-free bread or cereal products  bread  100% spelt bread  Rice, oats, polenta, other  arrowroot, millet, psyllium, quinoa, sorgum, tapioca  sweeteners  sugar* (sucrose), glucose, artificial sweeteners not ending in -ol  honey substitutes  mcconnell syrup*, maple syrup*, molasses, treacle  *small quantities  *check for additives  Note: if fruit is dried, eat in small quantities     excess fructose lactose fructan  s galactans polyols                                                  Eliminate foods containing fodmaps  fruit  apple, apricot, avocado, blackberry, cherry, lychee, nashi, nectarine, peach, pear, plum,  prune, watermelon, clarence, tinned fruit in natural juice,   sweeteners  sorbitol (420)  mannitol (421)  isomalt (953)  maltitol (965)  xylitol (967)  legumes  baked beans, chickpeas, kidney beans, lentils  vegetables  asparagus, beetroot, broccoli, brussels sprouts, cabbage, eggplant, fennel, garlic,  vi, okra, onion (all), shallots, spring onion, cauliflower, green capsicum (bell pepper), mushroom, sweet corn  cereals  wheat and rye, in large amounts eg. Bread, crackers, cookies, couscous, pasta  fruit  custard apple, persimmon, watermelon  miscellaneous  chicory, dandelion, inulin  sweeteners  fructose, high fructose corn syrup  large total fructose dose  concentrated fruit sources, large servings of fruit, dried fruit, fruit juice  honey  corn syrup, fruisana  milk  milk from cows, goats or sheep, custard, ice cream, yoghurt  cheeses  soft unripened cheeses eg. cottage, cream, mascarpone     Additional FODMAPs Diet Reading List     IBS-Free At Last!: Second Edition: Change Your Carbs, Change Your Life with the FODMAP Elimination Diet, 2 nd edition by Cece Villegas MS,  LISA     2012 copyright; [employer].   ISBN: 654-2-0500449-2-1     The Complete Idiots Guide To Living Well With IBS by Elsi Saleh RD, LDN  [facility], 2010     The FODMAPs Approach:--Minimize Consumption of Fermentable Carbs to Manage Functional Gut Disorder Symptoms  by Elsi Saleh RD, LDN article found in  Todays Dietitian, vol. 12, no. 8, p. 30     The Inside Tract: Your Good Gut Guide To Great Digestive Health by Neftaly Sahni MD and Kristina Campuzano, MS, RD, LDN  2011 copyright, [employer]   ISBN: 863-5-68757-264-9     List assembled by: Radha Melton, MS, RD, LDN, CDE [facility]  Last Updated: 7/13/12

## 2020-09-28 NOTE — PROGRESS NOTES
[facility] Consultation Note    Reason for Consult:  The primary encounter diagnosis was Gastroesophageal reflux disease, esophagitis presence not specified. Diagnoses of Epigastric pain, Globus sensation, Belching, and Flatulence were also pertinent to this visit.    PCP:   Rima Madrigal       Referring MD:  No referring provider defined for this encounter.    HPI:  This is a 35 y.o. female here for evaluation of GERD and chest pressure. She has been seen previously by Dr Trejo, Dr. Chin, and MARISSA Kebede for these complaints. She reports she would like to transfer care to this clinic as it is closer to her place of work and home.    Reports she wakes up every day with chest heaviness and burning sensation to retrosternal area and epigastric area. Currently taking prilosec and famotidine at night after dinner.     Treatments tried:  Prilosec - relief with this at night  Nexium - caused increased bloating and cramping  Pantoprazole - no improvement  Famotidine - relief with this at night  Hyoscyamine - some relief  Sucralfate - relief with these before meals  Gaviscon meal sachets  - some relief after meals;  Marcia root - some relief    +throat clearing  Constantly feels bloated  No nausea, no vomiting, no lower abd discomfort  Reflux - no  Dysphagia - occasional feeling like foods don't go down easily  Regurgitation - occasional  Bowel Habits - usually once daily, but recently has changed to one BM every 2-3 days  Rectal Bleeding/Melena- no  NSAIDs - none  Fam Hx - Mat Aunt - intestinal cancer; Sister - crohn's Disease (30); no Celiac    ROS:  Constitutional: No fevers, chills, + unintentional weight loss, scared to eat  ENT: + allergies, +PND  CV: + chest pain/discomfort  Pulm: + cough, + shortness of breath  Ophtho: No vision changes  GI: see HPI  Derm: No rash  Heme: No lymphadenopathy, No bruising  MSK: No arthritis  : No dysuria, No hematuria  Endo: No hot or cold  intolerance  Neuro: No syncope, No seizure  Psych: No anxiety, No depression, +feels hopeless regarding this current situation;     Medical History:  has a past medical history of Acne, Anxiety disorder, unspecified (4/30/2020), Gastro-esophageal reflux disease without esophagitis (4/30/2020), and Obesity.    Surgical History:  has a past surgical history that includes Esophagogastroduodenoscopy (N/A, 5/11/2020).    Family History: family history includes Aneurysm in her maternal grandmother; Crohn's disease in her sister; Diabetes in her father; Heart disease in her maternal grandfather, mother, and paternal grandmother; Hypertension in her father and mother; Kidney disease in her mother..     Social History:  reports that she has never smoked. She has never used smokeless tobacco. She reports current alcohol use of about 2.0 standard drinks of alcohol per week. She reports that she does not use drugs.    Review of patient's allergies indicates:  No Known Allergies    Current Outpatient Medications on File Prior to Visit   Medication Sig Dispense Refill    aluminum-magnesium hydroxide-simethicone (MAALOX) 200-200-20 mg/5 mL Susp Take 30 mLs by mouth 3 (three) times daily as needed. 354 mL 1    famotidine (PEPCID) 40 MG tablet Take 1 tablet (40 mg total) by mouth every evening. 60 tablet 5    GINGER ROOT-PYRIDOXINE HCL,B6, ORAL Take by mouth.      hyoscyamine (LEVSIN/SL) 0.125 mg Subl Place 1 OR 2 tablets  under the tongue every 3-4 (four) hours as needed. 20 tablet 11    levocetirizine (XYZAL) 5 MG tablet Take 1 tablet (5 mg total) by mouth every evening. 30 tablet 0    mag carb/aluminum hydrox/algin (GAVISCON EXTRA STRENGTH ORAL) Take by mouth.      sucralfate (CARAFATE) 1 gram tablet Take 1 tablet (1 g total) by mouth 4 (four) times daily before meals and nightly. 120 tablet 11    methylPREDNISolone (MEDROL DOSEPACK) 4 mg tablet TAKE BY MOUTH AS DIRECTED ON INSIDE OF PACKAGE      [DISCONTINUED]  "esomeprazole (NEXIUM) 40 MG capsule Take 1 capsule (40 mg total) by mouth before breakfast. (Patient not taking: Reported on 9/28/2020) 30 capsule 11     No current facility-administered medications on file prior to visit.          Objective Findings:    Vital Signs Reviewed:  /84   Pulse 93   Ht 5' 6" (1.676 m)   Wt 93.5 kg (206 lb 2.1 oz)   BMI 33.27 kg/m²   Body mass index is 33.27 kg/m².    Physical Exam:  General Appearance: Well appearing in no acute distress  Head:   Normocephalic, without obvious abnormality  Eyes:    No scleral icterus  ENT: Neck supple  Lungs: CTA bilaterally in anterior and posterior fields, no wheezes, no crackles.  Heart:  Regular rate and rhythm, S1, S2 normal, no murmurs heard  Abdomen: Soft, + epigastric tenderness, non distended with positive bowel sounds in all four quadrants. No hepatosplenomegaly, ascites, or mass  Extremities: No edema  Skin: No rash  Neurologic: AAO x 3      Labs Reviewed:  Lab Results   Component Value Date    WBC 10.97 05/07/2020    HGB 12.0 05/07/2020    HCT 37.3 05/07/2020     05/07/2020    CHOL 134 07/26/2018    TRIG 58 07/26/2018    HDL 41 07/26/2018    ALT 15 05/02/2020    AST 16 05/02/2020     05/02/2020    K 3.4 (L) 05/02/2020     (L) 05/02/2020    CREATININE 0.9 05/02/2020    BUN 8 05/02/2020    CO2 22 (L) 05/02/2020    TSH 1.580 02/22/2016    HGBA1C 5.2 07/26/2018       Imaging reviewed:  8/31/2020 Gastric Emptying Study for GERD  1. At 182 minutes, percentage of retention was 1 % (normal retention at 4 hours is 10% and lower).    5/5/2020 US Abd complete for GERD  1.  Findings consistent with hepatic steatosis noting some sparing of the gallbladder fossa.  2.  A 3 mm echogenic focus at the gallbladder wall possibly representing adherent stone or polyp.  3.  Few small biliary crystals without evidence of acute cholecystitis    Endoscopy reviewed:  5/11/2020 EGD w/ Dr Trejo for Epigastric abd pain, non-cardiac chest pain, " and suspected reflux  1. Normal oropharynx.   2. Normal upper third of esophagus and middle third of esophagus.   3. Reflux esophagitis. Biopsied. Mild chronic inflammation and reactive changes. No intestinal metaplasia, dysplasia, or malignancy. No eosinophils.  4. Patulous lower esophageal sphincter.   5. Non-erosive esophageal reflux (NERD) disease   6. Normal gastric fundus and gastric body.   7. Minimal antritis. Biopsied. H pylori negative. No intestinal metaplasia, dysplasia, or malignancy. Active gastritis.  8. Normal pylorus.   9. Normal examined duodenum.       35 y.o. here for evaluation of:    Assessment:  1. Gastroesophageal reflux disease, esophagitis presence not specified    2. Epigastric pain    3. Globus sensation    4. Belching    5. Flatulence         On and off several PPIs for short periods of time. Prilosec has worked the best. Will trial BID x 8 weeks. Continue famotidine nightly. Continue using gaviscon PRN after meals. She will see how this works and if she feels she needs to add carafate, we can. She will proceed with Esophagram as ordered by Dr Trejo. I did message him discussing her transfer of care to our clinic. We will check h pylori serology and Celiac labs to be thorough as she is very concerned that her symptoms haven't changed much. We briefly discussed EGD with BRAVO study as a future option to determine if this is truly acid reflux. GERD tips and low FODMAP tips provided in AVS.    Recommendations:  1. Prilosec 40 mg BID x 8 weeks  2. Famotidine nightly.  3. Gaviscon PRN  4. Schedule Esophagram  5. Labs below.  6. GERD Tips and Low FODMAP in AVS    F/U in 8 weeks and I will f/u with lab results.      Order summary:  Orders Placed This Encounter    H. PYLORI ANTIBODY, IGG    TISSUE TRANSGLUTAMINASE (TTG), IGA    IgA    omeprazole (PRILOSEC) 40 MG capsule         Thank you so much for allowing me to participate in the care of Kaylin Bay,  FNP-C

## 2020-10-01 ENCOUNTER — HOSPITAL ENCOUNTER (OUTPATIENT)
Dept: RADIOLOGY | Facility: HOSPITAL | Age: 35
Discharge: HOME | End: 2020-10-01
Attending: INTERNAL MEDICINE
Payer: COMMERCIAL

## 2020-10-01 ENCOUNTER — TELEPHONE (OUTPATIENT)
Dept: GASTROENTEROLOGY | Facility: CLINIC | Age: 35
End: 2020-10-01

## 2020-10-01 DIAGNOSIS — K21.9 GASTROESOPHAGEAL REFLUX DISEASE: ICD-10-CM

## 2020-10-01 LAB
H PYLORI IGG SERPL QL IA: NEGATIVE
TTG IGA SER-ACNC: 5 UNITS

## 2020-10-01 PROCEDURE — 74220 X-RAY XM ESOPHAGUS 1CNTRST: CPT | Mod: 26,,, | Performed by: RADIOLOGY

## 2020-10-01 PROCEDURE — 74220 X-RAY XM ESOPHAGUS 1CNTRST: CPT | Mod: TC

## 2020-10-01 PROCEDURE — 25500020 PHARM REV CODE 255: Performed by: INTERNAL MEDICINE

## 2020-10-01 PROCEDURE — A9698 NON-RAD CONTRAST MATERIALNOC: HCPCS | Performed by: INTERNAL MEDICINE

## 2020-10-01 PROCEDURE — 74220 FL ESOPHAGRAM COMPLETE: ICD-10-PCS | Mod: 26,,, | Performed by: RADIOLOGY

## 2020-10-01 RX ADMIN — BARIUM SULFATE 150 ML: 0.6 SUSPENSION ORAL at 03:10

## 2020-10-01 NOTE — TELEPHONE ENCOUNTER
MA informed pt per Maria Esther message below.   Pt verbalize understanding and thank MA     ----- Message from Maria Esther Bay DNP sent at 10/1/2020  1:16 PM CDT -----  H. Pylori and celiac labs were negative.

## 2020-10-02 ENCOUNTER — CLINICAL SUPPORT (OUTPATIENT)
Dept: OTHER | Facility: CLINIC | Age: 35
End: 2020-10-02
Payer: COMMERCIAL

## 2020-10-02 ENCOUNTER — TELEPHONE (OUTPATIENT)
Dept: GASTROENTEROLOGY | Facility: CLINIC | Age: 35
End: 2020-10-02

## 2020-10-02 DIAGNOSIS — Z00.8 ENCOUNTER FOR OTHER GENERAL EXAMINATION: ICD-10-CM

## 2020-10-02 NOTE — TELEPHONE ENCOUNTER
MA contacted to give results per Cassidy , esophagram was normal , no reflux activity noted.             ----- Message from Maria Esther Bay DNP sent at 10/2/2020  1:39 PM CDT -----  Esophagram was normal - no reflux activity noted.

## 2020-10-06 VITALS — HEIGHT: 66 IN | BODY MASS INDEX: 33.27 KG/M2

## 2020-10-06 LAB
GLUCOSE SERPL-MCNC: 82 MG/DL (ref 60–140)
HDLC SERPL-MCNC: 49 MG/DL
POC CHOLESTEROL, LDL (DOCK): 62 MG/DL
POC CHOLESTEROL, TOTAL: 126 MG/DL
TRIGL SERPL-MCNC: 74 MG/DL

## 2020-11-16 ENCOUNTER — PATIENT OUTREACH (OUTPATIENT)
Dept: ADMINISTRATIVE | Facility: OTHER | Age: 35
End: 2020-11-16

## 2020-11-16 NOTE — PROGRESS NOTES
LINKS immunization registry not responding  Care Everywhere updated  Health Maintenance updated  Chart reviewed for overdue Proactive [facility] Encounters (ANAI) health maintenance testing (CRS, Breast Ca, Diabetic Eye Exam)   Orders entered:N/A

## 2020-12-26 ENCOUNTER — IMMUNIZATION (OUTPATIENT)
Dept: INTERNAL MEDICINE | Facility: CLINIC | Age: 35
End: 2020-12-26
Payer: COMMERCIAL

## 2020-12-26 DIAGNOSIS — Z23 NEED FOR VACCINATION: ICD-10-CM

## 2020-12-26 PROCEDURE — 91300 COVID-19, MRNA, LNP-S, PF, 30 MCG/0.3 ML DOSE VACCINE: CPT | Mod: ,,, | Performed by: INTERNAL MEDICINE

## 2020-12-26 PROCEDURE — 0001A COVID-19, MRNA, LNP-S, PF, 30 MCG/0.3 ML DOSE VACCINE: ICD-10-PCS | Mod: CV19,,, | Performed by: INTERNAL MEDICINE

## 2020-12-26 PROCEDURE — 91300 COVID-19, MRNA, LNP-S, PF, 30 MCG/0.3 ML DOSE VACCINE: ICD-10-PCS | Mod: ,,, | Performed by: INTERNAL MEDICINE

## 2020-12-26 PROCEDURE — 0001A COVID-19, MRNA, LNP-S, PF, 30 MCG/0.3 ML DOSE VACCINE: CPT | Mod: CV19,,, | Performed by: INTERNAL MEDICINE

## 2021-01-10 ENCOUNTER — PATIENT OUTREACH (OUTPATIENT)
Dept: ADMINISTRATIVE | Facility: OTHER | Age: 36
End: 2021-01-10

## 2021-01-16 ENCOUNTER — IMMUNIZATION (OUTPATIENT)
Dept: INTERNAL MEDICINE | Facility: CLINIC | Age: 36
End: 2021-01-16
Payer: COMMERCIAL

## 2021-01-16 DIAGNOSIS — Z23 NEED FOR VACCINATION: Primary | ICD-10-CM

## 2021-01-16 PROCEDURE — 91300 COVID-19, MRNA, LNP-S, PF, 30 MCG/0.3 ML DOSE VACCINE: CPT | Mod: PBBFAC | Performed by: INTERNAL MEDICINE

## 2021-01-16 PROCEDURE — 0002A COVID-19, MRNA, LNP-S, PF, 30 MCG/0.3 ML DOSE VACCINE: CPT | Mod: PBBFAC | Performed by: INTERNAL MEDICINE

## 2021-02-10 ENCOUNTER — PATIENT OUTREACH (OUTPATIENT)
Dept: ADMINISTRATIVE | Facility: OTHER | Age: 36
End: 2021-02-10

## 2021-02-10 ENCOUNTER — OFFICE VISIT (OUTPATIENT)
Dept: OBSTETRICS AND GYNECOLOGY | Facility: CLINIC | Age: 36
End: 2021-02-10
Attending: OBSTETRICS & GYNECOLOGY
Payer: COMMERCIAL

## 2021-02-10 ENCOUNTER — LAB VISIT (OUTPATIENT)
Dept: LAB | Facility: OTHER | Age: 36
End: 2021-02-10
Attending: OBSTETRICS & GYNECOLOGY
Payer: COMMERCIAL

## 2021-02-10 VITALS
BODY MASS INDEX: 37.03 KG/M2 | DIASTOLIC BLOOD PRESSURE: 80 MMHG | HEIGHT: 66 IN | SYSTOLIC BLOOD PRESSURE: 130 MMHG | WEIGHT: 230.38 LBS

## 2021-02-10 DIAGNOSIS — Z01.419 WELL FEMALE EXAM WITH ROUTINE GYNECOLOGICAL EXAM: Primary | ICD-10-CM

## 2021-02-10 DIAGNOSIS — Z78.9 ATTEMPTING TO CONCEIVE: ICD-10-CM

## 2021-02-10 DIAGNOSIS — Z01.419 WELL FEMALE EXAM WITH ROUTINE GYNECOLOGICAL EXAM: ICD-10-CM

## 2021-02-10 LAB
B-HCG UR QL: NEGATIVE
CTP QC/QA: YES

## 2021-02-10 PROCEDURE — 36415 COLL VENOUS BLD VENIPUNCTURE: CPT

## 2021-02-10 PROCEDURE — 88175 CYTOPATH C/V AUTO FLUID REDO: CPT

## 2021-02-10 PROCEDURE — 99395 PR PREVENTIVE VISIT,EST,18-39: ICD-10-PCS | Mod: S$GLB,,, | Performed by: OBSTETRICS & GYNECOLOGY

## 2021-02-10 PROCEDURE — 99999 PR PBB SHADOW E&M-EST. PATIENT-LVL III: CPT | Mod: PBBFAC,,, | Performed by: OBSTETRICS & GYNECOLOGY

## 2021-02-10 PROCEDURE — 83520 IMMUNOASSAY QUANT NOS NONAB: CPT

## 2021-02-10 PROCEDURE — 1126F AMNT PAIN NOTED NONE PRSNT: CPT | Mod: S$GLB,,, | Performed by: OBSTETRICS & GYNECOLOGY

## 2021-02-10 PROCEDURE — 1126F PR PAIN SEVERITY QUANTIFIED, NO PAIN PRESENT: ICD-10-PCS | Mod: S$GLB,,, | Performed by: OBSTETRICS & GYNECOLOGY

## 2021-02-10 PROCEDURE — 99999 PR PBB SHADOW E&M-EST. PATIENT-LVL III: ICD-10-PCS | Mod: PBBFAC,,, | Performed by: OBSTETRICS & GYNECOLOGY

## 2021-02-10 PROCEDURE — 3008F BODY MASS INDEX DOCD: CPT | Mod: CPTII,S$GLB,, | Performed by: OBSTETRICS & GYNECOLOGY

## 2021-02-10 PROCEDURE — 3008F PR BODY MASS INDEX (BMI) DOCUMENTED: ICD-10-PCS | Mod: CPTII,S$GLB,, | Performed by: OBSTETRICS & GYNECOLOGY

## 2021-02-10 PROCEDURE — 99395 PREV VISIT EST AGE 18-39: CPT | Mod: S$GLB,,, | Performed by: OBSTETRICS & GYNECOLOGY

## 2021-02-12 LAB — MIS SERPL-MCNC: 1.6 NG/ML (ref 0.15–7.5)

## 2021-02-15 PROBLEM — R06.02 SOB (SHORTNESS OF BREATH): Status: RESOLVED | Noted: 2020-06-24 | Resolved: 2021-02-15

## 2021-02-15 PROBLEM — B34.9 ACUTE VIRAL SYNDROME: Status: RESOLVED | Noted: 2019-05-26 | Resolved: 2021-02-15

## 2021-02-15 PROBLEM — U07.1 COVID-19 VIRUS INFECTION: Status: RESOLVED | Noted: 2020-06-24 | Resolved: 2021-02-15

## 2021-02-24 LAB
FINAL PATHOLOGIC DIAGNOSIS: NORMAL
Lab: NORMAL

## 2021-03-02 ENCOUNTER — CLINICAL SUPPORT (OUTPATIENT)
Dept: OTHER | Facility: CLINIC | Age: 36
End: 2021-03-02
Payer: COMMERCIAL

## 2021-03-02 DIAGNOSIS — Z00.8 ENCOUNTER FOR OTHER GENERAL EXAMINATION: ICD-10-CM

## 2021-03-03 VITALS — HEIGHT: 66 IN | BODY MASS INDEX: 37.18 KG/M2

## 2021-03-03 LAB
HDLC SERPL-MCNC: 49 MG/DL
POC CHOLESTEROL, LDL (DOCK): 69 MG/DL
POC CHOLESTEROL, TOTAL: 141 MG/DL
POC GLUCOSE, FASTING: 103 MG/DL (ref 60–110)
TRIGL SERPL-MCNC: 116 MG/DL

## 2021-07-05 ENCOUNTER — HOSPITAL ENCOUNTER (EMERGENCY)
Facility: HOSPITAL | Age: 36
Discharge: HOME | End: 2021-07-05
Attending: INTERNAL MEDICINE
Payer: COMMERCIAL

## 2021-07-05 VITALS
DIASTOLIC BLOOD PRESSURE: 86 MMHG | BODY MASS INDEX: 36.96 KG/M2 | TEMPERATURE: 99 F | RESPIRATION RATE: 20 BRPM | SYSTOLIC BLOOD PRESSURE: 138 MMHG | HEIGHT: 66 IN | WEIGHT: 230 LBS | OXYGEN SATURATION: 100 % | HEART RATE: 86 BPM

## 2021-07-05 DIAGNOSIS — S90.32XA CONTUSION OF LEFT FOOT, INITIAL ENCOUNTER: Primary | ICD-10-CM

## 2021-07-05 DIAGNOSIS — M79.672 LEFT FOOT PAIN: ICD-10-CM

## 2021-07-05 LAB
B-HCG UR QL: NEGATIVE
CTP QC/QA: YES

## 2021-07-05 PROCEDURE — 25000003 PHARM REV CODE 250: Mod: ER | Performed by: INTERNAL MEDICINE

## 2021-07-05 PROCEDURE — 99283 EMERGENCY DEPT VISIT LOW MDM: CPT | Mod: 25,ER

## 2021-07-05 PROCEDURE — 81025 URINE PREGNANCY TEST: CPT | Mod: ER | Performed by: PHYSICIAN ASSISTANT

## 2021-07-05 RX ORDER — IBUPROFEN 800 MG/1
800 TABLET ORAL EVERY 8 HOURS PRN
Qty: 30 TABLET | Refills: 0 | Status: SHIPPED | OUTPATIENT
Start: 2021-07-05 | End: 2022-07-21

## 2021-07-05 RX ORDER — IBUPROFEN 400 MG/1
800 TABLET ORAL
Status: COMPLETED | OUTPATIENT
Start: 2021-07-05 | End: 2021-07-05

## 2021-07-05 RX ADMIN — IBUPROFEN 800 MG: 400 TABLET ORAL at 01:07

## 2021-07-06 ENCOUNTER — PATIENT MESSAGE (OUTPATIENT)
Dept: ADMINISTRATIVE | Facility: HOSPITAL | Age: 36
End: 2021-07-06

## 2021-10-05 ENCOUNTER — PATIENT MESSAGE (OUTPATIENT)
Dept: ADMINISTRATIVE | Facility: HOSPITAL | Age: 36
End: 2021-10-05

## 2021-10-09 ENCOUNTER — IMMUNIZATION (OUTPATIENT)
Dept: INTERNAL MEDICINE | Facility: CLINIC | Age: 36
End: 2021-10-09
Payer: COMMERCIAL

## 2021-10-09 DIAGNOSIS — Z23 NEED FOR VACCINATION: Primary | ICD-10-CM

## 2021-10-09 PROCEDURE — 0003A COVID-19, MRNA, LNP-S, PF, 30 MCG/0.3 ML DOSE VACCINE: CPT | Mod: CV19,PBBFAC | Performed by: INTERNAL MEDICINE

## 2021-10-09 PROCEDURE — 91300 COVID-19, MRNA, LNP-S, PF, 30 MCG/0.3 ML DOSE VACCINE: CPT | Mod: PBBFAC | Performed by: INTERNAL MEDICINE

## 2021-12-09 ENCOUNTER — PATIENT OUTREACH (OUTPATIENT)
Dept: ADMINISTRATIVE | Facility: OTHER | Age: 36
End: 2021-12-09
Payer: COMMERCIAL

## 2021-12-11 ENCOUNTER — IMMUNIZATION (OUTPATIENT)
Dept: INTERNAL MEDICINE | Facility: CLINIC | Age: 36
End: 2021-12-11
Payer: COMMERCIAL

## 2021-12-11 PROCEDURE — 90686 IIV4 VACC NO PRSV 0.5 ML IM: CPT | Mod: S$GLB,,, | Performed by: INTERNAL MEDICINE

## 2021-12-11 PROCEDURE — 90686 FLU VACCINE (QUAD) GREATER THAN OR EQUAL TO 3YO PRESERVATIVE FREE IM: ICD-10-PCS | Mod: S$GLB,,, | Performed by: INTERNAL MEDICINE

## 2021-12-11 PROCEDURE — 90471 IMMUNIZATION ADMIN: CPT | Mod: S$GLB,,, | Performed by: INTERNAL MEDICINE

## 2021-12-11 PROCEDURE — 90471 FLU VACCINE (QUAD) GREATER THAN OR EQUAL TO 3YO PRESERVATIVE FREE IM: ICD-10-PCS | Mod: S$GLB,,, | Performed by: INTERNAL MEDICINE

## 2022-01-18 ENCOUNTER — PATIENT MESSAGE (OUTPATIENT)
Dept: ADMINISTRATIVE | Facility: HOSPITAL | Age: 37
End: 2022-01-18
Payer: COMMERCIAL

## 2022-02-01 ENCOUNTER — CLINICAL SUPPORT (OUTPATIENT)
Dept: OTHER | Facility: CLINIC | Age: 37
End: 2022-02-01
Payer: COMMERCIAL

## 2022-02-01 DIAGNOSIS — Z00.8 ENCOUNTER FOR OTHER GENERAL EXAMINATION: ICD-10-CM

## 2022-02-02 LAB
GLUCOSE SERPL-MCNC: 106 MG/DL (ref 60–140)
HDLC SERPL-MCNC: 39 MG/DL
POC CHOLESTEROL, LDL (DOCK): 63 MG/DL
POC CHOLESTEROL, TOTAL: 151 MG/DL
TRIGL SERPL-MCNC: 242 MG/DL

## 2022-02-09 ENCOUNTER — PATIENT OUTREACH (OUTPATIENT)
Dept: ADMINISTRATIVE | Facility: OTHER | Age: 37
End: 2022-02-09
Payer: COMMERCIAL

## 2022-02-10 NOTE — PROGRESS NOTES
Care Everywhere: updated  Immunization: updated  Health Maintenance: updated  Media Review:   Legacy Review:   DIS:  Order placed:   Upcoming appts:  EFAX:  Task Tickets:Scheduling ticket to schedule annual pcp visit sent to patient's portal on 1.18.2022  Referrals:

## 2022-06-13 NOTE — TELEPHONE ENCOUNTER
EGD Instructions emailed to [email or web address].   Attending Only I have personally provided the amount of critical care time documented below excluding time spent on separate procedures.

## 2022-06-29 ENCOUNTER — OFFICE VISIT (OUTPATIENT)
Dept: INTERNAL MEDICINE | Facility: CLINIC | Age: 37
End: 2022-06-29
Payer: COMMERCIAL

## 2022-06-29 VITALS
SYSTOLIC BLOOD PRESSURE: 140 MMHG | OXYGEN SATURATION: 97 % | WEIGHT: 250.69 LBS | DIASTOLIC BLOOD PRESSURE: 98 MMHG | HEART RATE: 95 BPM | BODY MASS INDEX: 40.29 KG/M2 | TEMPERATURE: 99 F | HEIGHT: 66 IN

## 2022-06-29 DIAGNOSIS — R42 DIZZINESS: ICD-10-CM

## 2022-06-29 DIAGNOSIS — R51.9 ACUTE NONINTRACTABLE HEADACHE, UNSPECIFIED HEADACHE TYPE: Primary | ICD-10-CM

## 2022-06-29 DIAGNOSIS — R03.0 ELEVATED BLOOD PRESSURE READING: ICD-10-CM

## 2022-06-29 DIAGNOSIS — R42 DIZZINESS OF UNKNOWN CAUSE: Primary | ICD-10-CM

## 2022-06-29 DIAGNOSIS — R51.9 LEFT-SIDED HEADACHE: ICD-10-CM

## 2022-06-29 LAB
B-HCG UR QL: NEGATIVE
B-HCG UR QL: NEGATIVE
BILIRUB UR QL STRIP: NEGATIVE
CLARITY UR REFRACT.AUTO: CLEAR
COLOR UR AUTO: YELLOW
CTP QC/QA: YES
CTP QC/QA: YES
GLUCOSE UR QL STRIP: NEGATIVE
HGB UR QL STRIP: NEGATIVE
KETONES UR QL STRIP: NEGATIVE
LEUKOCYTE ESTERASE UR QL STRIP: NEGATIVE
NITRITE UR QL STRIP: NEGATIVE
PH UR STRIP: 6 [PH] (ref 5–8)
PROT UR QL STRIP: NEGATIVE
SARS-COV-2 RDRP RESP QL NAA+PROBE: NEGATIVE
SP GR UR STRIP: 1.01 (ref 1–1.03)
URN SPEC COLLECT METH UR: NORMAL

## 2022-06-29 PROCEDURE — U0002 COVID-19 LAB TEST NON-CDC: HCPCS | Mod: QW,S$GLB,, | Performed by: INTERNAL MEDICINE

## 2022-06-29 PROCEDURE — 3080F DIAST BP >= 90 MM HG: CPT | Mod: CPTII,S$GLB,, | Performed by: INTERNAL MEDICINE

## 2022-06-29 PROCEDURE — 1160F RVW MEDS BY RX/DR IN RCRD: CPT | Mod: CPTII,95,, | Performed by: NURSE PRACTITIONER

## 2022-06-29 PROCEDURE — 99213 OFFICE O/P EST LOW 20 MIN: CPT | Mod: 95,,, | Performed by: NURSE PRACTITIONER

## 2022-06-29 PROCEDURE — 1160F RVW MEDS BY RX/DR IN RCRD: CPT | Mod: CPTII,S$GLB,, | Performed by: INTERNAL MEDICINE

## 2022-06-29 PROCEDURE — 99214 OFFICE O/P EST MOD 30 MIN: CPT | Mod: S$GLB,,, | Performed by: INTERNAL MEDICINE

## 2022-06-29 PROCEDURE — 1160F PR REVIEW ALL MEDS BY PRESCRIBER/CLIN PHARMACIST DOCUMENTED: ICD-10-PCS | Mod: CPTII,S$GLB,, | Performed by: INTERNAL MEDICINE

## 2022-06-29 PROCEDURE — 3077F SYST BP >= 140 MM HG: CPT | Mod: CPTII,S$GLB,, | Performed by: INTERNAL MEDICINE

## 2022-06-29 PROCEDURE — 3080F PR MOST RECENT DIASTOLIC BLOOD PRESSURE >= 90 MM HG: ICD-10-PCS | Mod: CPTII,S$GLB,, | Performed by: INTERNAL MEDICINE

## 2022-06-29 PROCEDURE — 1159F PR MEDICATION LIST DOCUMENTED IN MEDICAL RECORD: ICD-10-PCS | Mod: CPTII,S$GLB,, | Performed by: INTERNAL MEDICINE

## 2022-06-29 PROCEDURE — 3077F PR MOST RECENT SYSTOLIC BLOOD PRESSURE >= 140 MM HG: ICD-10-PCS | Mod: CPTII,S$GLB,, | Performed by: INTERNAL MEDICINE

## 2022-06-29 PROCEDURE — 99213 PR OFFICE/OUTPT VISIT, EST, LEVL III, 20-29 MIN: ICD-10-PCS | Mod: 95,,, | Performed by: NURSE PRACTITIONER

## 2022-06-29 PROCEDURE — 1159F PR MEDICATION LIST DOCUMENTED IN MEDICAL RECORD: ICD-10-PCS | Mod: CPTII,95,, | Performed by: NURSE PRACTITIONER

## 2022-06-29 PROCEDURE — 81025 URINE PREGNANCY TEST: CPT | Performed by: INTERNAL MEDICINE

## 2022-06-29 PROCEDURE — 1160F PR REVIEW ALL MEDS BY PRESCRIBER/CLIN PHARMACIST DOCUMENTED: ICD-10-PCS | Mod: CPTII,95,, | Performed by: NURSE PRACTITIONER

## 2022-06-29 PROCEDURE — 81025 POCT URINE PREGNANCY: ICD-10-PCS | Mod: S$GLB,,, | Performed by: INTERNAL MEDICINE

## 2022-06-29 PROCEDURE — 3008F PR BODY MASS INDEX (BMI) DOCUMENTED: ICD-10-PCS | Mod: CPTII,S$GLB,, | Performed by: INTERNAL MEDICINE

## 2022-06-29 PROCEDURE — 81003 URINALYSIS AUTO W/O SCOPE: CPT | Performed by: INTERNAL MEDICINE

## 2022-06-29 PROCEDURE — 99214 PR OFFICE/OUTPT VISIT, EST, LEVL IV, 30-39 MIN: ICD-10-PCS | Mod: S$GLB,,, | Performed by: INTERNAL MEDICINE

## 2022-06-29 PROCEDURE — 99999 PR PBB SHADOW E&M-EST. PATIENT-LVL IV: ICD-10-PCS | Mod: PBBFAC,,, | Performed by: INTERNAL MEDICINE

## 2022-06-29 PROCEDURE — U0002: ICD-10-PCS | Mod: QW,S$GLB,, | Performed by: INTERNAL MEDICINE

## 2022-06-29 PROCEDURE — 81025 URINE PREGNANCY TEST: CPT | Mod: S$GLB,,, | Performed by: INTERNAL MEDICINE

## 2022-06-29 PROCEDURE — 1159F MED LIST DOCD IN RCRD: CPT | Mod: CPTII,95,, | Performed by: NURSE PRACTITIONER

## 2022-06-29 PROCEDURE — 99999 PR PBB SHADOW E&M-EST. PATIENT-LVL IV: CPT | Mod: PBBFAC,,, | Performed by: INTERNAL MEDICINE

## 2022-06-29 PROCEDURE — 1159F MED LIST DOCD IN RCRD: CPT | Mod: CPTII,S$GLB,, | Performed by: INTERNAL MEDICINE

## 2022-06-29 PROCEDURE — 3008F BODY MASS INDEX DOCD: CPT | Mod: CPTII,S$GLB,, | Performed by: INTERNAL MEDICINE

## 2022-06-29 RX ORDER — AMLODIPINE BESYLATE 5 MG/1
5 TABLET ORAL DAILY
Qty: 30 TABLET | Refills: 11 | Status: SHIPPED | OUTPATIENT
Start: 2022-06-29 | End: 2022-07-21

## 2022-06-29 RX ORDER — MECLIZINE HCL 12.5 MG 12.5 MG/1
12.5 TABLET ORAL 3 TIMES DAILY PRN
Qty: 30 TABLET | Refills: 0 | Status: SHIPPED | OUTPATIENT
Start: 2022-06-29 | End: 2022-07-21 | Stop reason: SDUPTHER

## 2022-06-29 NOTE — PROGRESS NOTES
Subjective:       Patient ID: Kaylin Vargas is a 37 y.o. female.    Chief Complaint: Dizziness    The patient location is: [place]  The chief complaint leading to consultation is: dizziness    Visit type: audiovisual    Face to Face time with patient: 10 minutes  15 minutes of total time spent on the encounter, which includes face to face time and non-face to face time preparing to see the patient (eg, review of tests), Obtaining and/or reviewing separately obtained history, Documenting clinical information in the electronic or other health record, Independently interpreting results (not separately reported) and communicating results to the patient/family/caregiver, or Care coordination (not separately reported).         Each patient to whom he or she provides medical services by telemedicine is:  (1) informed of the relationship between the physician and patient and the respective role of any other health care provider with respect to management of the patient; and (2) notified that he or she may decline to receive medical services by telemedicine and may withdraw from such care at any time.    Notes: Pt of Dr. Madrigal, here virtually for complaint of dizziness, she is present at work today. Happened only once last night after eating something greasy.        Dizziness:   Chronicity:  New  Onset:  Yesterday (started last night after eating something greasy)  Progression since onset:  Resolved  Frequency - weeks/days included: happened only once last night no reocurrence.  Pain Scale:  0/10  Severity:  Mild  Duration:  1 hour  Dizziness characteristics:  Off-balance  Initial Spell Date and Length:  1 day  Time frame: only happened once last night.  Duration of Spells:  1 hour   Associated symptoms: headaches and light-headedness.no hearing loss, no ear congestion, no ear pain, no fever, no tinnitus, no nausea, no vomiting, no diaphoresis, no aural fullness, no weakness, no visual disturbances, no syncope,  no palpitations, no panic, no facial weakness, no slurred speech, no numbness in extremities and no chest pain.  Aggravated by:  Nothing  Treatments tried:  Nothing  Improvements on treatment:  No relief   PMH includes: anxiety.no strokes, no cardiac surgery, no neurologic disease, no head trauma, no balance testing, no ear trauma, no ear surgery, no head trauma, no ear infections, no ear tubes, no environmental allergies, no MRI head and no CT head.    Review of Systems   Constitutional: Negative for activity change, diaphoresis, fever and unexpected weight change.   HENT: Negative for ear pain, hearing loss, rhinorrhea, tinnitus and trouble swallowing.    Eyes: Negative for discharge and visual disturbance.   Respiratory: Negative for chest tightness and wheezing.    Cardiovascular: Negative for chest pain, palpitations and syncope.   Gastrointestinal: Negative for abdominal pain, blood in stool, constipation, diarrhea, nausea and vomiting.   Endocrine: Negative for cold intolerance, heat intolerance, polydipsia, polyphagia and polyuria.   Genitourinary: Negative for difficulty urinating, dysuria, hematuria and menstrual problem.   Musculoskeletal: Negative for arthralgias, joint swelling and neck pain.   Allergic/Immunologic: Negative for environmental allergies, food allergies and immunocompromised state.   Neurological: Positive for dizziness, light-headedness and headaches. Negative for tremors, syncope, facial asymmetry, speech difficulty, weakness, numbness, disturbances in coordination, memory loss and coordination difficulties.        As documented in HPI     Hematological: Negative for adenopathy. Does not bruise/bleed easily.   Psychiatric/Behavioral: Negative for confusion and dysphoric mood.     Review of patient's allergies indicates:  No Known Allergies    Current Outpatient Medications:     JACKELYN ROOT-PYRIDOXINE HCL,B6, ORAL, Take by mouth., Disp: , Rfl:     ibuprofen (ADVIL,MOTRIN) 800 MG  tablet, Take 1 tablet (800 mg total) by mouth every 8 (eight) hours as needed for Pain., Disp: 30 tablet, Rfl: 0    famotidine (PEPCID) 40 MG tablet, Take 1 tablet (40 mg total) by mouth every evening., Disp: 60 tablet, Rfl: 5    omeprazole (PRILOSEC) 40 MG capsule, Take 1 capsule (40 mg total) by mouth 2 (two) times daily before meals., Disp: 60 capsule, Rfl: 1    Patient Active Problem List   Diagnosis    Obesity (BMI 35.0-39.9 without comorbidity)    BMI 38.0-38.9,adult    Suspected sleep apnea    Contact w and exposure to oth viral communicable diseases    Gastro-esophageal reflux disease without esophagitis    Anxiety disorder, unspecified    Allergic rhinitis, unspecified    Epigastric pain    Left foot pain    Contusion of left foot     Past Medical History:   Diagnosis Date    Acne     Anxiety disorder, unspecified 4/30/2020    Gastro-esophageal reflux disease without esophagitis 4/30/2020    Obesity      Past Surgical History:   Procedure Laterality Date    ESOPHAGOGASTRODUODENOSCOPY N/A 5/11/2020    Procedure: EGD (ESOPHAGOGASTRODUODENOSCOPY);  Surgeon: Ramiro Trejo Jr., MD;  Location: [facility];  Service: Endoscopy;  Laterality: N/A;     Social History     Socioeconomic History    Marital status: Single    Number of children: 2   Tobacco Use    Smoking status: Never Smoker    Smokeless tobacco: Never Used   Substance and Sexual Activity    Alcohol use: Yes     Alcohol/week: 2.0 standard drinks     Types: 2 Shots of liquor per week     Comment: social    Drug use: No    Sexual activity: Yes     Partners: Male     Birth control/protection: None   Social History Narrative    . Adolescent children.      Social Determinants of Health     Financial Resource Strain: Low Risk     Difficulty of Paying Living Expenses: Not hard at all   Food Insecurity: No Food Insecurity    Worried About Running Out of Food in the Last Year: Never true    Ran Out of Food in the Last Year:  Never true   Transportation Needs: No Transportation Needs    Lack of Transportation (Medical): No    Lack of Transportation (Non-Medical): No   Physical Activity: Inactive    Days of Exercise per Week: 0 days    Minutes of Exercise per Session: 20 min   Stress: No Stress Concern Present    Feeling of Stress : Only a little   Social Connections: Unknown    Frequency of Communication with Friends and Family: Never    Frequency of Social Gatherings with Friends and Family: Never    Active Member of Clubs or Organizations: No    Attends Club or Organization Meetings: More than 4 times per year    Marital Status:    Housing Stability: Low Risk     Unable to Pay for Housing in the Last Year: No    Number of Places Lived in the Last Year: 1    Unstable Housing in the Last Year: No     Family History   Problem Relation Age of Onset    Diabetes Father     Hypertension Father     Hypertension Mother     Kidney disease Mother     Heart disease Mother     Crohn's disease Sister     Aneurysm Maternal Grandmother     Heart disease Maternal Grandfather     Heart disease Paternal Grandmother     Breast cancer Neg Hx     Colon cancer Neg Hx     Ovarian cancer Neg Hx     Melanoma Neg Hx            Objective:      Physical Exam      Limited PE, seen virtually, no distress during video visit    Assessment:       Problem List Items Addressed This Visit    None     Visit Diagnoses     Dizziness of unknown cause    -  Primary    Relevant Medications    meclizine (ANTIVERT) 12.5 mg tablet          Plan:     Kaylin was seen today for dizziness.    Diagnoses and all orders for this visit:    Dizziness of unknown cause  -     meclizine (ANTIVERT) 12.5 mg tablet; Take 1 tablet (12.5 mg total) by mouth 3 (three) times daily as needed for Dizziness.    Advised pt to take her BP and pulse at work and send to me to rule out elevated BP as cause    Start Meclizine TID prn dizziness    Explained to pt that  dizziness can be something as simple as vertigo to severe as cardiac causes which cannot be assessed virtually. If she develops more episodes, syncope, chest pain, shortness of breath or worsening symptoms would need to be seen in person at an urgent care or ER as this cannot be ruled out virtually.    Pt verbalizes understanding of the care, instructions, and post visit instructions explained during visit. Pt does not express any problems or concerns with the care rendered in clinic today.    Self care instructions provided in AVS    No follow-ups on file.

## 2022-06-29 NOTE — PROGRESS NOTES
Subjective:       Patient ID: Kaylin Vargas is a 37 y.o. female.    Chief Complaint: Dizziness      HPI  Kaylin Vargas is a 37 y.o. year old female with L frontal headache / light headedness / slight dizziness which started last night. Felt slightly better after sleeping. Went to work and the light headedness returned. Does report (+) nausea, denies vomiting. Ate breakfast this morning - had a boiled egg, grits and toast. Had 1 bottle of water today. Has not been hydrating much. Feels that the light headedness / dizziness is worse with positional changes. After settling down, feels better.     Last menstrual period 6/5/22 - 6/10/2022.     Reports she has not been drinking enough water. Reports urine is dark yellow.     Weight gain of 20 pounds in the last year, corresponds with higher blood pressures.    Review of Systems   Constitutional: Negative for activity change, appetite change, fatigue, fever and unexpected weight change.   HENT: Negative for congestion, hearing loss, postnasal drip, sneezing, sore throat, trouble swallowing and voice change.    Eyes: Negative for pain and discharge.   Respiratory: Negative for cough, choking, chest tightness, shortness of breath and wheezing.    Cardiovascular: Negative for chest pain, palpitations and leg swelling.   Gastrointestinal: Negative for abdominal distention, abdominal pain, blood in stool, constipation, diarrhea, nausea and vomiting.   Endocrine: Negative for polydipsia and polyuria.   Genitourinary: Negative for difficulty urinating and flank pain.   Musculoskeletal: Negative for arthralgias, back pain, joint swelling, myalgias and neck pain.   Skin: Negative for rash.   Neurological: Positive for light-headedness and headaches. Negative for dizziness, tremors, seizures, weakness and numbness.   Psychiatric/Behavioral: Negative for agitation. The patient is not nervous/anxious.          Past Medical History:   Diagnosis Date    Acne      "Anxiety disorder, unspecified 4/30/2020    Gastro-esophageal reflux disease without esophagitis 4/30/2020    Obesity         Prior to Admission medications    Medication Sig Start Date End Date Taking? Authorizing Provider   famotidine (PEPCID) 40 MG tablet Take 1 tablet (40 mg total) by mouth every evening.  Patient not taking: Reported on 6/29/2022 5/11/20 5/11/21  Ramiro Trejo Jr., MD   GINGER ROOT-PYRIDOXINE HCL,B6, ORAL Take by mouth.    Historical Provider   ibuprofen (ADVIL,MOTRIN) 800 MG tablet Take 1 tablet (800 mg total) by mouth every 8 (eight) hours as needed for Pain.  Patient not taking: Reported on 6/29/2022 7/5/21   Ashish Albright MD   meclizine (ANTIVERT) 12.5 mg tablet Take 1 tablet (12.5 mg total) by mouth 3 (three) times daily as needed for Dizziness.  Patient not taking: Reported on 6/29/2022 6/29/22   Zonia Magdaleno DNP   omeprazole (PRILOSEC) 40 MG capsule Take 1 capsule (40 mg total) by mouth 2 (two) times daily before meals.  Patient not taking: Reported on 6/29/2022 9/28/20 11/27/20  Maria Esther Bay DNP        Past medical history, surgical history, and family medical history reviewed and updated as appropriate.    Medications and allergies reviewed.     Objective:          Vitals:    06/29/22 1423   BP: (!) 140/98   BP Location: Right arm   Patient Position: Sitting   BP Method: Large (Manual)   Pulse: 95   Temp: 99 °F (37.2 °C)   TempSrc: Oral   SpO2: 97%   Weight: 113.7 kg (250 lb 10.6 oz)   Height: 5' 6" (1.676 m)     Body mass index is 40.46 kg/m².  Physical Exam  Constitutional:       Appearance: She is well-developed.   HENT:      Head: Normocephalic and atraumatic.   Eyes:      Extraocular Movements: Extraocular movements intact.      Comments: Slight lateral nystagmus to the left   Cardiovascular:      Rate and Rhythm: Normal rate and regular rhythm.      Heart sounds: Normal heart sounds.   Pulmonary:      Effort: Pulmonary effort is normal. No respiratory " distress.      Breath sounds: Normal breath sounds. No wheezing.   Abdominal:      General: Bowel sounds are normal. There is no distension.      Palpations: Abdomen is soft.      Tenderness: There is no abdominal tenderness.   Musculoskeletal:         General: No tenderness. Normal range of motion.      Cervical back: Normal range of motion.   Skin:     General: Skin is warm and dry.   Neurological:      Mental Status: She is alert and oriented to person, place, and time.      Cranial Nerves: No cranial nerve deficit.      Deep Tendon Reflexes: Reflexes are normal and symmetric.         Lab Results   Component Value Date    WBC 10.97 05/07/2020    HGB 12.0 05/07/2020    HCT 37.3 05/07/2020     05/07/2020    CHOL 134 07/26/2018    TRIG 58 07/26/2018    HDL 41 07/26/2018    ALT 15 05/02/2020    AST 16 05/02/2020     05/02/2020    K 3.4 (L) 05/02/2020     (L) 05/02/2020    CREATININE 0.9 05/02/2020    BUN 8 05/02/2020    CO2 22 (L) 05/02/2020    TSH 1.580 02/22/2016    HGBA1C 5.2 07/26/2018       Assessment:       1. Acute nonintractable headache, unspecified headache type    2. Dizziness    3. Left-sided headache    4. Elevated blood pressure reading          Plan:     Kaylin was seen today for dizziness.    Diagnoses and all orders for this visit:    Acute nonintractable headache, unspecified headache type  Comments:  R sided, frontal  Orders:  -     Urinalysis, Reflex to Urine Culture Urine, Clean Catch  -     CBC Auto Differential; Future  -     Comprehensive Metabolic Panel; Future  -     POCT COVID-19 Rapid Screening  -     PREGNANCY TEST, URINE RAPID    Dizziness  -     POCT COVID-19 Rapid Screening  -     PREGNANCY TEST, URINE RAPID  -     POCT Urine Pregnancy    Left-sided headache  -     POCT COVID-19 Rapid Screening  -     POCT Urine Pregnancy    Elevated blood pressure reading  -     amLODIPine (NORVASC) 5 MG tablet; Take 1 tablet (5 mg total) by mouth once daily.  -     POCT Urine  Pregnancy      Negative orthostatic vital signs  Slight lateral nystagmus to the left with extraocular movements  Left sided headache, frontal, not intractable, took tylenol   Needs to hydrate more  Meclizine has already been prescribed, patient to pick this up    Elevated blood pressure reading - has history of elevated blood pressures. Prescribed amlodipine 5 mg daily. Pt to do bp logs and nurse visit in 2 weeks for bp check.    Will obtain CBC, CMP and urinalysis to r/o UTI as cause, r/o anemia and electrolyte dyscrasias.     poct covid-19 negative  poct upt negative    Health maintenance reviewed with patient.     Follow up if symptoms worsen or fail to improve.    Nikolai Charles MD  Internal Medicine / Primary Care  [facility]  6/29/2022

## 2022-06-29 NOTE — PATIENT INSTRUCTIONS
Urine pregnacy test done in clinic  Covid-19 rapid test done in clinic    Urinalysis sent to lab to be evaluated  Labwork today to check blood counts, electrolytes, kidney/liver function    Start amlodipine 5 mg daily - the most common side effect is ankle swelling. Let us know if this is intolerable. If you become pregnant, let your ob/gyn know and they may switch your medications.     Blood pressure logs x 2 weeks. Check your blood pressure twice a day, write them down.   Nurse visit in 2 weeks for blood pressure check and blood pressure log review. Will change medications or increase medications if needed at that point.     Return to clinic as needed, schedule a follow up with your primary care provider.

## 2022-06-29 NOTE — PATIENT INSTRUCTIONS
Kaylin was seen today for dizziness.    Diagnoses and all orders for this visit:    Dizziness of unknown cause  -     meclizine (ANTIVERT) 12.5 mg tablet; Take 1 tablet (12.5 mg total) by mouth 3 (three) times daily as needed for Dizziness.    Advised pt to take her BP and pulse at work and send to me to rule out elevated BP as cause    Start Meclizine TID prn dizziness    Explained to pt that dizziness can be something as simple as vertigo to severe as cardiac causes which cannot be assessed virtually. If she develops more episodes, syncope, chest pain, shortness of breath or worsening symptoms would need to be seen in person at an urgent care or ER as this cannot be ruled out virtually.    Pt verbalizes understanding of the care, instructions, and post visit instructions explained during visit. Pt does not express any problems or concerns with the care rendered in clinic today.    Self care instructions provided in AVS

## 2022-06-30 ENCOUNTER — LAB VISIT (OUTPATIENT)
Dept: LAB | Facility: HOSPITAL | Age: 37
End: 2022-06-30
Attending: INTERNAL MEDICINE
Payer: COMMERCIAL

## 2022-06-30 DIAGNOSIS — R51.9 ACUTE NONINTRACTABLE HEADACHE, UNSPECIFIED HEADACHE TYPE: ICD-10-CM

## 2022-06-30 LAB
ALBUMIN SERPL BCP-MCNC: 3.5 G/DL (ref 3.5–5.2)
ALP SERPL-CCNC: 57 U/L (ref 55–135)
ALT SERPL W/O P-5'-P-CCNC: 22 U/L (ref 10–44)
ANION GAP SERPL CALC-SCNC: 6 MMOL/L (ref 8–16)
AST SERPL-CCNC: 19 U/L (ref 10–40)
BASOPHILS # BLD AUTO: 0.04 K/UL (ref 0–0.2)
BASOPHILS NFR BLD: 0.4 % (ref 0–1.9)
BILIRUB SERPL-MCNC: 0.4 MG/DL (ref 0.1–1)
BUN SERPL-MCNC: 11 MG/DL (ref 6–20)
CALCIUM SERPL-MCNC: 8.9 MG/DL (ref 8.7–10.5)
CHLORIDE SERPL-SCNC: 109 MMOL/L (ref 95–110)
CO2 SERPL-SCNC: 24 MMOL/L (ref 23–29)
CREAT SERPL-MCNC: 0.8 MG/DL (ref 0.5–1.4)
DIFFERENTIAL METHOD: ABNORMAL
EOSINOPHIL # BLD AUTO: 0.1 K/UL (ref 0–0.5)
EOSINOPHIL NFR BLD: 1.1 % (ref 0–8)
ERYTHROCYTE [DISTWIDTH] IN BLOOD BY AUTOMATED COUNT: 15.1 % (ref 11.5–14.5)
EST. GFR  (AFRICAN AMERICAN): >60 ML/MIN/1.73 M^2
EST. GFR  (NON AFRICAN AMERICAN): >60 ML/MIN/1.73 M^2
GLUCOSE SERPL-MCNC: 86 MG/DL (ref 70–110)
HCT VFR BLD AUTO: 36.8 % (ref 37–48.5)
HGB BLD-MCNC: 12.1 G/DL (ref 12–16)
IMM GRANULOCYTES # BLD AUTO: 0.03 K/UL (ref 0–0.04)
IMM GRANULOCYTES NFR BLD AUTO: 0.3 % (ref 0–0.5)
LYMPHOCYTES # BLD AUTO: 3.2 K/UL (ref 1–4.8)
LYMPHOCYTES NFR BLD: 32.5 % (ref 18–48)
MCH RBC QN AUTO: 28.3 PG (ref 27–31)
MCHC RBC AUTO-ENTMCNC: 32.9 G/DL (ref 32–36)
MCV RBC AUTO: 86 FL (ref 82–98)
MONOCYTES # BLD AUTO: 0.7 K/UL (ref 0.3–1)
MONOCYTES NFR BLD: 7.4 % (ref 4–15)
NEUTROPHILS # BLD AUTO: 5.7 K/UL (ref 1.8–7.7)
NEUTROPHILS NFR BLD: 58.3 % (ref 38–73)
NRBC BLD-RTO: 0 /100 WBC
PLATELET # BLD AUTO: 295 K/UL (ref 150–450)
PMV BLD AUTO: 11 FL (ref 9.2–12.9)
POTASSIUM SERPL-SCNC: 4.2 MMOL/L (ref 3.5–5.1)
PROT SERPL-MCNC: 7.3 G/DL (ref 6–8.4)
RBC # BLD AUTO: 4.27 M/UL (ref 4–5.4)
SODIUM SERPL-SCNC: 139 MMOL/L (ref 136–145)
WBC # BLD AUTO: 9.81 K/UL (ref 3.9–12.7)

## 2022-06-30 PROCEDURE — 36415 COLL VENOUS BLD VENIPUNCTURE: CPT | Performed by: INTERNAL MEDICINE

## 2022-06-30 PROCEDURE — 80053 COMPREHEN METABOLIC PANEL: CPT | Performed by: INTERNAL MEDICINE

## 2022-06-30 PROCEDURE — 85025 COMPLETE CBC W/AUTO DIFF WBC: CPT | Performed by: INTERNAL MEDICINE

## 2022-07-11 ENCOUNTER — OFFICE VISIT (OUTPATIENT)
Dept: URGENT CARE | Facility: CLINIC | Age: 37
End: 2022-07-11
Payer: COMMERCIAL

## 2022-07-11 VITALS
TEMPERATURE: 99 F | OXYGEN SATURATION: 95 % | HEIGHT: 66 IN | SYSTOLIC BLOOD PRESSURE: 138 MMHG | RESPIRATION RATE: 18 BRPM | BODY MASS INDEX: 40.18 KG/M2 | DIASTOLIC BLOOD PRESSURE: 101 MMHG | HEART RATE: 96 BPM | WEIGHT: 250 LBS

## 2022-07-11 DIAGNOSIS — J34.89 SINUS PRESSURE: ICD-10-CM

## 2022-07-11 DIAGNOSIS — R09.81 NASAL CONGESTION: ICD-10-CM

## 2022-07-11 DIAGNOSIS — R42 DIZZINESS: Primary | ICD-10-CM

## 2022-07-11 LAB — GLUCOSE SERPL-MCNC: 90 MG/DL (ref 70–110)

## 2022-07-11 PROCEDURE — 3075F SYST BP GE 130 - 139MM HG: CPT | Mod: CPTII,S$GLB,,

## 2022-07-11 PROCEDURE — 82962 POCT GLUCOSE, HAND-HELD DEVICE: ICD-10-PCS | Mod: S$GLB,,,

## 2022-07-11 PROCEDURE — 1160F RVW MEDS BY RX/DR IN RCRD: CPT | Mod: CPTII,S$GLB,,

## 2022-07-11 PROCEDURE — 3008F BODY MASS INDEX DOCD: CPT | Mod: CPTII,S$GLB,,

## 2022-07-11 PROCEDURE — 99213 OFFICE O/P EST LOW 20 MIN: CPT | Mod: S$GLB,,,

## 2022-07-11 PROCEDURE — 3075F PR MOST RECENT SYSTOLIC BLOOD PRESS GE 130-139MM HG: ICD-10-PCS | Mod: CPTII,S$GLB,,

## 2022-07-11 PROCEDURE — 93010 ELECTROCARDIOGRAM REPORT: CPT | Mod: S$GLB,,, | Performed by: INTERNAL MEDICINE

## 2022-07-11 PROCEDURE — 99213 PR OFFICE/OUTPT VISIT, EST, LEVL III, 20-29 MIN: ICD-10-PCS | Mod: S$GLB,,,

## 2022-07-11 PROCEDURE — 3008F PR BODY MASS INDEX (BMI) DOCUMENTED: ICD-10-PCS | Mod: CPTII,S$GLB,,

## 2022-07-11 PROCEDURE — 1159F MED LIST DOCD IN RCRD: CPT | Mod: CPTII,S$GLB,,

## 2022-07-11 PROCEDURE — 3080F PR MOST RECENT DIASTOLIC BLOOD PRESSURE >= 90 MM HG: ICD-10-PCS | Mod: CPTII,S$GLB,,

## 2022-07-11 PROCEDURE — 93005 ELECTROCARDIOGRAM TRACING: CPT | Mod: S$GLB,,,

## 2022-07-11 PROCEDURE — 3080F DIAST BP >= 90 MM HG: CPT | Mod: CPTII,S$GLB,,

## 2022-07-11 PROCEDURE — 93005 EKG 12-LEAD: ICD-10-PCS | Mod: S$GLB,,,

## 2022-07-11 PROCEDURE — 82962 GLUCOSE BLOOD TEST: CPT | Mod: S$GLB,,,

## 2022-07-11 PROCEDURE — 1160F PR REVIEW ALL MEDS BY PRESCRIBER/CLIN PHARMACIST DOCUMENTED: ICD-10-PCS | Mod: CPTII,S$GLB,,

## 2022-07-11 PROCEDURE — 1159F PR MEDICATION LIST DOCUMENTED IN MEDICAL RECORD: ICD-10-PCS | Mod: CPTII,S$GLB,,

## 2022-07-11 PROCEDURE — 93010 EKG 12-LEAD: ICD-10-PCS | Mod: S$GLB,,, | Performed by: INTERNAL MEDICINE

## 2022-07-12 NOTE — PATIENT INSTRUCTIONS
- Cetirizine and flonase    - Rest.    - Drink plenty of fluids.    - Tylenol or Ibuprofen as directed as needed for fever/pain.    - If you were prescribed antibiotics, please take them to completion.  - If you are female and on birth control pills - please use additional methods of contraception to prevent pregnancy while on antibiotics and for one cycle after.   - If you were prescribed a narcotic medication or muscle relaxer, do not drive or operate heavy equipment or machinery while taking these medications, as they can cause drowsiness.   - If you smoke, please stop smoking.  -You must understand that you've received an Urgent Care treatment only and that you may be released before all your medical problems are known or treated. You, the patient, will    arrange for follow up care as instructed. Please arrange follow up with your primary medical clinic as soon as possible.   - Follow up with your PCP or specialty clinic as directed in the next 1-2 weeks if not improved or as needed.  You can call [phone] to schedule an appointment with the appropriate provider.    - Please return to Urgent Care or to the Emergency Department if your symptoms worsen.

## 2022-07-12 NOTE — PROGRESS NOTES
"Subjective:       Patient ID: Kaylin Vargas is a 37 y.o. female.    Vitals:  height is 5' 6" (1.676 m) and weight is 113.4 kg (250 lb). Her temperature is 98.8 °F (37.1 °C). Her blood pressure is 138/101 (abnormal) and her pulse is 96. Her respiration is 18 and oxygen saturation is 95%.     Chief Complaint: Dizziness    Patient is a 37-year-old female with a history of hypertension presents with dizziness lightheadedness that started on 06/25/2022.  Patient was seen by her PCP on 06/29/2022.  States that she was recently placed on amlodipine 5 mg for blood pressure and on meclizine 12.5 mg for her dizziness.  Also complains sinus pressure and congestion as well as a left frontal headache.  The headache itself is described as a pressure.  Relieved with sleep and temporarily with Tylenol.  Denies sensation of room spinning.  Dizziness triggered by sudden positional changes, such as getting from a seated to standing position.  Denies any syncope, nausea, vomiting, fever, chills, rhinorrhea, tinnitus, hearing loss, coughing, chest pain, shortness of breath, numbness, tingling, focal weakness or loss of sensation, acute vision changes, neck stiffness.    Dizziness:   Chronicity:  New  Onset:  1 to 4 weeks ago  Progression since onset:  Unchanged  Frequency:  Constantly  Pain Scale:  0/10  Dizziness characteristics:  Off-balance   Associated symptoms: light-headedness.no ear pain, no fever, no headaches, no tinnitus, no nausea, no vomiting, no palpitations and no chest pain.  Treatments tried: prescription meds   Improvements on treatment:  No relief      Constitution: Negative for chills and fever.   HENT: Positive for congestion and sinus pressure. Negative for ear pain, ear discharge, tinnitus, postnasal drip and sore throat.    Neck: Negative for neck pain and neck stiffness.   Cardiovascular: Negative for chest pain and palpitations.   Eyes: Negative for eye itching, eye pain, eye redness, photophobia, " vision loss, double vision and blurred vision.   Respiratory: Negative for chest tightness, cough and shortness of breath.    Gastrointestinal: Negative for abdominal pain, nausea, vomiting and diarrhea.   Genitourinary: Negative for dysuria.   Skin: Negative for rash.   Neurological: Positive for dizziness and light-headedness. Negative for loss of balance, headaches, disorientation, altered mental status, numbness, tingling, seizures and tremors.   Psychiatric/Behavioral: Negative for altered mental status, disorientation and confusion.       Objective:      Physical Exam   Constitutional: She is oriented to person, place, and time. She appears well-developed.  Non-toxic appearance. She does not appear ill. No distress.   HENT:   Head: Normocephalic and atraumatic.      Comments: Negative [place]  Ears:   Right Ear: Hearing, tympanic membrane, external ear and ear canal normal.   Left Ear: Hearing, tympanic membrane, external ear and ear canal normal.   Nose: Nose normal. No mucosal edema, rhinorrhea or nasal deformity. No epistaxis. Right sinus exhibits no maxillary sinus tenderness and no frontal sinus tenderness. Left sinus exhibits no maxillary sinus tenderness and no frontal sinus tenderness.   Mouth/Throat: Uvula is midline and oropharynx is clear and moist. Mucous membranes are moist. No trismus in the jaw. Normal dentition. No uvula swelling. No posterior oropharyngeal erythema. Oropharynx is clear.   Eyes: Conjunctivae, EOM and lids are normal. Pupils are equal, round, and reactive to light. No scleral icterus. Extraocular movement intact   Neck: Trachea normal and phonation normal. Neck supple. No neck rigidity present.   Cardiovascular: Normal rate, regular rhythm, normal heart sounds and normal pulses.   Pulmonary/Chest: Effort normal and breath sounds normal. No respiratory distress.   Abdominal: Normal appearance and bowel sounds are normal. She exhibits no distension. Soft. There is no  abdominal tenderness.   Musculoskeletal: Normal range of motion.         General: No deformity. Normal range of motion.   Neurological: no focal deficit. She is alert and oriented to person, place, and time. She has normal motor skills and normal sensation. No cranial nerve deficit. She exhibits normal muscle tone. Gait and coordination normal. Coordination normal. GCS eye subscore is 4. GCS verbal subscore is 5. GCS motor subscore is 6.   Skin: Skin is warm, dry, intact, not diaphoretic and not pale. Capillary refill takes less than 2 seconds.   Psychiatric: Her speech is normal and behavior is normal. Judgment and thought content normal.   Nursing note and vitals reviewed.        Results for orders placed or performed in visit on 07/11/22   POCT Glucose, Hand-Held Device   Result Value Ref Range    POC Glucose 90 70 - 110 MG/DL     EKG: HR 84. normal EKG, normal sinus rhythm, unchanged from previous tracings.    Orthostatics:  Laying 138/96     96  Sitting 144/102   96   [id number]     91     Assessment:       1. Dizziness    2. Nasal congestion    3. Sinus pressure          Plan:         Dizziness  -     Orthostatic vital signs  -     POCT Glucose, Hand-Held Device  -     IN OFFICE EKG 12-LEAD (to Muse)    Nasal congestion    Sinus pressure         Patient is a 37-year-old female presents with a 2 week a history dizziness lightheadedness.  Exacerbated by positional changes.  Denies any syncope, chest pain, shortness of breath, palpitations, weakness or sensation loss.  Has been taking meclizine without relief of the dizziness.  Also complains congestion sinus pressure that has been ongoing for about 2 weeks as well.  Left frontal headache relieved with sleep and temporarily with Tylenol.  Blood pressure mildly elevated to 138/101.  Other vital signs within normal limits.  On exam, patient nontoxic and afebrile.  Lungs clear to auscultation bilaterally.  RRR.  TM clear bilaterally.  No sinus tenderness.   Neurologically intact without focal deficits.  Orthostatics negative.  POCT glucose WNL. EKG shows NSR without acute ischemic changes. Possibly sinusitis causing headaches and dizziness. Discussed with pt to try conservative measures first with flonase and antihistamine to see if it helps sxs. Low suspicion for emergent causes of dizziness such as ACS, CVA, arrythmia. Continue with hydration. Clinic return and ED precautions.  Appt with PCP on 7/13/22 for BP. Pt verbalizes understanding and agrees with plan.          Patient Instructions   - Cetirizine and flonase    - Rest.    - Drink plenty of fluids.    - Tylenol or Ibuprofen as directed as needed for fever/pain.    - If you were prescribed antibiotics, please take them to completion.  - If you are female and on birth control pills - please use additional methods of contraception to prevent pregnancy while on antibiotics and for one cycle after.   - If you were prescribed a narcotic medication or muscle relaxer, do not drive or operate heavy equipment or machinery while taking these medications, as they can cause drowsiness.   - If you smoke, please stop smoking.  -You must understand that you've received an Urgent Care treatment only and that you may be released before all your medical problems are known or treated. You, the patient, will    arrange for follow up care as instructed. Please arrange follow up with your primary medical clinic as soon as possible.   - Follow up with your PCP or specialty clinic as directed in the next 1-2 weeks if not improved or as needed.  You can call [phone] to schedule an appointment with the appropriate provider.    - Please return to Urgent Care or to the Emergency Department if your symptoms worsen.

## 2022-07-21 ENCOUNTER — OFFICE VISIT (OUTPATIENT)
Dept: INTERNAL MEDICINE | Facility: CLINIC | Age: 37
End: 2022-07-21
Payer: COMMERCIAL

## 2022-07-21 VITALS
HEART RATE: 85 BPM | HEIGHT: 66 IN | SYSTOLIC BLOOD PRESSURE: 130 MMHG | DIASTOLIC BLOOD PRESSURE: 74 MMHG | WEIGHT: 252.19 LBS | BODY MASS INDEX: 40.53 KG/M2 | OXYGEN SATURATION: 98 %

## 2022-07-21 DIAGNOSIS — R42 DIZZINESS OF UNKNOWN CAUSE: ICD-10-CM

## 2022-07-21 DIAGNOSIS — R03.0 ELEVATED BLOOD PRESSURE READING: ICD-10-CM

## 2022-07-21 PROCEDURE — 3075F PR MOST RECENT SYSTOLIC BLOOD PRESS GE 130-139MM HG: ICD-10-PCS | Mod: CPTII,S$GLB,, | Performed by: INTERNAL MEDICINE

## 2022-07-21 PROCEDURE — 3075F SYST BP GE 130 - 139MM HG: CPT | Mod: CPTII,S$GLB,, | Performed by: INTERNAL MEDICINE

## 2022-07-21 PROCEDURE — 3078F DIAST BP <80 MM HG: CPT | Mod: CPTII,S$GLB,, | Performed by: INTERNAL MEDICINE

## 2022-07-21 PROCEDURE — 3008F PR BODY MASS INDEX (BMI) DOCUMENTED: ICD-10-PCS | Mod: CPTII,S$GLB,, | Performed by: INTERNAL MEDICINE

## 2022-07-21 PROCEDURE — 99999 PR PBB SHADOW E&M-EST. PATIENT-LVL III: CPT | Mod: PBBFAC,,, | Performed by: INTERNAL MEDICINE

## 2022-07-21 PROCEDURE — 3008F BODY MASS INDEX DOCD: CPT | Mod: CPTII,S$GLB,, | Performed by: INTERNAL MEDICINE

## 2022-07-21 PROCEDURE — 99999 PR PBB SHADOW E&M-EST. PATIENT-LVL III: ICD-10-PCS | Mod: PBBFAC,,, | Performed by: INTERNAL MEDICINE

## 2022-07-21 PROCEDURE — 99213 PR OFFICE/OUTPT VISIT, EST, LEVL III, 20-29 MIN: ICD-10-PCS | Mod: S$GLB,,, | Performed by: INTERNAL MEDICINE

## 2022-07-21 PROCEDURE — 3078F PR MOST RECENT DIASTOLIC BLOOD PRESSURE < 80 MM HG: ICD-10-PCS | Mod: CPTII,S$GLB,, | Performed by: INTERNAL MEDICINE

## 2022-07-21 PROCEDURE — 99213 OFFICE O/P EST LOW 20 MIN: CPT | Mod: S$GLB,,, | Performed by: INTERNAL MEDICINE

## 2022-07-21 RX ORDER — AMOXICILLIN AND CLAVULANATE POTASSIUM 875; 125 MG/1; MG/1
1 TABLET, FILM COATED ORAL EVERY 12 HOURS
Qty: 14 TABLET | Refills: 0 | Status: SHIPPED | OUTPATIENT
Start: 2022-07-21 | End: 2022-07-28

## 2022-07-21 RX ORDER — MECLIZINE HCL 12.5 MG 12.5 MG/1
12.5 TABLET ORAL 3 TIMES DAILY PRN
Qty: 30 TABLET | Refills: 0 | Status: SHIPPED | OUTPATIENT
Start: 2022-07-21 | End: 2022-09-16 | Stop reason: SDUPTHER

## 2022-07-21 RX ORDER — PREDNISONE 20 MG/1
40 TABLET ORAL DAILY
Qty: 10 TABLET | Refills: 0 | Status: SHIPPED | OUTPATIENT
Start: 2022-07-21 | End: 2022-07-26

## 2022-07-21 RX ORDER — AMLODIPINE BESYLATE 5 MG/1
5 TABLET ORAL DAILY
Qty: 90 TABLET | Refills: 3 | Status: SHIPPED | OUTPATIENT
Start: 2022-07-21 | End: 2022-09-22

## 2022-08-10 NOTE — PROGRESS NOTES
"This note was generated with [employer] voice recognition software. I apologize for any possible typographical errors.    She is a 37-year-old female coming in today for 3 weeks a dizziness and sinus pressure.  She has tried Claritin and Flonase and they have not helped.  She has had some dizziness and she is taking meclizine for this and has helped.  She has had some high blood pressure also reviewed her hypertension readings with her from previous visit.  She has COVID tested negative at home.  Denies any recent exposures to COVID.  sHe otherwise has no new complaints.    Past Medical History:   Diagnosis Date    Acne     Anxiety disorder, unspecified 04/30/2020    Gastro-esophageal reflux disease without esophagitis 04/30/2020    Hypertension     Obesity          ROS : Gen - no fatigue or significant weight change  Eyes - no eye pain or visual changes  ENT - she has not had any hoarseness but she has has cough.  She says that many from postnasal drip.  CV - No chest pain or SOB.  NO palpitations.  Pulm - no  wheezing  GI - no N/V/D   no dysuria or incontinence  MS - no joint pain or muscle pain  Skin - no rash, or c/o of skin lesions  Neuro - she has had some dizziness when she turns quickly and she has felt like her ears been stopped up.  Heme - no abnormal bleeding or bruising  Endo - no polydipsia, or temperature changes  Psych - no anxiety or depression    /74   Pulse 85   Ht 5' 6" (1.676 m)   Wt 114.4 kg (252 lb 3.3 oz)   LMP 07/05/2022 (Approximate)   SpO2 98%   BMI 40.71 kg/m²   She is well-appearing.  She got some fluid behind both eardrums.  Hearing is grossly normal though.  Oropharynx is clear of any exudate she does have some  postnasal drip..  She has some pressure over her   maxillary sinus     Elevated blood pressure reading  -     amLODIPine (NORVASC) 5 MG tablet; Take 1 tablet (5 mg total) by mouth once daily.  Dispense: 90 tablet; Refill: 3    Dizziness of unknown cause  -     " meclizine (ANTIVERT) 12.5 mg tablet; Take 1 tablet (12.5 mg total) by mouth 3 (three) times daily as needed for Dizziness.  Dispense: 30 tablet; Refill: 0    Other orders  -     predniSONE (DELTASONE) 20 MG tablet; Take 2 tablets (40 mg total) by mouth once daily. for 5 days  Dispense: 10 tablet; Refill: 0  -     amoxicillin-clavulanate 875-125mg (AUGMENTIN) 875-125 mg per tablet; Take 1 tablet by mouth every 12 (twelve) hours. for 7 days  Dispense: 14 tablet; Refill: 0      He is going to bring her back again to recheck her blood pressure.  She is going to return to clinic if she does not have improvement from her maxillary sinusitis.

## 2022-08-17 PROBLEM — I10 PRIMARY HYPERTENSION: Status: ACTIVE | Noted: 2022-08-17

## 2022-09-15 ENCOUNTER — PATIENT MESSAGE (OUTPATIENT)
Dept: ADMINISTRATIVE | Facility: OTHER | Age: 37
End: 2022-09-15
Payer: COMMERCIAL

## 2022-09-16 DIAGNOSIS — R42 DIZZINESS OF UNKNOWN CAUSE: ICD-10-CM

## 2022-09-16 RX ORDER — MECLIZINE HCL 12.5 MG 12.5 MG/1
12.5 TABLET ORAL 3 TIMES DAILY PRN
Qty: 30 TABLET | Refills: 0 | Status: SHIPPED | OUTPATIENT
Start: 2022-09-16 | End: 2023-01-18 | Stop reason: SDUPTHER

## 2022-09-16 NOTE — TELEPHONE ENCOUNTER
No new care gaps identified.  [facility] Embedded Care Gaps. Reference number: [id number]. 9/16/2022   12:54:15 PM CDT

## 2022-09-21 NOTE — PROGRESS NOTES
Subjective:   Patient ID:  Kaylin Vargas is a 37 y.o. female who presents for evaluation of Establish Care, Follow-up, Dizziness, and Referral Authorization (PCP thinks pt may have vertigo,per pt.)      HPI:  She presents today for evaluation of dizziness and hypertension.  She states she 1st noticed the dizziness toward the end of June about a week before being diagnosed with COVID.  She had sinus congestion at the time.  She saw Dr. Conley and was given a prescription to start amlodipine 5 mg daily for hypertension and also a prescription for meclizine which did help.  The dizziness has continued in mainly occurs when she turns her head.  She is not seeing the room spin and is not having nausea or vomiting.  She has not had any syncopal events.  She denies any palpitations.  She is actively participating in the digital hypertension program.  She has developed some ankle edema on the amlodipine.  She exercises on a regular basis with her .    ECG 04-AUG-2022 18:30:12: Personally reviewed by me shows NSR    Past Medical History:   Diagnosis Date    Acne     Anxiety disorder, unspecified 04/30/2020    Gastro-esophageal reflux disease without esophagitis 04/30/2020    Hypertension     Obesity        Past Surgical History:   Procedure Laterality Date    ESOPHAGOGASTRODUODENOSCOPY N/A 5/11/2020    Procedure: EGD (ESOPHAGOGASTRODUODENOSCOPY);  Surgeon: aRmiro Trejo Jr., MD;  Location: [facility];  Service: Endoscopy;  Laterality: N/A;       Social History     Socioeconomic History    Marital status:     Number of children: 2   Tobacco Use    Smoking status: Never    Smokeless tobacco: Never   Substance and Sexual Activity    Alcohol use: Yes     Alcohol/week: 2.0 standard drinks     Types: 2 Shots of liquor per week     Comment: social    Drug use: No    Sexual activity: Yes     Partners: Male     Birth control/protection: None   Social History Narrative    . Adolescent children.       Social Determinants of Health     Financial Resource Strain: Low Risk     Difficulty of Paying Living Expenses: Not very hard   Food Insecurity: Food Insecurity Present    Worried About Running Out of Food in the Last Year: Sometimes true    Ran Out of Food in the Last Year: Sometimes true   Transportation Needs: No Transportation Needs    Lack of Transportation (Medical): No    Lack of Transportation (Non-Medical): No   Physical Activity: Insufficiently Active    Days of Exercise per Week: 4 days    Minutes of Exercise per Session: 30 min   Stress: Stress Concern Present    Feeling of Stress : To some extent   Social Connections: Unknown    Frequency of Communication with Friends and Family: More than three times a week    Frequency of Social Gatherings with Friends and Family: Three times a week    Active Member of Clubs or Organizations: Yes    Attends Club or Organization Meetings: 1 to 4 times per year    Marital Status:    Housing Stability: High Risk    Unable to Pay for Housing in the Last Year: Yes    Number of Places Lived in the Last Year: 1    Unstable Housing in the Last Year: No       Family History   Problem Relation Age of Onset    Diabetes Father     Hypertension Father     Hypertension Mother     Kidney disease Mother     Heart disease Mother     Crohn's disease Sister     Aneurysm Maternal Grandmother     Heart disease Maternal Grandfather     Heart disease Paternal Grandmother     Breast cancer Neg Hx     Colon cancer Neg Hx     Ovarian cancer Neg Hx     Melanoma Neg Hx        Patient's Medications   New Prescriptions    VALSARTAN (DIOVAN) 80 MG TABLET    Take 1 tablet (80 mg total) by mouth once daily.   Previous Medications    GINGER ROOT-PYRIDOXINE HCL,B6, ORAL    Take by mouth.    MECLIZINE (ANTIVERT) 12.5 MG TABLET    Take 1 tablet (12.5 mg total) by mouth 3 (three) times daily as needed for Dizziness.   Modified Medications    No medications on file   Discontinued Medications     "AMLODIPINE (NORVASC) 5 MG TABLET    Take 1 tablet (5 mg total) by mouth once daily.       Review of Systems   Constitutional: Negative for malaise/fatigue and weight gain.   HENT:  Negative for hearing loss.    Eyes:  Negative for visual disturbance.   Cardiovascular:  Negative for chest pain, claudication, dyspnea on exertion, leg swelling, near-syncope, orthopnea, palpitations, paroxysmal nocturnal dyspnea and syncope.   Respiratory:  Negative for cough, shortness of breath, sleep disturbances due to breathing, snoring and wheezing.    Endocrine: Negative for cold intolerance, heat intolerance, polydipsia, polyphagia and polyuria.   Hematologic/Lymphatic: Negative for bleeding problem. Does not bruise/bleed easily.   Skin:  Negative for rash and suspicious lesions.   Musculoskeletal:  Negative for arthritis, falls, joint pain, muscle weakness and myalgias.   Gastrointestinal:  Negative for abdominal pain, change in bowel habit, constipation, diarrhea, heartburn, hematochezia, melena and nausea.   Genitourinary:  Negative for hematuria and nocturia.   Neurological:  Positive for dizziness. Negative for excessive daytime sleepiness, headaches, light-headedness, loss of balance and weakness.   Psychiatric/Behavioral:  Negative for depression. The patient is not nervous/anxious.    Allergic/Immunologic: Negative for environmental allergies.     /80 (BP Location: Left arm, Patient Position: Sitting, BP Method: Large (Automatic))   Pulse 89   Ht 5' 6" (1.676 m)   Wt 112.7 kg (248 lb 7.3 oz)   SpO2 95%   BMI 40.10 kg/m²     Objective:   Physical Exam  Constitutional:       Appearance: She is well-developed.      Comments:      HENT:      Head: Normocephalic and atraumatic.   Eyes:      General: No scleral icterus.     Conjunctiva/sclera: Conjunctivae normal.      Pupils: Pupils are equal, round, and reactive to light.   Neck:      Thyroid: No thyromegaly.      Vascular: No hepatojugular reflux or JVD.      " Trachea: No tracheal deviation.   Cardiovascular:      Rate and Rhythm: Normal rate and regular rhythm.      Chest Wall: PMI is not displaced.      Pulses: Intact distal pulses.           Carotid pulses are 2+ on the right side and 2+ on the left side.       Radial pulses are 2+ on the right side and 2+ on the left side.        Dorsalis pedis pulses are 2+ on the right side and 2+ on the left side.        Posterior tibial pulses are 2+ on the right side and 2+ on the left side.      Heart sounds: Normal heart sounds.   Pulmonary:      Effort: Pulmonary effort is normal.      Breath sounds: Normal breath sounds.   Abdominal:      General: Bowel sounds are normal. There is no distension.      Palpations: Abdomen is soft. There is no mass.      Tenderness: There is no abdominal tenderness.   Musculoskeletal:         General: No tenderness.      Cervical back: Normal range of motion and neck supple.   Lymphadenopathy:      Cervical: No cervical adenopathy.   Skin:     General: Skin is warm and dry.      Findings: No erythema or rash.      Nails: There is no clubbing.   Neurological:      Mental Status: She is alert and oriented to person, place, and time.      Comments: Dizziness was elicited with lying flat in turning her head to the left.  I did not see nystagmus   Psychiatric:         Speech: Speech normal.         Behavior: Behavior normal.       Lab Results   Component Value Date     06/30/2022    K 4.2 06/30/2022     06/30/2022    CO2 24 06/30/2022    BUN 11 06/30/2022    CREATININE 0.8 06/30/2022    GLU 86 06/30/2022    HGBA1C 5.2 07/26/2018    AST 19 06/30/2022    ALT 22 06/30/2022    ALBUMIN 3.5 06/30/2022    PROT 7.3 06/30/2022    BILITOT 0.4 06/30/2022    WBC 9.81 06/30/2022    HGB 12.1 06/30/2022    HCT 36.8 (L) 06/30/2022    MCV 86 06/30/2022     06/30/2022    TSH 1.580 02/22/2016    CHOL 134 07/26/2018    HDL 41 07/26/2018    LDLCALC 81.4 07/26/2018    TRIG 58 07/26/2018        Assessment:     1. Primary hypertension :  She has stage I hypertension and is having pedal edema with amlodipine.  I have given her a prescription to start valsartan 80 mg daily in place of the amlodipine.  Will check a BMP in 1 week.  She is actively participating in the digital hypertension program. Limit sodium intake to < 1500mg daily and follow the DASH diet plan.   2. Obesity (BMI 35.0-39.9 without comorbidity) : Following a heart health diet such as the Mediterranean Diet is recommended in addition to 150 minutes a week of moderate intensity exercise to lower cholesterol, maintain a healthy body weight, and improve overall cardiovascular health.   3. Dizziness :  Sounds like vertigo.  I do not suspect a cardiac etiology.  Recommend follow-up with ENT if her symptoms do not improve.       Plan:     Kaylin was seen today for establish care, follow-up, dizziness and referral authorization.    Diagnoses and all orders for this visit:    Primary hypertension  -     Basic Metabolic Panel; Future  -     Lipid Panel; Future    Obesity (BMI 35.0-39.9 without comorbidity)    Dizziness    Other orders  -     valsartan (DIOVAN) 80 MG tablet; Take 1 tablet (80 mg total) by mouth once daily.      Thank you for allowing me to participate in this patient's care. Please do not hesitate to contact me with any questions or concerns.

## 2022-09-22 ENCOUNTER — OFFICE VISIT (OUTPATIENT)
Dept: CARDIOLOGY | Facility: CLINIC | Age: 37
End: 2022-09-22
Payer: COMMERCIAL

## 2022-09-22 VITALS
HEIGHT: 66 IN | OXYGEN SATURATION: 95 % | HEART RATE: 89 BPM | SYSTOLIC BLOOD PRESSURE: 135 MMHG | DIASTOLIC BLOOD PRESSURE: 80 MMHG | BODY MASS INDEX: 39.93 KG/M2 | WEIGHT: 248.44 LBS

## 2022-09-22 DIAGNOSIS — E66.9 OBESITY (BMI 35.0-39.9 WITHOUT COMORBIDITY): ICD-10-CM

## 2022-09-22 DIAGNOSIS — R42 DIZZINESS: ICD-10-CM

## 2022-09-22 DIAGNOSIS — I10 PRIMARY HYPERTENSION: Primary | ICD-10-CM

## 2022-09-22 PROCEDURE — 1159F MED LIST DOCD IN RCRD: CPT | Mod: CPTII,S$GLB,, | Performed by: INTERNAL MEDICINE

## 2022-09-22 PROCEDURE — 3079F PR MOST RECENT DIASTOLIC BLOOD PRESSURE 80-89 MM HG: ICD-10-PCS | Mod: CPTII,S$GLB,, | Performed by: INTERNAL MEDICINE

## 2022-09-22 PROCEDURE — 3008F PR BODY MASS INDEX (BMI) DOCUMENTED: ICD-10-PCS | Mod: CPTII,S$GLB,, | Performed by: INTERNAL MEDICINE

## 2022-09-22 PROCEDURE — 3075F PR MOST RECENT SYSTOLIC BLOOD PRESS GE 130-139MM HG: ICD-10-PCS | Mod: CPTII,S$GLB,, | Performed by: INTERNAL MEDICINE

## 2022-09-22 PROCEDURE — 1159F PR MEDICATION LIST DOCUMENTED IN MEDICAL RECORD: ICD-10-PCS | Mod: CPTII,S$GLB,, | Performed by: INTERNAL MEDICINE

## 2022-09-22 PROCEDURE — 3008F BODY MASS INDEX DOCD: CPT | Mod: CPTII,S$GLB,, | Performed by: INTERNAL MEDICINE

## 2022-09-22 PROCEDURE — 3075F SYST BP GE 130 - 139MM HG: CPT | Mod: CPTII,S$GLB,, | Performed by: INTERNAL MEDICINE

## 2022-09-22 PROCEDURE — 99204 OFFICE O/P NEW MOD 45 MIN: CPT | Mod: S$GLB,,, | Performed by: INTERNAL MEDICINE

## 2022-09-22 PROCEDURE — 99999 PR PBB SHADOW E&M-EST. PATIENT-LVL III: CPT | Mod: PBBFAC,,, | Performed by: INTERNAL MEDICINE

## 2022-09-22 PROCEDURE — 99204 PR OFFICE/OUTPT VISIT, NEW, LEVL IV, 45-59 MIN: ICD-10-PCS | Mod: S$GLB,,, | Performed by: INTERNAL MEDICINE

## 2022-09-22 PROCEDURE — 99999 PR PBB SHADOW E&M-EST. PATIENT-LVL III: ICD-10-PCS | Mod: PBBFAC,,, | Performed by: INTERNAL MEDICINE

## 2022-09-22 PROCEDURE — 3079F DIAST BP 80-89 MM HG: CPT | Mod: CPTII,S$GLB,, | Performed by: INTERNAL MEDICINE

## 2022-09-22 RX ORDER — VALSARTAN 80 MG/1
80 TABLET ORAL DAILY
Qty: 30 TABLET | Refills: 11 | Status: SHIPPED | OUTPATIENT
Start: 2022-09-22 | End: 2022-11-04 | Stop reason: SDUPTHER

## 2022-09-26 ENCOUNTER — TELEPHONE (OUTPATIENT)
Dept: BARIATRICS | Facility: CLINIC | Age: 37
End: 2022-09-26
Payer: COMMERCIAL

## 2022-09-27 ENCOUNTER — TELEPHONE (OUTPATIENT)
Dept: BARIATRICS | Facility: CLINIC | Age: 37
End: 2022-09-27
Payer: COMMERCIAL

## 2022-09-27 NOTE — TELEPHONE ENCOUNTER
Attempted to reach pt,unable to LVM, mailbox full to schedule consultation appointments with JESSE and dietician, to discuss insurance requirements including BMI 40, 1. Have documentation of participation in a pre-operative weight loss program, which includes  both nutritional counseling and an exercise regimen, lasting a minimum of 6 months without  significant weight loss results. Must have participated in the program within the last 24 months. and introduce to bariatric program. Bariatric dashboard status updated to financial services phone appointment completed. Guarantor note copied into bariatric dashboard.

## 2022-09-29 ENCOUNTER — PATIENT MESSAGE (OUTPATIENT)
Dept: CARDIOLOGY | Facility: CLINIC | Age: 37
End: 2022-09-29
Payer: COMMERCIAL

## 2022-09-29 ENCOUNTER — LAB VISIT (OUTPATIENT)
Dept: LAB | Facility: HOSPITAL | Age: 37
End: 2022-09-29
Attending: INTERNAL MEDICINE
Payer: COMMERCIAL

## 2022-09-29 DIAGNOSIS — I10 PRIMARY HYPERTENSION: ICD-10-CM

## 2022-09-29 LAB
ANION GAP SERPL CALC-SCNC: 9 MMOL/L (ref 8–16)
BUN SERPL-MCNC: 10 MG/DL (ref 6–20)
CALCIUM SERPL-MCNC: 8.9 MG/DL (ref 8.7–10.5)
CHLORIDE SERPL-SCNC: 107 MMOL/L (ref 95–110)
CHOLEST SERPL-MCNC: 151 MG/DL (ref 120–199)
CHOLEST/HDLC SERPL: 3.3 {RATIO} (ref 2–5)
CO2 SERPL-SCNC: 20 MMOL/L (ref 23–29)
CREAT SERPL-MCNC: 0.9 MG/DL (ref 0.5–1.4)
EST. GFR  (NO RACE VARIABLE): >60 ML/MIN/1.73 M^2
GLUCOSE SERPL-MCNC: 97 MG/DL (ref 70–110)
HDLC SERPL-MCNC: 46 MG/DL (ref 40–75)
HDLC SERPL: 30.5 % (ref 20–50)
LDLC SERPL CALC-MCNC: 86.4 MG/DL (ref 63–159)
NONHDLC SERPL-MCNC: 105 MG/DL
POTASSIUM SERPL-SCNC: 3.7 MMOL/L (ref 3.5–5.1)
SODIUM SERPL-SCNC: 136 MMOL/L (ref 136–145)
TRIGL SERPL-MCNC: 93 MG/DL (ref 30–150)

## 2022-09-29 PROCEDURE — 80061 LIPID PANEL: CPT | Performed by: INTERNAL MEDICINE

## 2022-09-29 PROCEDURE — 80048 BASIC METABOLIC PNL TOTAL CA: CPT | Performed by: INTERNAL MEDICINE

## 2022-09-29 PROCEDURE — 36415 COLL VENOUS BLD VENIPUNCTURE: CPT | Performed by: INTERNAL MEDICINE

## 2022-09-30 ENCOUNTER — TELEPHONE (OUTPATIENT)
Dept: CARDIOLOGY | Facility: CLINIC | Age: 37
End: 2022-09-30
Payer: COMMERCIAL

## 2022-10-06 ENCOUNTER — PATIENT MESSAGE (OUTPATIENT)
Dept: BARIATRICS | Facility: CLINIC | Age: 37
End: 2022-10-06
Payer: COMMERCIAL

## 2022-10-17 ENCOUNTER — OFFICE VISIT (OUTPATIENT)
Dept: OBSTETRICS AND GYNECOLOGY | Facility: CLINIC | Age: 37
End: 2022-10-17
Payer: COMMERCIAL

## 2022-10-17 VITALS
WEIGHT: 245.94 LBS | DIASTOLIC BLOOD PRESSURE: 86 MMHG | BODY MASS INDEX: 39.69 KG/M2 | SYSTOLIC BLOOD PRESSURE: 142 MMHG

## 2022-10-17 DIAGNOSIS — N97.9 FEMALE INFERTILITY: Primary | ICD-10-CM

## 2022-10-17 PROCEDURE — 4010F ACE/ARB THERAPY RXD/TAKEN: CPT | Mod: CPTII,S$GLB,, | Performed by: STUDENT IN AN ORGANIZED HEALTH CARE EDUCATION/TRAINING PROGRAM

## 2022-10-17 PROCEDURE — 1160F RVW MEDS BY RX/DR IN RCRD: CPT | Mod: CPTII,S$GLB,, | Performed by: STUDENT IN AN ORGANIZED HEALTH CARE EDUCATION/TRAINING PROGRAM

## 2022-10-17 PROCEDURE — 99214 OFFICE O/P EST MOD 30 MIN: CPT | Mod: S$GLB,,, | Performed by: STUDENT IN AN ORGANIZED HEALTH CARE EDUCATION/TRAINING PROGRAM

## 2022-10-17 PROCEDURE — 3079F DIAST BP 80-89 MM HG: CPT | Mod: CPTII,S$GLB,, | Performed by: STUDENT IN AN ORGANIZED HEALTH CARE EDUCATION/TRAINING PROGRAM

## 2022-10-17 PROCEDURE — 4010F PR ACE/ARB THEARPY RXD/TAKEN: ICD-10-PCS | Mod: CPTII,S$GLB,, | Performed by: STUDENT IN AN ORGANIZED HEALTH CARE EDUCATION/TRAINING PROGRAM

## 2022-10-17 PROCEDURE — 3077F PR MOST RECENT SYSTOLIC BLOOD PRESSURE >= 140 MM HG: ICD-10-PCS | Mod: CPTII,S$GLB,, | Performed by: STUDENT IN AN ORGANIZED HEALTH CARE EDUCATION/TRAINING PROGRAM

## 2022-10-17 PROCEDURE — 1160F PR REVIEW ALL MEDS BY PRESCRIBER/CLIN PHARMACIST DOCUMENTED: ICD-10-PCS | Mod: CPTII,S$GLB,, | Performed by: STUDENT IN AN ORGANIZED HEALTH CARE EDUCATION/TRAINING PROGRAM

## 2022-10-17 PROCEDURE — 99214 PR OFFICE/OUTPT VISIT, EST, LEVL IV, 30-39 MIN: ICD-10-PCS | Mod: S$GLB,,, | Performed by: STUDENT IN AN ORGANIZED HEALTH CARE EDUCATION/TRAINING PROGRAM

## 2022-10-17 PROCEDURE — 1159F MED LIST DOCD IN RCRD: CPT | Mod: CPTII,S$GLB,, | Performed by: STUDENT IN AN ORGANIZED HEALTH CARE EDUCATION/TRAINING PROGRAM

## 2022-10-17 PROCEDURE — 3077F SYST BP >= 140 MM HG: CPT | Mod: CPTII,S$GLB,, | Performed by: STUDENT IN AN ORGANIZED HEALTH CARE EDUCATION/TRAINING PROGRAM

## 2022-10-17 PROCEDURE — 99999 PR PBB SHADOW E&M-EST. PATIENT-LVL III: CPT | Mod: PBBFAC,,, | Performed by: STUDENT IN AN ORGANIZED HEALTH CARE EDUCATION/TRAINING PROGRAM

## 2022-10-17 PROCEDURE — 3079F PR MOST RECENT DIASTOLIC BLOOD PRESSURE 80-89 MM HG: ICD-10-PCS | Mod: CPTII,S$GLB,, | Performed by: STUDENT IN AN ORGANIZED HEALTH CARE EDUCATION/TRAINING PROGRAM

## 2022-10-17 PROCEDURE — 1159F PR MEDICATION LIST DOCUMENTED IN MEDICAL RECORD: ICD-10-PCS | Mod: CPTII,S$GLB,, | Performed by: STUDENT IN AN ORGANIZED HEALTH CARE EDUCATION/TRAINING PROGRAM

## 2022-10-17 PROCEDURE — 99999 PR PBB SHADOW E&M-EST. PATIENT-LVL III: ICD-10-PCS | Mod: PBBFAC,,, | Performed by: STUDENT IN AN ORGANIZED HEALTH CARE EDUCATION/TRAINING PROGRAM

## 2022-10-17 NOTE — PROGRESS NOTES
History & Physical  Gynecology      SUBJECTIVE:     Chief Complaint: Female Infertility       History of Present Illness:  Kaylin is a 37 yr old  who presents for evaluation of secondary infertility. The patient has a history 2 prior term vaginal deliveries and she delivered her last child 18 years ago. She has been trying to conceive with her current  (not the father of her other children) for roughly 2 years. Her current partner has two children by another women, 24 and 26 years ago. She states that she has regular monthly periods and follows her periods and ovulation with an dee dee. They have tried some timed intercourse. Her partner has no known testicular trauma or problematic exposures. He has diabetes. The patient was diagnosed with chlamydia between the deliveries of her first and second child. She is taking Folate and her PMH is significant for HTN. She denies any pelvic surgeries.     Review of patient's allergies indicates:  No Known Allergies    Past Medical History:   Diagnosis Date    Acne     Anxiety disorder, unspecified 2020    Gastro-esophageal reflux disease without esophagitis 2020    Hypertension     Obesity      Past Surgical History:   Procedure Laterality Date    ESOPHAGOGASTRODUODENOSCOPY N/A 2020    Procedure: EGD (ESOPHAGOGASTRODUODENOSCOPY);  Surgeon: Ramiro Trejo Jr., MD;  [facility];  Service: Endoscopy;  Laterality: N/A;     OB History          2    Para   2    Term   2            AB        Living   2         SAB        IAB        Ectopic        Multiple        Live Births   2               Family History   Problem Relation Age of Onset    Diabetes Father     Hypertension Father     Hypertension Mother     Kidney disease Mother     Heart disease Mother     Crohn's disease Sister     Aneurysm Maternal Grandmother     Heart disease Maternal Grandfather     Heart disease Paternal Grandmother     Breast cancer Neg Hx     Colon cancer  Neg Hx     Ovarian cancer Neg Hx     Melanoma Neg Hx      Social History     Tobacco Use    Smoking status: Never    Smokeless tobacco: Never   Substance Use Topics    Alcohol use: Yes     Alcohol/week: 2.0 standard drinks     Types: 2 Shots of liquor per week     Comment: social    Drug use: No       Current Outpatient Medications   Medication Sig    GINGER ROOT-PYRIDOXINE HCL,B6, ORAL Take by mouth.    meclizine (ANTIVERT) 12.5 mg tablet Take 1 tablet (12.5 mg total) by mouth 3 (three) times daily as needed for Dizziness.    valsartan (DIOVAN) 80 MG tablet Take 1 tablet (80 mg total) by mouth once daily.     No current facility-administered medications for this visit.       Review of Systems:  Review of Systems   Constitutional:  Negative for chills, fatigue and fever.   HENT:  Negative for congestion.    Eyes:  Negative for visual disturbance.   Respiratory:  Negative for cough and shortness of breath.    Cardiovascular:  Negative for chest pain and palpitations.   Gastrointestinal:  Negative for abdominal distention, abdominal pain, constipation, diarrhea, nausea and vomiting.   Genitourinary:  Negative for difficulty urinating, dysuria, hematuria, vaginal bleeding and vaginal discharge.        Female infertility   Skin:  Negative for rash.   Neurological:  Negative for dizziness, seizures, light-headedness and headaches.   Hematological:  Does not bruise/bleed easily.   Psychiatric/Behavioral:  Negative for dysphoric mood. The patient is not nervous/anxious.       OBJECTIVE:     Physical Exam:  Vitals:    10/17/22 1135   BP: (!) 142/86      Physical Exam  Vitals and nursing note reviewed. Exam conducted with a chaperone present.   Constitutional:       General: She is not in acute distress.     Appearance: She is well-developed.   HENT:      Head: Normocephalic and atraumatic.   Eyes:      Pupils: Pupils are equal, round, and reactive to light.   Cardiovascular:      Rate and Rhythm: Normal rate and regular  rhythm.   Pulmonary:      Effort: Pulmonary effort is normal. No respiratory distress.   Abdominal:      General: There is no distension.      Palpations: Abdomen is soft. There is no mass.      Tenderness: There is no abdominal tenderness. There is no guarding.   Musculoskeletal:         General: Normal range of motion.      Cervical back: Normal range of motion and neck supple.   Skin:     General: Skin is warm and dry.   Neurological:      Mental Status: She is alert and oriented to person, place, and time.   Psychiatric:         Behavior: Behavior normal.         Thought Content: Thought content normal.         Judgment: Judgment normal.       ASSESSMENT/PLAN:     1. Female infertility  - Given 2 years of unprotected intercourse without conception, patient meets criteria for secondary infertility  - Extensively discussed female infertility and the multiple etiologies for the diagnosis, including ovulatory, tubal, uterine and male factors. Counseled patient that 15% of women experience infertility in their lifetime and that of those 15%, 30% will be diagnosed with unexplained infertility. Additionally stressed the importance of male factor evaluation as 40-50% of infertility can be explained by male factor diagnoses.   - Etiology of patient's infertility unknown at this time. Patient appears to be ovulating. Tubal factor is a possibility given history of Chlamydia. Cannot rule-out male factor. No clinical history to suggest uterine factor.   - Will also schedule TVUS for assessment of anatomical abnormality  - Male partner to undergo S.A., order and instructions given  - Patient to start OPKs.  - ANTIMULLERIAN HORMONE ([facility]); Future  - US Pelvis Complete Non OB; Future  - Progesterone; Future  - FOLLICLE STIMULATING HORMONE; Future  - Estradiol; Future    Yamil Yuen M.D.  OB/GYN  [facility]

## 2022-10-20 ENCOUNTER — PATIENT MESSAGE (OUTPATIENT)
Dept: OBSTETRICS AND GYNECOLOGY | Facility: CLINIC | Age: 37
End: 2022-10-20
Payer: COMMERCIAL

## 2022-11-29 ENCOUNTER — LAB VISIT (OUTPATIENT)
Dept: LAB | Facility: HOSPITAL | Age: 37
End: 2022-11-29
Attending: EMERGENCY MEDICINE
Payer: COMMERCIAL

## 2022-11-29 DIAGNOSIS — I10 PRIMARY HYPERTENSION: ICD-10-CM

## 2022-11-29 DIAGNOSIS — N97.9 FEMALE INFERTILITY: ICD-10-CM

## 2022-11-29 LAB
ANION GAP SERPL CALC-SCNC: 9 MMOL/L (ref 8–16)
BUN SERPL-MCNC: 10 MG/DL (ref 6–20)
CALCIUM SERPL-MCNC: 9.3 MG/DL (ref 8.7–10.5)
CHLORIDE SERPL-SCNC: 109 MMOL/L (ref 95–110)
CO2 SERPL-SCNC: 22 MMOL/L (ref 23–29)
CREAT SERPL-MCNC: 0.9 MG/DL (ref 0.5–1.4)
EST. GFR  (NO RACE VARIABLE): >60 ML/MIN/1.73 M^2
ESTRADIOL SERPL-MCNC: 107 PG/ML
FSH SERPL-ACNC: 2.06 MIU/ML
GLUCOSE SERPL-MCNC: 81 MG/DL (ref 70–110)
POTASSIUM SERPL-SCNC: 3.7 MMOL/L (ref 3.5–5.1)
PROGEST SERPL-MCNC: 3.4 NG/ML
SODIUM SERPL-SCNC: 140 MMOL/L (ref 136–145)

## 2022-11-29 PROCEDURE — 84144 ASSAY OF PROGESTERONE: CPT | Performed by: STUDENT IN AN ORGANIZED HEALTH CARE EDUCATION/TRAINING PROGRAM

## 2022-11-29 PROCEDURE — 36415 COLL VENOUS BLD VENIPUNCTURE: CPT | Performed by: STUDENT IN AN ORGANIZED HEALTH CARE EDUCATION/TRAINING PROGRAM

## 2022-11-29 PROCEDURE — 83001 ASSAY OF GONADOTROPIN (FSH): CPT | Performed by: STUDENT IN AN ORGANIZED HEALTH CARE EDUCATION/TRAINING PROGRAM

## 2022-11-29 PROCEDURE — 80048 BASIC METABOLIC PNL TOTAL CA: CPT | Performed by: EMERGENCY MEDICINE

## 2022-11-29 PROCEDURE — 82670 ASSAY OF TOTAL ESTRADIOL: CPT | Performed by: STUDENT IN AN ORGANIZED HEALTH CARE EDUCATION/TRAINING PROGRAM

## 2022-11-29 PROCEDURE — 83520 IMMUNOASSAY QUANT NOS NONAB: CPT | Performed by: STUDENT IN AN ORGANIZED HEALTH CARE EDUCATION/TRAINING PROGRAM

## 2022-11-30 LAB — MIS SERPL-MCNC: 1.2 NG/ML (ref 0.15–7.5)

## 2022-12-06 ENCOUNTER — PATIENT MESSAGE (OUTPATIENT)
Dept: OTOLARYNGOLOGY | Facility: CLINIC | Age: 37
End: 2022-12-06
Payer: COMMERCIAL

## 2022-12-06 ENCOUNTER — TELEPHONE (OUTPATIENT)
Dept: OTOLARYNGOLOGY | Facility: CLINIC | Age: 37
End: 2022-12-06
Payer: COMMERCIAL

## 2022-12-07 ENCOUNTER — OFFICE VISIT (OUTPATIENT)
Dept: OTOLARYNGOLOGY | Facility: CLINIC | Age: 37
End: 2022-12-07
Payer: COMMERCIAL

## 2022-12-07 ENCOUNTER — CLINICAL SUPPORT (OUTPATIENT)
Dept: AUDIOLOGY | Facility: CLINIC | Age: 37
End: 2022-12-07
Payer: COMMERCIAL

## 2022-12-07 VITALS — SYSTOLIC BLOOD PRESSURE: 142 MMHG | DIASTOLIC BLOOD PRESSURE: 88 MMHG | HEART RATE: 69 BPM

## 2022-12-07 DIAGNOSIS — H69.93 ETD (EUSTACHIAN TUBE DYSFUNCTION), BILATERAL: ICD-10-CM

## 2022-12-07 DIAGNOSIS — H90.11 CONDUCTIVE HEARING LOSS OF RIGHT EAR WITH UNRESTRICTED HEARING OF LEFT EAR: Primary | ICD-10-CM

## 2022-12-07 DIAGNOSIS — H93.8X3 SENSATION OF FULLNESS IN BOTH EARS: ICD-10-CM

## 2022-12-07 DIAGNOSIS — Z86.16 HISTORY OF COVID-19: ICD-10-CM

## 2022-12-07 DIAGNOSIS — R42 DIZZINESS: Primary | ICD-10-CM

## 2022-12-07 DIAGNOSIS — H51.9 ABNORMAL EYE MOVEMENTS: ICD-10-CM

## 2022-12-07 DIAGNOSIS — H92.03 OTALGIA OF BOTH EARS: ICD-10-CM

## 2022-12-07 DIAGNOSIS — R42 DIZZINESS: ICD-10-CM

## 2022-12-07 DIAGNOSIS — H90.11 CONDUCTIVE HEARING LOSS OF RIGHT EAR WITH UNRESTRICTED HEARING OF LEFT EAR: ICD-10-CM

## 2022-12-07 DIAGNOSIS — H93.13 TINNITUS OF BOTH EARS: ICD-10-CM

## 2022-12-07 PROCEDURE — 92557 PR COMPREHENSIVE HEARING TEST: ICD-10-PCS | Mod: S$GLB,,,

## 2022-12-07 PROCEDURE — 92557 COMPREHENSIVE HEARING TEST: CPT | Mod: S$GLB,,,

## 2022-12-07 PROCEDURE — 1160F RVW MEDS BY RX/DR IN RCRD: CPT | Mod: CPTII,S$GLB,, | Performed by: OTOLARYNGOLOGY

## 2022-12-07 PROCEDURE — 3077F SYST BP >= 140 MM HG: CPT | Mod: CPTII,S$GLB,, | Performed by: OTOLARYNGOLOGY

## 2022-12-07 PROCEDURE — 99999 PR PBB SHADOW E&M-EST. PATIENT-LVL III: CPT | Mod: PBBFAC,,, | Performed by: OTOLARYNGOLOGY

## 2022-12-07 PROCEDURE — 99999 PR PBB SHADOW E&M-EST. PATIENT-LVL I: ICD-10-PCS | Mod: PBBFAC,,,

## 2022-12-07 PROCEDURE — 3079F DIAST BP 80-89 MM HG: CPT | Mod: CPTII,S$GLB,, | Performed by: OTOLARYNGOLOGY

## 2022-12-07 PROCEDURE — 99999 PR PBB SHADOW E&M-EST. PATIENT-LVL I: CPT | Mod: PBBFAC,,,

## 2022-12-07 PROCEDURE — 99214 OFFICE O/P EST MOD 30 MIN: CPT | Mod: S$GLB,,, | Performed by: OTOLARYNGOLOGY

## 2022-12-07 PROCEDURE — 4010F ACE/ARB THERAPY RXD/TAKEN: CPT | Mod: CPTII,S$GLB,, | Performed by: OTOLARYNGOLOGY

## 2022-12-07 PROCEDURE — 3077F PR MOST RECENT SYSTOLIC BLOOD PRESSURE >= 140 MM HG: ICD-10-PCS | Mod: CPTII,S$GLB,, | Performed by: OTOLARYNGOLOGY

## 2022-12-07 PROCEDURE — 1160F PR REVIEW ALL MEDS BY PRESCRIBER/CLIN PHARMACIST DOCUMENTED: ICD-10-PCS | Mod: CPTII,S$GLB,, | Performed by: OTOLARYNGOLOGY

## 2022-12-07 PROCEDURE — 1159F MED LIST DOCD IN RCRD: CPT | Mod: CPTII,S$GLB,, | Performed by: OTOLARYNGOLOGY

## 2022-12-07 PROCEDURE — 92567 TYMPANOMETRY: CPT | Mod: S$GLB,,,

## 2022-12-07 PROCEDURE — 99214 PR OFFICE/OUTPT VISIT, EST, LEVL IV, 30-39 MIN: ICD-10-PCS | Mod: S$GLB,,, | Performed by: OTOLARYNGOLOGY

## 2022-12-07 PROCEDURE — 3079F PR MOST RECENT DIASTOLIC BLOOD PRESSURE 80-89 MM HG: ICD-10-PCS | Mod: CPTII,S$GLB,, | Performed by: OTOLARYNGOLOGY

## 2022-12-07 PROCEDURE — 99999 PR PBB SHADOW E&M-EST. PATIENT-LVL III: ICD-10-PCS | Mod: PBBFAC,,, | Performed by: OTOLARYNGOLOGY

## 2022-12-07 PROCEDURE — 1159F PR MEDICATION LIST DOCUMENTED IN MEDICAL RECORD: ICD-10-PCS | Mod: CPTII,S$GLB,, | Performed by: OTOLARYNGOLOGY

## 2022-12-07 PROCEDURE — 92567 PR TYMPA2METRY: ICD-10-PCS | Mod: S$GLB,,,

## 2022-12-07 PROCEDURE — 4010F PR ACE/ARB THEARPY RXD/TAKEN: ICD-10-PCS | Mod: CPTII,S$GLB,, | Performed by: OTOLARYNGOLOGY

## 2022-12-07 NOTE — PATIENT INSTRUCTIONS
Extensively reviewed potential causes of dizziness and vertigo.    Given some of her symptoms and exam findings, neurology evaluation recommended and order placed.    VNG scheduled. Instructions provided. Once results received will be in touch with recommendations.

## 2022-12-07 NOTE — PROGRESS NOTES
Kaylin Vargas, a 37 y.o. female, was seen today in the clinic for an audiologic evaluation.  The patient's main complaint was an off-balance dizziness that began after she had a sinus infection in July 2022.  Ms. Vargas reported the dizziness occurs when she bends her head down or when she is sitting still.  The patient reported bilateral otalgia that is worse in her right ear and aural fullness in both ears that typically occurs with weather changes.  Ms. Vargas also reported bilateral intermittent tinnitus.    Tympanometry revealed Type A in the right ear and Type A in the left ear.  Audiogram results revealed normal hearing sensitivity from 250-3000 Hz sloping to a mild conductive hearing loss at 4000 Hz rising back to normal hearing at 8000 Hz in the right ear and normal hearing sensitivity in the left ear.  Speech reception thresholds were noted at 15 dB in the right ear and 15 dB in the left ear.  Speech discrimination scores were 100% in the right ear and 100% in the left ear.    Recommendations:  Otologic evaluation  Hearing protection when in noise  Annual audiogram or sooner if change in hearing is perceived

## 2022-12-07 NOTE — PROGRESS NOTES
36 y/o female presents by self referral for evaluation of sensation of dizziness. Symptoms present for a few months. PCP checked blood work and HBP meds being adjusted. However trouble persists. Sensation started after having COVID in late July 2022. Feels unstable - like a rocking boat sensation. No spinning. Rocking occurs when in bed or while sitting. Meclizine helps but then symptoms return. Degree of symptom of rocking fluctuates in intensity. Hx of motion sickness. No change in hearing with this problem. R ear hearing has been less than left for a long time. She had lots of OM episodes as a child.   No ear surgery, ear trauma, head trauma, ear drainage, unilateral tinnitus, unilateral ear pressure, acute hearing loss associated with episodes of dizziness.  No known family hx of early hearing loss or ear problems.    PMHx, PSHx, Meds, Allergies, SocHx, FamHx reviewed in Epic and updated appropriate to this visit    Review of Systems     Constitutional: Positive for fatigue.  Negative for fever.      HENT: Positive for ear infection, ear pain, postnasal drip and sinus pressure.  Negative for ear discharge, hearing loss, runny nose, sinus infection, stuffy nose, trouble swallowing and voice change.      Eyes:  Positive for eye itching. Negative for eye drainage.     Respiratory:  Positive for cough. Negative for shortness of breath and wheezing.      Cardiovascular:  Positive for foot swelling. Negative for chest pain.     Gastrointestinal:  Positive for acid reflux and heartburn. Negative for abdominal pain.     Genitourinary: Negative for difficulty urinating, sexual problems and frequent urination.     Musc: Positive for neck pain. Negative for aching joints and back pain.     Skin: Negative for rash.     Allergy: Positive for seasonal allergies.     Endocrine: Positive for cold intolerance.     Neurological: Positive for dizziness, headaches and light-headedness.     Hematologic: Negative for bruises/bleeds  easily and swollen glands.      Psychiatric: Positive for decreased concentration, depression and nervous/anxious.     PE: BP (!) 142/88   Pulse 69   Gen: female, WN, WD, NAD, cooperative and alert, good historian  Eyes: no spontaneous nystagmus, PERRL  Ears: no cerumen with translucent TMs bilaterally, normal bony landmarks  Nose: external wnl, septum wnl, turbinates WNL, clear drainage, no visible polyps  OC/OP: tongue thick midline, teeth in good repair, MMM, uvula short with indentation on raise, posterior palate higher, tonsils symmetric, no erythema or exudate  Neck: no LAD or masses, no bruits  Face: no TTP over sinuses  Chest: equal chest excursion, no retrations  Skin: no visible concerning lesions of face, dry and intact  Lymph: no neck LAD  Neuro: CN II-VII, IX - XII intact, finger to nose testing brisk and intact, EOM in all directions but eyes do not move fluidly and jump from side to side, Romberg negative, no visible spontaneous nystagmus, no ataxia, Fukuda test normal  Psych: alert & oriented x 3, reasonable, normal affect    Audio    Audio reviewed with pt. R mild CHL. Good SRT & WRS. Good tymp movement    Impression:   1. Dizziness        2. History of COVID-19        3. Conductive hearing loss of right ear with unrestricted hearing of left ear        4. ETD (Eustachian tube dysfunction), bilateral (as child)        5. Abnormal eye movements            Discussion and Plan:       Extensively reviewed potential causes of dizziness and vertigo.    Given some of her symptoms and exam findings (more rocking sensation with static head while eyes move up and down, and non- fluid EOM movement), neurology evaluation recommended and order placed.    VNG scheduled. Instructions provided. Once results received will be in touch with recommendations.        Parts or all of this note were created by voice recognition software; typographical errors in translating may be present.

## 2022-12-12 ENCOUNTER — CLINICAL SUPPORT (OUTPATIENT)
Dept: AUDIOLOGY | Facility: CLINIC | Age: 37
End: 2022-12-12
Payer: COMMERCIAL

## 2022-12-12 DIAGNOSIS — R94.113 ABNORMAL OCULOMOTOR STUDY: ICD-10-CM

## 2022-12-12 DIAGNOSIS — H81.8X2 LEFT-SIDED VESTIBULAR WEAKNESS: Primary | ICD-10-CM

## 2022-12-12 PROCEDURE — 92540 BASIC VESTIBULAR EVALUATION: CPT | Mod: S$GLB,,,

## 2022-12-12 PROCEDURE — 92537 CALORIC VSTBLR TEST W/REC: CPT | Mod: S$GLB,,,

## 2022-12-12 PROCEDURE — 92540 PR VESTIBULAR EVAL NYSTAG FOVL&PERPH STIM OSCIL TRACKING: ICD-10-PCS | Mod: S$GLB,,,

## 2022-12-12 PROCEDURE — 92537 PR CALORIC VSTBLR TEST W/REC BITHERMAL: ICD-10-PCS | Mod: S$GLB,,,

## 2022-12-12 NOTE — Clinical Note
Yang Burris,  Please see the results of Mrs. Vargas's VNG testing, if you have any questions let me know.   Best, Keesha

## 2022-12-12 NOTE — PROGRESS NOTES
"VNG EVALUATION    Referring physician:  Dr. Burris    37 y.o. female complains of lightheadedness, imbalance and occasional sensation of "falling" when lying down in bed.  Symptoms began shortly after Mrs. Vargas had COVID-19 in 2022. No episodes of true vertigo have been reported; she would describe it as a rocking sensation and frequently feels "weighed down". These episodes are exacerbated by lying down in bed, bending over and occasionally when sitting still. No history of migraines or diabetes; though Mrs. Vargas reported experiencing more headaches than usual these past few months. Light and motion sensitivity; frequently has episodes of lightheadedness when driving in the car. History of hypertension; which is controlled with medication. Mrs. Vargas also takes Meclizine and has noticed minimal improvement in symptoms.     Meclizine was taken the night prior to this appointment; possible effects that Meclizine can have on vestibular testing was discussed with Mrs. Vargas. She preferred to still have testing completed today.     Audiometric testing was completed at prior appointment with Dr. Burris and revealed a mild high frequency conductive hearing loss in the right ear and normal hearing sensitivity in the left ear. Type A tympanograms were obtained bilaterally. Mrs. Vargas reported occasional non-bothersome tinnitus bilaterally. She also reported experiencing aural fullness in both ears as well as otalgia.     Gaze nystagmus was absent.  Oculomotor function was abnormal: smooth pursuit   Spontaneous nystagmus was absent    The head-hanging left Hallpike was negative.   The head-hanging right Hallpike was negative.   Static positional nystagmus was absent.    Unilateral weakness: 24% (left)  Directional preponderance 4% (left beating)  RW: 12 d/s  LW: 7 d/s  RC: 19 d/s   d/s  Fixation suppression was normal.    Impression: Oculomotor testing was normal; except for smooth pursuit. Caloric " testing revealed a left sided caloric weakness; which is indicative of a left peripheral vestibular abnormality. Results should be interpreted with caution due to patient having taken Meclizine the night prior to testing. All other tests were within normal limits.     Recommend:   A trial with [place] exercises; a copy was given to and reviewed with the patient  Consider referral to Neurology due to abnormal oculomotor testing  Consider referral for Vestibular Rehabilitation with a physical therapist if symptoms do not resolve   Follow-up with Dr. Burris to review the results of today's evaluation

## 2022-12-13 ENCOUNTER — PATIENT MESSAGE (OUTPATIENT)
Dept: OTOLARYNGOLOGY | Facility: CLINIC | Age: 37
End: 2022-12-13
Payer: COMMERCIAL

## 2022-12-20 ENCOUNTER — OFFICE VISIT (OUTPATIENT)
Dept: NEUROLOGY | Facility: CLINIC | Age: 37
End: 2022-12-20
Payer: COMMERCIAL

## 2022-12-20 DIAGNOSIS — R42 DIZZINESS: ICD-10-CM

## 2022-12-20 DIAGNOSIS — H51.9 ABNORMAL EYE MOVEMENTS: ICD-10-CM

## 2022-12-20 DIAGNOSIS — R42 DIZZINESS AND GIDDINESS: Primary | ICD-10-CM

## 2022-12-20 PROCEDURE — 99203 PR OFFICE/OUTPT VISIT, NEW, LEVL III, 30-44 MIN: ICD-10-PCS | Mod: 95,,, | Performed by: PSYCHIATRY & NEUROLOGY

## 2022-12-20 PROCEDURE — 99203 OFFICE O/P NEW LOW 30 MIN: CPT | Mod: 95,,, | Performed by: PSYCHIATRY & NEUROLOGY

## 2022-12-20 PROCEDURE — 4010F PR ACE/ARB THEARPY RXD/TAKEN: ICD-10-PCS | Mod: CPTII,95,, | Performed by: PSYCHIATRY & NEUROLOGY

## 2022-12-20 PROCEDURE — 4010F ACE/ARB THERAPY RXD/TAKEN: CPT | Mod: CPTII,95,, | Performed by: PSYCHIATRY & NEUROLOGY

## 2022-12-20 NOTE — PROGRESS NOTES
[facility] Department of Neurology  Virtual Visit      [facility] - NEUROLOGY [address]TH [place] [facility], [place]    Date: 12/20/22  Patient Name: Kaylin Vargas   MRN: [id number]   PCP: Jona Conley  Referring Provider: Yeni Burris MD    The patient location is: Home  The chief complaint leading to consultation is: Vertigo   Visit type: Virtual visit with synchronous audio and video  Total time spent with patient: 25 min  Each patient to whom he or she provides medical services by telemedicine is:  (1) informed of the relationship between the physician and patient and the respective role of any other health care provider with respect to management of the patient; and (2) notified that he or she may decline to receive medical services by telemedicine and may withdraw from such care at any time.    Notes:     Assessment:   Kaylin Vargas is a 37 y.o. female presenting for evaluation of vertigo. VNG suggestive of L sided peripheral etiology, however patient c/o diplopia and blurry vision not directly correlating with vertigo attacks. Obtaining MRI to rule out central cause. Patient using meclizine and referred to vestibular rehab by ENT.    Plan:     Problem List Items Addressed This Visit          Other    Dizziness     Other Visit Diagnoses       Dizziness and giddiness    -  Primary    Relevant Orders    MRI Brain Without Contrast    Abnormal eye movements                Ramu Jeter MD  [facility]   Department of Neurology    Patient note was created using MModal Dictation.  Any errors in syntax or even information may not have been identified and edited on initial review prior to signing this note.  Subjective:   Patient seen in consultation at the request of Yeni Burris MD for the evaluation of vertigo. A copy of this note will be sent to the referring physician.      HPI:   Ms. Kaylin Vargas is a 37 y.o. female presenting for evaluation of  chronic vertigo.  Patient has been evaluated by ENT after she presented with diplopia and chronic dizziness.  The patient reports that her dizziness is slightly controlled with meclizine and she is beginning vestibular rehab.  She states she does note intermittent diplopia even when she is not dizzy and believes her vision becomes blurry when she enters and exits bright versus dark rooms.  She denies any other focal neurologic deficits.  ENT is concerned about possible underlying central cause the VNG was indicative of underlying peripheral changes on the left.    PAST MEDICAL HISTORY:  Past Medical History:   Diagnosis Date    Acne     Anxiety disorder, unspecified 04/30/2020    Gastro-esophageal reflux disease without esophagitis 04/30/2020    Hypertension     Obesity        PAST SURGICAL HISTORY:  Past Surgical History:   Procedure Laterality Date    ESOPHAGOGASTRODUODENOSCOPY N/A 5/11/2020    Procedure: EGD (ESOPHAGOGASTRODUODENOSCOPY);  Surgeon: Ramiro Trejo Jr., MD;  Location: [facility];  Service: Endoscopy;  Laterality: N/A;       CURRENT MEDS:  Current Outpatient Medications   Medication Sig Dispense Refill    GINGER ROOT-PYRIDOXINE HCL,B6, ORAL Take by mouth.      meclizine (ANTIVERT) 12.5 mg tablet Take 1 tablet (12.5 mg total) by mouth 3 (three) times daily as needed for Dizziness. 30 tablet 0    valsartan (DIOVAN) 160 MG tablet Take 1 tablet (160 mg total) by mouth once daily. 30 tablet 1     No current facility-administered medications for this visit.       ALLERGIES:  Review of patient's allergies indicates:  No Known Allergies    FAMILY HISTORY:  Family History   Problem Relation Age of Onset    Diabetes Father     Hypertension Father     Hypertension Mother     Kidney disease Mother     Heart disease Mother     Crohn's disease Sister     Aneurysm Maternal Grandmother     Heart disease Maternal Grandfather     Heart disease Paternal Grandmother     Breast cancer Neg Hx     Colon cancer Neg Hx      Ovarian cancer Neg Hx     Melanoma Neg Hx        SOCIAL HISTORY:  Social History     Tobacco Use    Smoking status: Never    Smokeless tobacco: Never   Substance Use Topics    Alcohol use: Yes     Alcohol/week: 2.0 standard drinks     Types: 2 Shots of liquor per week     Comment: social    Drug use: No       Review of Systems:  12 system review of systems is negative except for the symptoms mentioned in HPI.      Objective:   There were no vitals filed for this visit.  General: NAD, well nourished   Eyes: no tearing, discharge, no erythema   ENT: moist mucous membranes of the oral cavity, nares patent    Neck: Supple, full range of motion  Cardiovascular: Appears well perfused  Lungs: Normal work of breathing, normal chest wall excursions  Skin: No rash, lesions, or breakdown on exposed skin  Psychiatry: Mood and affect are appropriate   Abdomen: nondistended, non tender  Extremeties: No cyanosis, clubbing or edema visible    Neurological   MENTAL STATUS: Alert and oriented to person, place, and time. Attention and concentration within normal limits. Speech without dysarthria. Recent and remote memory within normal limits   CRANIAL NERVES: Visual fields intact.  EOMI. Facial sensation intact. Face symmetrical. Hearing grossly intact. Full shoulder shrug bilaterally. Tongue protrudes midline   SENSORY: Sensation is intact to light touch throughout.  MOTOR: Normal bulk.  Moves all extremities symmetrically.  REFLEXES: Deferred due to virtual visit  CEREBELLAR/COORDINATION/GAIT: Gait steady Normal rapid alternating movements.

## 2023-01-03 ENCOUNTER — PATIENT MESSAGE (OUTPATIENT)
Dept: BARIATRICS | Facility: CLINIC | Age: 38
End: 2023-01-03
Payer: COMMERCIAL

## 2023-01-11 DIAGNOSIS — M79.642 BILATERAL HAND PAIN: Primary | ICD-10-CM

## 2023-01-11 DIAGNOSIS — M79.641 BILATERAL HAND PAIN: Primary | ICD-10-CM

## 2023-01-14 ENCOUNTER — OFFICE VISIT (OUTPATIENT)
Dept: URGENT CARE | Facility: CLINIC | Age: 38
End: 2023-01-14
Payer: COMMERCIAL

## 2023-01-14 VITALS
DIASTOLIC BLOOD PRESSURE: 101 MMHG | HEART RATE: 78 BPM | OXYGEN SATURATION: 98 % | BODY MASS INDEX: 39.37 KG/M2 | SYSTOLIC BLOOD PRESSURE: 153 MMHG | TEMPERATURE: 99 F | WEIGHT: 245 LBS | RESPIRATION RATE: 18 BRPM | HEIGHT: 66 IN

## 2023-01-14 DIAGNOSIS — K21.9 GASTROESOPHAGEAL REFLUX DISEASE, UNSPECIFIED WHETHER ESOPHAGITIS PRESENT: ICD-10-CM

## 2023-01-14 DIAGNOSIS — J06.9 VIRAL URI WITH COUGH: Primary | ICD-10-CM

## 2023-01-14 DIAGNOSIS — J02.9 SORE THROAT: ICD-10-CM

## 2023-01-14 LAB
CTP QC/QA: YES
CTP QC/QA: YES
MOLECULAR STREP A: NEGATIVE
SARS-COV-2 AG RESP QL IA.RAPID: NEGATIVE

## 2023-01-14 PROCEDURE — 99213 PR OFFICE/OUTPT VISIT, EST, LEVL III, 20-29 MIN: ICD-10-PCS | Mod: S$GLB,,,

## 2023-01-14 PROCEDURE — 3077F SYST BP >= 140 MM HG: CPT | Mod: CPTII,S$GLB,,

## 2023-01-14 PROCEDURE — 3077F PR MOST RECENT SYSTOLIC BLOOD PRESSURE >= 140 MM HG: ICD-10-PCS | Mod: CPTII,S$GLB,,

## 2023-01-14 PROCEDURE — 87811 SARS CORONAVIRUS 2 ANTIGEN POCT, MANUAL READ: ICD-10-PCS | Mod: QW,S$GLB,,

## 2023-01-14 PROCEDURE — 1160F RVW MEDS BY RX/DR IN RCRD: CPT | Mod: CPTII,S$GLB,,

## 2023-01-14 PROCEDURE — 87811 SARS-COV-2 COVID19 W/OPTIC: CPT | Mod: QW,S$GLB,,

## 2023-01-14 PROCEDURE — 3008F BODY MASS INDEX DOCD: CPT | Mod: CPTII,S$GLB,,

## 2023-01-14 PROCEDURE — 1159F PR MEDICATION LIST DOCUMENTED IN MEDICAL RECORD: ICD-10-PCS | Mod: CPTII,S$GLB,,

## 2023-01-14 PROCEDURE — 3080F PR MOST RECENT DIASTOLIC BLOOD PRESSURE >= 90 MM HG: ICD-10-PCS | Mod: CPTII,S$GLB,,

## 2023-01-14 PROCEDURE — 87651 POCT STREP A MOLECULAR: ICD-10-PCS | Mod: QW,S$GLB,,

## 2023-01-14 PROCEDURE — 87651 STREP A DNA AMP PROBE: CPT | Mod: QW,S$GLB,,

## 2023-01-14 PROCEDURE — 3080F DIAST BP >= 90 MM HG: CPT | Mod: CPTII,S$GLB,,

## 2023-01-14 PROCEDURE — 1159F MED LIST DOCD IN RCRD: CPT | Mod: CPTII,S$GLB,,

## 2023-01-14 PROCEDURE — 3008F PR BODY MASS INDEX (BMI) DOCUMENTED: ICD-10-PCS | Mod: CPTII,S$GLB,,

## 2023-01-14 PROCEDURE — 1160F PR REVIEW ALL MEDS BY PRESCRIBER/CLIN PHARMACIST DOCUMENTED: ICD-10-PCS | Mod: CPTII,S$GLB,,

## 2023-01-14 PROCEDURE — 99213 OFFICE O/P EST LOW 20 MIN: CPT | Mod: S$GLB,,,

## 2023-01-14 NOTE — PROGRESS NOTES
"Subjective:       Patient ID: Kaylin Vargas is a 37 y.o. female.    Vitals:  height is 5' 6" (1.676 m) and weight is 111.1 kg (245 lb). Her temperature is 98.6 °F (37 °C). Her blood pressure is 153/101 (abnormal) and her pulse is 78. Her respiration is 18 and oxygen saturation is 98%.     Chief Complaint: Sore Throat (Entered by patient)    Pt presents a sore throat. Symptoms started 4 days ago. Symptoms are unchanged. Pain with swallowing. OTC medication taken with mild relief. Complaining of headaches. Mild congestion. She took an at home covid test this morning that was negative. Requesting that we do another one.     Sore Throat   This is a new problem. Episode onset: 4 days. The problem has been unchanged. There has been no fever. The pain is at a severity of 6/10. The pain is mild. Associated symptoms include congestion, coughing, ear pain, a hoarse voice, swollen glands and trouble swallowing. Pertinent negatives include no ear discharge, plugged ear sensation, neck pain or vomiting. She has had no exposure to strep or mono. She has tried acetaminophen for the symptoms. The treatment provided mild relief.   Constitution: Negative for sweating and fatigue.   HENT:  Positive for ear pain, congestion, sore throat and trouble swallowing. Negative for ear discharge, sinus pain and sinus pressure.    Neck: Negative for neck pain.   Eyes:  Negative for eye pain and eye redness.   Respiratory:  Positive for cough.    Gastrointestinal:  Negative for nausea, vomiting and constipation.   Skin:  Negative for hives.   Allergic/Immunologic: Negative for hives and itching.   Neurological:  Negative for disorientation and altered mental status.   Psychiatric/Behavioral:  Negative for altered mental status and disorientation.      Objective:      Physical Exam   Constitutional: She is oriented to person, place, and time. She appears well-developed. She is cooperative.  Non-toxic appearance. She does not appear ill. " No distress.      Comments:Patient sits comfortably in exam chair. Answers questions in complete sentences. Does not show any signs of distress or discoloration.        HENT:   Head: Normocephalic and atraumatic.   Ears:   Right Ear: Hearing, tympanic membrane, external ear and ear canal normal.   Left Ear: Hearing, tympanic membrane, external ear and ear canal normal.   Nose: Congestion present. No mucosal edema, rhinorrhea or nasal deformity. No epistaxis. Right sinus exhibits no maxillary sinus tenderness and no frontal sinus tenderness. Left sinus exhibits no maxillary sinus tenderness and no frontal sinus tenderness.   Mouth/Throat: Uvula is midline and mucous membranes are normal. No trismus in the jaw. Normal dentition. No uvula swelling. Posterior oropharyngeal erythema present. No oropharyngeal exudate or posterior oropharyngeal edema.   Eyes: Conjunctivae and lids are normal. No scleral icterus.   Neck: Trachea normal and phonation normal. Neck supple. No edema present. No erythema present. No neck rigidity present.   Cardiovascular: Normal rate, regular rhythm, normal heart sounds and normal pulses.   Pulmonary/Chest: Effort normal and breath sounds normal. No stridor. No respiratory distress. She has no decreased breath sounds. She has no wheezes. She has no rhonchi. She has no rales.   Abdominal: Normal appearance.   Musculoskeletal: Normal range of motion.         General: No deformity. Normal range of motion.   Lymphadenopathy:     She has no cervical adenopathy.        Right cervical: No superficial cervical, no deep cervical and no posterior cervical adenopathy present.       Left cervical: No superficial cervical, no deep cervical and no posterior cervical adenopathy present.   Neurological: She is alert and oriented to person, place, and time. She exhibits normal muscle tone. Coordination normal.   Skin: Skin is warm, dry, intact, not diaphoretic and not pale.   Psychiatric: Her speech is normal  and behavior is normal. Judgment and thought content normal.   Nursing note and vitals reviewed.      Results for orders placed or performed in visit on 01/14/23   POCT Strep A, Molecular   Result Value Ref Range    Molecular Strep A, POC Negative Negative     Acceptable Yes        Assessment:       1. Viral URI with cough    2. Sore throat    3. Gastroesophageal reflux disease, unspecified whether esophagitis present          Plan:         Viral URI with cough  -     (Magic mouthwash) 1:1:1 diphenhydrAMINE(Benadryl) 12.5mg/5ml liq, aluminum & magnesium hydroxide-simethicone (Maalox), LIDOcaine viscous 2%; Swish and spit 15 mLs every 4 (four) hours as needed (sore throat). Sore throat  Dispense: 180 mL; Refill: 0    Sore throat  -     POCT Strep A, Molecular  -     SARS Coronavirus 2 Antigen, POCT Manual Read    Gastroesophageal reflux disease, unspecified whether esophagitis present                   Patient Instructions   - Start pepcid OTC for acid reflux.      - Warm salt water gargles can help with sore throat     - Warm tea with honey can help with sore throat and cough. Honey is a natural cough suppressant.         - You must understand that you have received an Urgent Care treatment only and that you may be released before all of your medical problems are known or treated.   - You, the patient, will arrange for follow up care as instructed.   - If your condition worsens or fails to improve we recommend that you receive another evaluation at the ER immediately or contact your PCP to discuss your concerns or return here.   - Follow up with your PCP or specialty clinic as directed in the next 1-2 weeks if not improved or as needed.  You can call [phone] to schedule an appointment with the appropriate provider.    If your symptoms do not improve or worsen, go to the emergency room immediately.

## 2023-01-14 NOTE — PATIENT INSTRUCTIONS
- Start pepcid OTC for acid reflux.      - Warm salt water gargles can help with sore throat     - Warm tea with honey can help with sore throat and cough. Honey is a natural cough suppressant.         - You must understand that you have received an Urgent Care treatment only and that you may be released before all of your medical problems are known or treated.   - You, the patient, will arrange for follow up care as instructed.   - If your condition worsens or fails to improve we recommend that you receive another evaluation at the ER immediately or contact your PCP to discuss your concerns or return here.   - Follow up with your PCP or specialty clinic as directed in the next 1-2 weeks if not improved or as needed.  You can call [phone] to schedule an appointment with the appropriate provider.    If your symptoms do not improve or worsen, go to the emergency room immediately.

## 2023-01-17 ENCOUNTER — HOSPITAL ENCOUNTER (OUTPATIENT)
Dept: RADIOLOGY | Facility: HOSPITAL | Age: 38
Discharge: HOME | End: 2023-01-17
Attending: ORTHOPAEDIC SURGERY
Payer: COMMERCIAL

## 2023-01-17 ENCOUNTER — OFFICE VISIT (OUTPATIENT)
Dept: ORTHOPEDICS | Facility: CLINIC | Age: 38
End: 2023-01-17
Payer: COMMERCIAL

## 2023-01-17 VITALS — BODY MASS INDEX: 39.37 KG/M2 | HEIGHT: 66 IN | WEIGHT: 245 LBS

## 2023-01-17 DIAGNOSIS — M79.641 BILATERAL HAND PAIN: ICD-10-CM

## 2023-01-17 DIAGNOSIS — M79.642 BILATERAL HAND PAIN: ICD-10-CM

## 2023-01-17 DIAGNOSIS — G56.03 BILATERAL CARPAL TUNNEL SYNDROME: Primary | ICD-10-CM

## 2023-01-17 PROCEDURE — 73130 XR HAND COMPLETE 3 VIEWS BILATERAL: ICD-10-PCS | Mod: 26,50,, | Performed by: INTERNAL MEDICINE

## 2023-01-17 PROCEDURE — 1159F MED LIST DOCD IN RCRD: CPT | Mod: CPTII,S$GLB,, | Performed by: ORTHOPAEDIC SURGERY

## 2023-01-17 PROCEDURE — 99999 PR PBB SHADOW E&M-EST. PATIENT-LVL III: ICD-10-PCS | Mod: PBBFAC,,, | Performed by: ORTHOPAEDIC SURGERY

## 2023-01-17 PROCEDURE — 99999 PR PBB SHADOW E&M-EST. PATIENT-LVL III: CPT | Mod: PBBFAC,,, | Performed by: ORTHOPAEDIC SURGERY

## 2023-01-17 PROCEDURE — 3008F PR BODY MASS INDEX (BMI) DOCUMENTED: ICD-10-PCS | Mod: CPTII,S$GLB,, | Performed by: ORTHOPAEDIC SURGERY

## 2023-01-17 PROCEDURE — 73130 X-RAY EXAM OF HAND: CPT | Mod: TC,50

## 2023-01-17 PROCEDURE — 99204 PR OFFICE/OUTPT VISIT, NEW, LEVL IV, 45-59 MIN: ICD-10-PCS | Mod: S$GLB,,, | Performed by: ORTHOPAEDIC SURGERY

## 2023-01-17 PROCEDURE — 3008F BODY MASS INDEX DOCD: CPT | Mod: CPTII,S$GLB,, | Performed by: ORTHOPAEDIC SURGERY

## 2023-01-17 PROCEDURE — 73130 X-RAY EXAM OF HAND: CPT | Mod: 26,50,, | Performed by: INTERNAL MEDICINE

## 2023-01-17 PROCEDURE — 1159F PR MEDICATION LIST DOCUMENTED IN MEDICAL RECORD: ICD-10-PCS | Mod: CPTII,S$GLB,, | Performed by: ORTHOPAEDIC SURGERY

## 2023-01-17 PROCEDURE — 99204 OFFICE O/P NEW MOD 45 MIN: CPT | Mod: S$GLB,,, | Performed by: ORTHOPAEDIC SURGERY

## 2023-01-17 NOTE — PROGRESS NOTES
[facility]  History & Physical  Orthopedics    SUBJECTIVE:      COVID-19 attestation:  This patient was treated during the COVID-19 pandemic.  This was discussed with the patient, they are aware of our current policies and procedures, were given the option of delaying their visit and or switching to a virtual visit, delaying their surgery when applicable, and they elect to proceed.    Chief Complaint:  Bilateral hand pain numbness and tingling    Referring Provider: Jona Conley Jr., MD     History of Present Illness:  Patient is a 37 y.o. right hand dominant female who presents today with complaints of bilateral hand pain numbness and tingling.  The patient notes that this has been happening for the past month or so.  She notes that this is a recurrence from 2 years ago when she initially had symptoms which then improved with work changes.  She notes that it is really the entirety of her bilateral hands which is involved in the right greater than left hands.  She denies any other issues today presents for initial evaluation.     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    Onset of symptoms/DOI was 1 month ago.    Symptoms are aggravated by activity, movement, and at night.    Symptoms are alleviated by rest.    Symptoms consist of pain and numbness/tingling.    The patient rates their pain as a 8/10.    Attempted treatment(s) and/or interventions include activity modifications, rest  The patient denies any fevers, chills, N/V, D/C and presents for evaluation.       Past Medical History:   Diagnosis Date    Acne     Anxiety disorder, unspecified 04/30/2020    Gastro-esophageal reflux disease without esophagitis 04/30/2020    Hypertension     Obesity      Past Surgical History:   Procedure Laterality Date    ESOPHAGOGASTRODUODENOSCOPY N/A 5/11/2020    Procedure: EGD (ESOPHAGOGASTRODUODENOSCOPY);  Surgeon: Ramiro Trejo Jr., MD;  Location: [facility];  Service:  "Endoscopy;  Laterality: N/A;     Review of patient's allergies indicates:  No Known Allergies  Social History     Social History Narrative    . Adolescent children.      Family History   Problem Relation Age of Onset    Diabetes Father     Hypertension Father     Hypertension Mother     Kidney disease Mother     Heart disease Mother     Crohn's disease Sister     Aneurysm Maternal Grandmother     Heart disease Maternal Grandfather     Heart disease Paternal Grandmother     Breast cancer Neg Hx     Colon cancer Neg Hx     Ovarian cancer Neg Hx     Melanoma Neg Hx          Current Outpatient Medications:     (Magic mouthwash) 1:1:1 diphenhydrAMINE(Benadryl) 12.5mg/5ml liq, aluminum & magnesium hydroxide-simethicone (Maalox), LIDOcaine viscous 2%, Swish and spit 15 mLs every 4 (four) hours as needed (sore throat). Sore throat, Disp: 180 mL, Rfl: 0    GINGER ROOT-PYRIDOXINE HCL,B6, ORAL, Take by mouth., Disp: , Rfl:     meclizine (ANTIVERT) 12.5 mg tablet, Take 1 tablet (12.5 mg total) by mouth 3 (three) times daily as needed for Dizziness., Disp: 30 tablet, Rfl: 0    valsartan (DIOVAN) 160 MG tablet, Take 1 tablet (160 mg total) by mouth once daily., Disp: 30 tablet, Rfl: 1      Review of Systems:  As per HPI otherwise noncontributory    OBJECTIVE:      Vital Signs (Most Recent):  Vitals:    01/17/23 1106   Weight: 111.1 kg (245 lb)   Height: 5' 6" (1.676 m)     Body mass index is 39.54 kg/m².      Physical Exam:  Constitutional: The patient appears well-developed and well-nourished. No distress.   Skin: No lesions appreciated  Head: Normocephalic and atraumatic.   Nose: Nose normal.   Ears: No deformities seen  Eyes: Conjunctivae and EOM are normal.   Neck: No tracheal deviation present.   Cardiovascular: Normal rate and intact distal pulses.    Pulmonary/Chest: Effort normal. No respiratory distress.   Abdominal: There is no guarding.   Neurological: The patient is alert.   Psychiatric: The patient has a " normal mood and affect.     Bilateral Hand/Wrist Examination:    Observation/Inspection:  Swelling  none    Deformity  none  Discoloration  none     Scars   none    Atrophy  none    HAND/WRIST EXAMINATION:  Finkelstein's Test   Neg  WHAT Test    Neg  Snuff box tenderness   Neg  Mckeon's Test    Neg  Hook of Hamate Tenderness  Neg  CMC grind    Neg  Circumduction test   Neg    Neurovascular Exam:  Digits WWP, brisk CR < 3s throughout  NVI motor/LTS to M/R/U nerves, radial pulse 2+  Tinel's Test - Carpal Tunnel  positive  Tinel's Test - Cubital Tunnel  Neg  Phalen's Test    Neg  Median Nerve Compression Test positive    ROM hand full, painless    ROM wrist full, painless    ROM elbow full, painless    Abdomen not guarded  Respirations nonlabored  Perfusion intact    Diagnostic Results:     Imaging - I independently viewed the patient's imaging as well as the radiology report.  Xrays of the patient's bilateral hands  demonstrate no evidence of any acute fractures or dislocations or significant degenerative changes.    EMG - none    ASSESSMENT/PLAN:      37 y.o. yo female with bilateral carpal tunnel syndrome  Plan: The patient and I had a thorough discussion today.  We discussed the working diagnosis as well as several other potential alternative diagnoses.  Treatment options were discussed, both conservative and surgical.  Conservative treatment options would include things such as activity modifications, workplace modifications, a period of rest, oral vs topical OTC and prescription anti-inflammatory medications, occupational therapy, splinting/bracing, immobilization, corticosteroid injections, and others.  Surgical options were discussed as well.     At this time, the patient would like to proceed with a trial of nocturnal carpal tunnel braces bilaterally as well as anti-inflammatory medications over-the-counter I would like to obtain an EMG to evaluate for bilateral carpal tunnel syndrome.  Follow-up after EMG for  additional treatments moving forward.    Should the patient's symptoms worsen, persist, or fail to improve they should return for reevaluation and I would be happy to see them back anytime.        Yrn Richards M.D.    Please be aware that this note has been generated with the assistance of [employer] voice-to-text.  Please excuse any spelling or grammatical errors.    Thank you for choosing Dr. Yrn Richards for your orthopedic hand and upper extremity care. It is our goal to provide you with exceptional care that will help keep you healthy, active, and get you back in the game.     If you felt that you received exemplary care today, please consider leaving feedback for Dr. Richards on [email or web address] at [email or web address].    Please do not hesitate to reach out to us via email, phone, or MyChart with any questions, concerns, or feedback.

## 2023-01-18 DIAGNOSIS — R42 DIZZINESS OF UNKNOWN CAUSE: ICD-10-CM

## 2023-01-19 RX ORDER — MECLIZINE HCL 12.5 MG 12.5 MG/1
12.5 TABLET ORAL 3 TIMES DAILY PRN
Qty: 30 TABLET | Refills: 0 | Status: SHIPPED | OUTPATIENT
Start: 2023-01-19 | End: 2023-03-07 | Stop reason: SDUPTHER

## 2023-01-19 NOTE — TELEPHONE ENCOUNTER
No new care gaps identified.  [facility] Embedded Care Gaps. Reference number: [id number]. 1/18/2023   9:41:12 PM CST

## 2023-02-06 ENCOUNTER — IMMUNIZATION (OUTPATIENT)
Dept: INTERNAL MEDICINE | Facility: CLINIC | Age: 38
End: 2023-02-06
Payer: COMMERCIAL

## 2023-02-06 DIAGNOSIS — Z23 NEED FOR VACCINATION: Primary | ICD-10-CM

## 2023-02-06 PROCEDURE — 91312 COVID-19, MRNA, LNP-S, BIVALENT BOOSTER, PF, 30 MCG/0.3 ML DOSE: ICD-10-PCS | Mod: S$GLB,,, | Performed by: INTERNAL MEDICINE

## 2023-02-06 PROCEDURE — 91312 COVID-19, MRNA, LNP-S, BIVALENT BOOSTER, PF, 30 MCG/0.3 ML DOSE: CPT | Mod: S$GLB,,, | Performed by: INTERNAL MEDICINE

## 2023-02-06 PROCEDURE — 0124A COVID-19, MRNA, LNP-S, BIVALENT BOOSTER, PF, 30 MCG/0.3 ML DOSE: CPT | Mod: CV19,PBBFAC | Performed by: INTERNAL MEDICINE

## 2023-02-15 ENCOUNTER — PROCEDURE VISIT (OUTPATIENT)
Dept: NEUROLOGY | Facility: CLINIC | Age: 38
End: 2023-02-15
Payer: COMMERCIAL

## 2023-02-15 DIAGNOSIS — G56.03 BILATERAL CARPAL TUNNEL SYNDROME: ICD-10-CM

## 2023-02-15 PROCEDURE — 95886 PR EMG COMPLETE, W/ NERVE CONDUCTION STUDIES, 5+ MUSCLES: ICD-10-PCS | Mod: S$GLB,,, | Performed by: PHYSICAL MEDICINE & REHABILITATION

## 2023-02-15 PROCEDURE — 95911 PR NERVE CONDUCTION STUDY; 9-10 STUDIES: ICD-10-PCS | Mod: S$GLB,,, | Performed by: PHYSICAL MEDICINE & REHABILITATION

## 2023-02-15 PROCEDURE — 95886 MUSC TEST DONE W/N TEST COMP: CPT | Mod: S$GLB,,, | Performed by: PHYSICAL MEDICINE & REHABILITATION

## 2023-02-15 PROCEDURE — 95911 NRV CNDJ TEST 9-10 STUDIES: CPT | Mod: S$GLB,,, | Performed by: PHYSICAL MEDICINE & REHABILITATION

## 2023-02-15 PROCEDURE — 95885 MUSC TST DONE W/NERV TST LIM: CPT | Mod: S$GLB,,, | Performed by: PHYSICAL MEDICINE & REHABILITATION

## 2023-02-15 PROCEDURE — 95885 PR MUSC TST DONE W/NERV TST LIM: ICD-10-PCS | Mod: S$GLB,,, | Performed by: PHYSICAL MEDICINE & REHABILITATION

## 2023-02-15 NOTE — PROCEDURES
Test Date:  2/15/2023    Patient: Kaylin Vargas : 1985 Physician: Сергей Cuadra D.O.   ID#: [id number] SEX: Female Ref. Phys: Yrn Richards MD     HPI: Kaylin Vargas is a 37 y.o.female who presents for NCS/EMG to evaluate CTS bilaterally.      NCV & EMG Findings:  Evaluation of the left median motor and the right median motor nerves showed prolonged distal onset latency.  The left median sensory nerve showed prolonged distal peak latency and decreased conduction velocity.  The right median sensory nerve showed prolonged distal peak latency, reduced amplitude, and decreased conduction velocity.  All remaining nerves (as indicated in the following tables) were within normal limits.  Needle evaluation of the left Abductor Pollicis Brevis muscle showed moderately increased polyphasic potentials.  All remaining muscles (as indicated in the following table) showed no evidence of electrical instability.    Impression:  There is electrophysiologic evidence of a bilateral sensorimotor median mononeuropathy across the wrist (I.e. Carpal tunnel syndrome).  There is no motor axonal loss.  There is no active denervation.  This is graded as Moderate in severity bilaterally.        ___________________________  Сергей Cuadra D.O.        NCS+  Motor Nerve Results      Latency Amplitude F-Lat Segment Distance CV Comment   Site (ms) Norm (mV) Norm (ms)  (cm) (m/s) Norm    Left Median (APB)   Wrist *5.0  < 4.4 7.8  > 5.9  Wrist-Palm - - -    Elbow 9.1 - 5.8 -  Elbow-Wrist 23 56  > 53    Right Median (APB)   Wrist *4.8  < 4.4 7.0  > 5.9  Wrist-Palm - - -    Elbow 8.7 - 6.9 -  Elbow-Wrist 25 64  > 53    Left Ulnar (ADM)   Wrist 2.2  < 3.7 7.5  > 3.0         Bel Elbow 5.6 - 5.9 -  Bel Elbow-Wrist 24 71  > 52    Abv Elbow 6.9 - 8.0 -  Abv Elbow-Bel Elbow 10 77  > 43    Right Ulnar (ADM)   Wrist 2.2  < 3.7 8.6  > 3.0         Bel Elbow 5.6 - 6.9 -  Bel Elbow-Wrist 24 71  > 52    Abv Elbow 6.8 - 8.3 -  Abv  Elbow-Bel Elbow 11 92  > 43      Sensory Nerve Results      Latency (Peak) Amplitude (P-P) Segment Distance CV Comment   Site (ms) Norm (µV) Norm  (cm) (m/s) Norm    Left Median   Wrist-Dig II *5.0  < 4.0 14  > 13 Wrist-Dig II 15 *30  > 39    Right Median   Wrist-Dig II *5.0  < 4.0 *7  > 13 Wrist-Dig II 14 *28  > 39    Left Ulnar   Wrist-Dig V 2.5  < 4.0 49  > 8 Wrist-Dig V 14 56  > 38    Right Ulnar   Wrist-Dig V 2.6  < 4.0 60  > 8 Wrist-Dig V 14 54  > 38    Left Radial   Forearm-Wrist 1.60  < 2.8 36  > 11 Forearm-Wrist 9 56 -    Right Radial   Forearm-Wrist 1.63  < 2.8 36  > 11 Forearm-Wrist 10 61 -      EMG+     Side Muscle Nerve Root Ins Act Fibs Psw Amp Dur Poly Recrt Int Pat Comment   Left Deltoid Axillary C5-C6 Nml Nml Nml Nml Nml 0 Nml Nml    Left Biceps Musculocut C5-C6 Nml Nml Nml Nml Nml 0 Nml Nml    Left Triceps Radial C6-C8 Nml Nml Nml Nml Nml 0 Nml Nml    Left Pronator Teres Median C6-C7 Nml Nml Nml Nml Nml 0 Nml Nml    Left APB Median C8-T1 Nml Nml Nml Nml Nml *2+ Nml Nml    Right APB Median C8-T1 Nml Nml Nml Nml Nml 0 Nml Nml            Waveforms:    Motor              Sensory

## 2023-03-01 ENCOUNTER — CLINICAL SUPPORT (OUTPATIENT)
Dept: OTHER | Facility: CLINIC | Age: 38
End: 2023-03-01

## 2023-03-01 DIAGNOSIS — Z00.8 ENCOUNTER FOR OTHER GENERAL EXAMINATION: ICD-10-CM

## 2023-03-02 VITALS
SYSTOLIC BLOOD PRESSURE: 136 MMHG | DIASTOLIC BLOOD PRESSURE: 86 MMHG | HEIGHT: 66 IN | BODY MASS INDEX: 38.57 KG/M2 | WEIGHT: 240 LBS

## 2023-03-02 LAB
GLUCOSE SERPL-MCNC: 75 MG/DL (ref 60–140)
HDLC SERPL-MCNC: 40 MG/DL
POC CHOLESTEROL, LDL (DOCK): 74 MG/DL
POC CHOLESTEROL, TOTAL: 131 MG/DL
TRIGL SERPL-MCNC: 88 MG/DL

## 2023-03-07 ENCOUNTER — OFFICE VISIT (OUTPATIENT)
Dept: ORTHOPEDICS | Facility: CLINIC | Age: 38
End: 2023-03-07
Payer: COMMERCIAL

## 2023-03-07 DIAGNOSIS — G56.01 CARPAL TUNNEL SYNDROME OF RIGHT WRIST: Primary | ICD-10-CM

## 2023-03-07 DIAGNOSIS — G56.02 CARPAL TUNNEL SYNDROME OF LEFT WRIST: ICD-10-CM

## 2023-03-07 DIAGNOSIS — R42 DIZZINESS OF UNKNOWN CAUSE: ICD-10-CM

## 2023-03-07 PROCEDURE — 99999 PR PBB SHADOW E&M-EST. PATIENT-LVL II: ICD-10-PCS | Mod: PBBFAC,,, | Performed by: ORTHOPAEDIC SURGERY

## 2023-03-07 PROCEDURE — 99213 PR OFFICE/OUTPT VISIT, EST, LEVL III, 20-29 MIN: ICD-10-PCS | Mod: 25,S$GLB,,

## 2023-03-07 PROCEDURE — 99213 OFFICE O/P EST LOW 20 MIN: CPT | Mod: 25,S$GLB,,

## 2023-03-07 PROCEDURE — 20526 PR INJECT CARPAL TUNNEL: ICD-10-PCS | Mod: 50,S$GLB,,

## 2023-03-07 PROCEDURE — 99999 PR PBB SHADOW E&M-EST. PATIENT-LVL II: CPT | Mod: PBBFAC,,, | Performed by: ORTHOPAEDIC SURGERY

## 2023-03-07 PROCEDURE — 20526 THER INJECTION CARP TUNNEL: CPT | Mod: 50,S$GLB,,

## 2023-03-07 RX ORDER — DEXAMETHASONE SODIUM PHOSPHATE 4 MG/ML
4 INJECTION, SOLUTION INTRA-ARTICULAR; INTRALESIONAL; INTRAMUSCULAR; INTRAVENOUS; SOFT TISSUE
Status: DISCONTINUED | OUTPATIENT
Start: 2023-03-07 | End: 2023-03-07 | Stop reason: HOSPADM

## 2023-03-07 RX ADMIN — DEXAMETHASONE SODIUM PHOSPHATE 4 MG: 4 INJECTION, SOLUTION INTRA-ARTICULAR; INTRALESIONAL; INTRAMUSCULAR; INTRAVENOUS; SOFT TISSUE at 08:03

## 2023-03-07 NOTE — PROCEDURES
Carpal Tunnel    Date/Time: 3/7/2023 8:00 AM  Performed by: Yrn Richards MD  Authorized by: Yrn Richards MD     Consent Done?:  Yes (Verbal)  Site marked: the procedure site was marked    Timeout: prior to procedure the correct patient, procedure, and site was verified    Prep: patient was prepped and draped in usual sterile fashion      Location:  Wrist (left carpal tunnel)  Ultrasonic Guidance for Needle Placement?: No    Needle size:  25 G  Approach:  Volar  Medications:  4 mg dexAMETHasone 4 mg/mL  Patient tolerance:  Patient tolerated the procedure well with no immediate complications

## 2023-03-07 NOTE — TELEPHONE ENCOUNTER
No new care gaps identified.  [facility] Embedded Care Gaps. Reference number: [id number]. 3/07/2023   5:22:42 PM CST

## 2023-03-07 NOTE — PROGRESS NOTES
[facility]  History & Physical  Orthopedics    SUBJECTIVE:      COVID-19 attestation:  This patient was treated during the COVID-19 pandemic.  This was discussed with the patient, they are aware of our current policies and procedures, were given the option of delaying their visit and or switching to a virtual visit, delaying their surgery when applicable, and they elect to proceed.    Chief Complaint:  Bilateral hand pain numbness and tingling    Referring Provider: No ref. provider found     Interval History 3/7/23:  Patient returns to clinic for f/u on EMG findings which were significant for bilateral moderate severity median nerve mononeuropathy. She states that using the brace at night has provided little relief as has using NSAIDs. She also complains of bilateral thumb locking and thumb base pain that has been present for months. She is interested in receiving an injection at this time and learning more about surgery.     History of Present Illness:  Patient is a 37 y.o. right hand dominant female who presents today with complaints of bilateral hand pain numbness and tingling.  The patient notes that this has been happening for the past month or so.  She notes that this is a recurrence from 2 years ago when she initially had symptoms which then improved with work changes.  She notes that it is really the entirety of her bilateral hands which is involved in the right greater than left hands.  She denies any other issues today presents for initial evaluation.     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    Onset of symptoms/DOI was 1 month ago.    Symptoms are aggravated by activity, movement, and at night.    Symptoms are alleviated by rest.    Symptoms consist of pain and numbness/tingling.    The patient rates their pain as a 8/10.    Attempted treatment(s) and/or interventions include activity modifications, rest  The patient denies any fevers, chills, N/V, D/C  and presents for evaluation.       Past Medical History:   Diagnosis Date    Acne     Anxiety disorder, unspecified 04/30/2020    Gastro-esophageal reflux disease without esophagitis 04/30/2020    Hypertension     Obesity      Past Surgical History:   Procedure Laterality Date    ESOPHAGOGASTRODUODENOSCOPY N/A 5/11/2020    Procedure: EGD (ESOPHAGOGASTRODUODENOSCOPY);  Surgeon: Ramiro Trejo Jr., MD;  Location: [facility];  Service: Endoscopy;  Laterality: N/A;     Review of patient's allergies indicates:  No Known Allergies  Social History     Social History Narrative    ** Merged History Encounter **         . Adolescent children.      Family History   Problem Relation Age of Onset    Diabetes Father     Hypertension Father     Hypertension Mother     Kidney disease Mother     Heart disease Mother     Crohn's disease Sister     Aneurysm Maternal Grandmother     Heart disease Maternal Grandfather     Heart disease Paternal Grandmother     Breast cancer Neg Hx     Colon cancer Neg Hx     Ovarian cancer Neg Hx     Melanoma Neg Hx          Current Outpatient Medications:     GINGER ROOT-PYRIDOXINE HCL,B6, ORAL, Take by mouth., Disp: , Rfl:     meclizine (ANTIVERT) 12.5 mg tablet, Take 1 tablet (12.5 mg total) by mouth 3 (three) times daily as needed for Dizziness., Disp: 30 tablet, Rfl: 0    valsartan (DIOVAN) 320 MG tablet, Take 1 tablet (320 mg total) by mouth once daily., Disp: 30 tablet, Rfl: 1      Review of Systems:  As per HPI otherwise noncontributory    OBJECTIVE:      Vital Signs (Most Recent):  There were no vitals filed for this visit.    There is no height or weight on file to calculate BMI.      Physical Exam:  Constitutional: The patient appears well-developed and well-nourished. No distress.   Skin: No lesions appreciated  Head: Normocephalic and atraumatic.   Nose: Nose normal.   Ears: No deformities seen  Eyes: Conjunctivae and EOM are normal.   Neck: No tracheal deviation present.    Cardiovascular: Normal rate and intact distal pulses.    Pulmonary/Chest: Effort normal. No respiratory distress.   Abdominal: There is no guarding.   Neurological: The patient is alert.   Psychiatric: The patient has a normal mood and affect.     Bilateral Hand/Wrist Examination:    Observation/Inspection:  Swelling  none    Deformity  none  Discoloration  none     Scars   none    Atrophy  none    HAND/WRIST EXAMINATION:  Finkelstein's Test   Neg  WHAT Test    Neg  Snuff box tenderness   Neg  Mckeon's Test    Neg  Hook of Hamate Tenderness  Neg  CMC grind    Neg  Circumduction test   Neg  Pain to palpation of thumbs bilaterally over A1 pulley    Neurovascular Exam:  Digits WWP, brisk CR < 3s throughout  NVI motor/LTS to M/R/U nerves, radial pulse 2+  Tinel's Test - Carpal Tunnel  positive  Tinel's Test - Cubital Tunnel  Neg  Phalen's Test    Neg  Median Nerve Compression Test positive    ROM hand full, painless    ROM wrist full, painless    ROM elbow full, painless    Abdomen not guarded  Respirations nonlabored  Perfusion intact    Diagnostic Results:     Imaging - I independently viewed the patient's imaging as well as the radiology report.  Xrays of the patient's bilateral hands  demonstrate no evidence of any acute fractures or dislocations or significant degenerative changes.    EMG - bilateral moderate severity median nerve mononeuropathy     ASSESSMENT/PLAN:      37 y.o. yo female with bilateral carpal tunnel syndrome and mild grade 1 bilateral trigger thumbs.   Plan: The patient and I had a thorough discussion today.  We discussed the working diagnosis as well as several other potential alternative diagnoses.  Treatment options were discussed, both conservative and surgical.  Conservative treatment options would include things such as activity modifications, workplace modifications, a period of rest, oral vs topical OTC and prescription anti-inflammatory medications, occupational therapy,  splinting/bracing, immobilization, corticosteroid injections, and others.  Surgical options were discussed as well.     At this time, the patient would like to proceed with bilateral carpal tunnel injections. Her trigger thumbs are very mild bilaterally, and she opts to take a conservative approach. The benefits of future carpal tunnel release are explained at length as well as the risks of surgery versus non-operative management.     RTC as needed.     Should the patient's symptoms worsen, persist, or fail to improve they should return for reevaluation and I would be happy to see them back anytime.        Yrn Richards M.D.    Please be aware that this note has been generated with the assistance of [employer] voice-to-text.  Please excuse any spelling or grammatical errors.    Thank you for choosing Dr. Yrn Richards for your orthopedic hand and upper extremity care. It is our goal to provide you with exceptional care that will help keep you healthy, active, and get you back in the game.     If you felt that you received exemplary care today, please consider leaving feedback for Dr. Richards on [email or web address] at [email or web address].    Please do not hesitate to reach out to us via email, phone, or MyChart with any questions, concerns, or feedback.

## 2023-03-07 NOTE — PROCEDURES
Carpal Tunnel    Date/Time: 3/7/2023 8:00 AM  Performed by: Yrn Richards MD  Authorized by: Yrn Richards MD     Consent Done?:  Yes (Verbal)  Indications:  Pain  Site marked: the procedure site was marked    Timeout: prior to procedure the correct patient, procedure, and site was verified    Prep: patient was prepped and draped in usual sterile fashion      Local anesthesia used?: Yes    Local anesthetic:  Topical anesthetic  Location:  Wrist (right carpal tunnel)  Ultrasonic Guidance for Needle Placement?: No    Needle size:  25 G  Approach:  Volar  Medications:  4 mg dexAMETHasone 4 mg/mL  Patient tolerance:  Patient tolerated the procedure well with no immediate complications

## 2023-03-08 RX ORDER — MECLIZINE HCL 12.5 MG 12.5 MG/1
12.5 TABLET ORAL 3 TIMES DAILY PRN
Qty: 30 TABLET | Refills: 0 | Status: SHIPPED | OUTPATIENT
Start: 2023-03-08 | End: 2023-05-18 | Stop reason: SDUPTHER

## 2023-04-06 ENCOUNTER — OFFICE VISIT (OUTPATIENT)
Dept: INTERNAL MEDICINE | Facility: CLINIC | Age: 38
End: 2023-04-06
Payer: COMMERCIAL

## 2023-04-06 VITALS
WEIGHT: 249.81 LBS | HEIGHT: 66 IN | DIASTOLIC BLOOD PRESSURE: 86 MMHG | HEART RATE: 98 BPM | OXYGEN SATURATION: 95 % | BODY MASS INDEX: 40.15 KG/M2 | SYSTOLIC BLOOD PRESSURE: 136 MMHG

## 2023-04-06 DIAGNOSIS — I10 PRIMARY HYPERTENSION: Primary | ICD-10-CM

## 2023-04-06 PROCEDURE — 1159F MED LIST DOCD IN RCRD: CPT | Mod: CPTII,S$GLB,, | Performed by: INTERNAL MEDICINE

## 2023-04-06 PROCEDURE — 99999 PR PBB SHADOW E&M-EST. PATIENT-LVL III: ICD-10-PCS | Mod: PBBFAC,,, | Performed by: INTERNAL MEDICINE

## 2023-04-06 PROCEDURE — 4010F PR ACE/ARB THEARPY RXD/TAKEN: ICD-10-PCS | Mod: CPTII,S$GLB,, | Performed by: INTERNAL MEDICINE

## 2023-04-06 PROCEDURE — 3079F DIAST BP 80-89 MM HG: CPT | Mod: CPTII,S$GLB,, | Performed by: INTERNAL MEDICINE

## 2023-04-06 PROCEDURE — 3075F PR MOST RECENT SYSTOLIC BLOOD PRESS GE 130-139MM HG: ICD-10-PCS | Mod: CPTII,S$GLB,, | Performed by: INTERNAL MEDICINE

## 2023-04-06 PROCEDURE — 3079F PR MOST RECENT DIASTOLIC BLOOD PRESSURE 80-89 MM HG: ICD-10-PCS | Mod: CPTII,S$GLB,, | Performed by: INTERNAL MEDICINE

## 2023-04-06 PROCEDURE — 3075F SYST BP GE 130 - 139MM HG: CPT | Mod: CPTII,S$GLB,, | Performed by: INTERNAL MEDICINE

## 2023-04-06 PROCEDURE — 99999 PR PBB SHADOW E&M-EST. PATIENT-LVL III: CPT | Mod: PBBFAC,,, | Performed by: INTERNAL MEDICINE

## 2023-04-06 PROCEDURE — 4010F ACE/ARB THERAPY RXD/TAKEN: CPT | Mod: CPTII,S$GLB,, | Performed by: INTERNAL MEDICINE

## 2023-04-06 PROCEDURE — 3008F PR BODY MASS INDEX (BMI) DOCUMENTED: ICD-10-PCS | Mod: CPTII,S$GLB,, | Performed by: INTERNAL MEDICINE

## 2023-04-06 PROCEDURE — 99213 PR OFFICE/OUTPT VISIT, EST, LEVL III, 20-29 MIN: ICD-10-PCS | Mod: S$GLB,,, | Performed by: INTERNAL MEDICINE

## 2023-04-06 PROCEDURE — 1159F PR MEDICATION LIST DOCUMENTED IN MEDICAL RECORD: ICD-10-PCS | Mod: CPTII,S$GLB,, | Performed by: INTERNAL MEDICINE

## 2023-04-06 PROCEDURE — 3008F BODY MASS INDEX DOCD: CPT | Mod: CPTII,S$GLB,, | Performed by: INTERNAL MEDICINE

## 2023-04-06 PROCEDURE — 99213 OFFICE O/P EST LOW 20 MIN: CPT | Mod: S$GLB,,, | Performed by: INTERNAL MEDICINE

## 2023-04-06 RX ORDER — VALSARTAN AND HYDROCHLOROTHIAZIDE 80; 12.5 MG/1; MG/1
1 TABLET, FILM COATED ORAL DAILY
Qty: 90 TABLET | Refills: 3 | Status: SHIPPED | OUTPATIENT
Start: 2023-04-06 | End: 2023-08-10 | Stop reason: SDUPTHER

## 2023-04-10 ENCOUNTER — TELEPHONE (OUTPATIENT)
Dept: BARIATRICS | Facility: CLINIC | Age: 38
End: 2023-04-10
Payer: COMMERCIAL

## 2023-04-10 NOTE — PROGRESS NOTES
"This note was generated with [employer] voice recognition software. I apologize for any possible typographical errors.  Jenni seems to have trouble with pronouns so I apologize if I Mis pronoun anyone     She is a 37-year-old female coming in today with concern concerns about her valsartan hydrochlorothiazide.  Her blood pressure is 136/86 and she says that when her valsartan was increased by the digital hypertension clinic he was having some dizziness.  She has history of some dizziness off unknown but she is worried about her dose about start but she has been breaking a pill in half and taking 160 mg a day.  She denies any can chest pain or shortness of breath.  He has past medical history significant for obesity with a BMI 40.32 and hypertension.  She also has a history of some anxiety disorder.  He has chronic dizziness intake any her on a p.r.n. basis.  He had to get off amlodipine 5 mg in the past because of some dizziness issues.    ROS : Gen - no fatigue or significant weight change  Eyes - no eye pain or visual changes  ENT - no hoarseness or sore throat  CV - No chest pain or SOB.  NO palpitations.  Pulm - no cough or wheezing  GI - no N/V/D   no dysuria or incontinence  MS - no joint pain or muscle pain  Skin - no rash, or c/o of skin lesions  Neuro - no HA, dizziness--- memory is doing well.   Heme - no abnormal bleeding or bruising  Endo - no polydipsia, or temperature changes  Psych - no anxiety or depression     /86 (BP Location: Left arm, Patient Position: Sitting, BP Method: Large (Manual))   Pulse 98   Ht 5' 6" (1.676 m)   Wt 113.3 kg (249 lb 12.5 oz)   SpO2 95%   BMI 40.32 kg/m²   General appearance: alert, appears stated age, cooperative, and morbidly obese  Head: Normocephalic, without obvious abnormality, atraumatic  Throat: lips, mucosa, and tongue normal; teeth and gums normal  Neck: no adenopathy, no carotid bruit, no JVD, supple, symmetrical, trachea midline, and thyroid not " enlarged, symmetric, no tenderness/mass/nodules  Back: symmetric, no curvature. ROM normal. No CVA tenderness.  Lungs: clear to auscultation bilaterally  Heart: regular rate and rhythm, S1, S2 normal, no murmur, click, rub or gallop  Abdomen: soft, non-tender; bowel sounds normal; no masses,  no organomegaly  Assessment and plan:  We are going to cut are Diovan down to 80 mg asthma hydrochlorothiazide.  We discussed diet and weight loss and I will see back again in 1 month.    Primary hypertension  -     Comprehensive Metabolic Panel; Future; Expected date: 04/06/2023  -     Hemoglobin A1C; Future; Expected date: 04/06/2023    Other orders  -     valsartan-hydrochlorothiazide (DIOVAN-HCT) 80-12.5 mg per tablet; Take 1 tablet by mouth once daily.  Dispense: 90 tablet; Refill: 3        Answers submitted by the patient for this visit:  High Blood Pressure Questionnaire (Submitted on 4/6/2023)  Chief Complaint: Hypertension  Chronicity: chronic  Onset: more than 1 month ago  Progression since onset: gradually improving  Condition status: uncontrolled  anxiety: Yes  blurred vision: Yes  chest pain: No  headaches: Yes  malaise/fatigue: No  neck pain: No  orthopnea: No  palpitations: No  peripheral edema: No  PND: No  shortness of breath: No  sweats: No  Agents associated with hypertension: anorectics  CAD risks: obesity, stress  Compliance problems: diet, medication side effects  Past treatments: nothing  Improvement on treatment: mild

## 2023-04-10 NOTE — TELEPHONE ENCOUNTER
Patient in patient work queue following financial appt.  Attempted to reach patient to discuss bariatric process and to see if interested in scheduling appt for bariatric surgery work up with JESSE and dietician, to discuss insurance requirements including BMI > 35 with at lease 1 comorbidity or BMI> 40 without comorbidities.  Also will need documentation of participation in a pre-operative weight loss program, which includes both nutritional counseling and an exercise regimen, lasting a minimum of 6 months without significant weight loss results; Must have participated in the program within the last 24 months.  Left VM with clinic call back number- Will need to verify pt meets insurance requirements, prior to scheduling JESSE and dietitian appt.

## 2023-04-11 ENCOUNTER — PATIENT MESSAGE (OUTPATIENT)
Dept: RESEARCH | Facility: HOSPITAL | Age: 38
End: 2023-04-11
Payer: COMMERCIAL

## 2023-04-14 ENCOUNTER — TELEPHONE (OUTPATIENT)
Dept: BARIATRICS | Facility: CLINIC | Age: 38
End: 2023-04-14
Payer: COMMERCIAL

## 2023-05-02 ENCOUNTER — TELEPHONE (OUTPATIENT)
Dept: BARIATRICS | Facility: CLINIC | Age: 38
End: 2023-05-02
Payer: COMMERCIAL

## 2023-05-02 ENCOUNTER — PATIENT MESSAGE (OUTPATIENT)
Dept: BARIATRICS | Facility: CLINIC | Age: 38
End: 2023-05-02
Payer: COMMERCIAL

## 2023-05-02 NOTE — TELEPHONE ENCOUNTER
Patient in patient work queue following financial appt.  #rd Attempt to reach patient to discuss bariatric process and to see if interested in scheduling appt for bariatric surgery work up with JESSE and dietician, to discuss insurance requirements including BMI > 35 with at lease 1 comorbidity or BMI> 40 without comorbidities.  Also will need documentation of participation in a pre-operative weight loss program, which includes both nutritional counseling and an exercise regimen, lasting a minimum of 6 months without significant weight loss results; Must have participated in the program within the last 24 months.  Left VM with clinic call back number for pt to call back if interested in further services-

## 2023-05-18 DIAGNOSIS — R42 DIZZINESS OF UNKNOWN CAUSE: ICD-10-CM

## 2023-05-19 RX ORDER — MECLIZINE HCL 12.5 MG 12.5 MG/1
12.5 TABLET ORAL 3 TIMES DAILY PRN
Qty: 30 TABLET | Refills: 0 | Status: SHIPPED | OUTPATIENT
Start: 2023-05-19 | End: 2023-06-19 | Stop reason: SDUPTHER

## 2023-05-19 NOTE — TELEPHONE ENCOUNTER
No care due was identified.  Health [facility] Embedded Care Due Messages. Reference number: [id number].   5/18/2023 11:31:35 PM CDT

## 2023-06-19 DIAGNOSIS — R42 DIZZINESS OF UNKNOWN CAUSE: ICD-10-CM

## 2023-06-19 RX ORDER — MECLIZINE HCL 12.5 MG 12.5 MG/1
12.5 TABLET ORAL 3 TIMES DAILY PRN
Qty: 30 TABLET | Refills: 0 | Status: SHIPPED | OUTPATIENT
Start: 2023-06-19 | End: 2023-09-25 | Stop reason: SDUPTHER

## 2023-06-19 NOTE — TELEPHONE ENCOUNTER
No care due was identified.  Health [facility] Embedded Care Due Messages. Reference number: [id number].   6/19/2023 9:06:39 AM CDT

## 2023-07-12 ENCOUNTER — TELEPHONE (OUTPATIENT)
Dept: UROLOGY | Facility: CLINIC | Age: 38
End: 2023-07-12
Payer: COMMERCIAL

## 2023-07-12 ENCOUNTER — PATIENT MESSAGE (OUTPATIENT)
Dept: UROLOGY | Facility: CLINIC | Age: 38
End: 2023-07-12
Payer: COMMERCIAL

## 2023-07-12 ENCOUNTER — TELEPHONE (OUTPATIENT)
Dept: SURGERY | Facility: CLINIC | Age: 38
End: 2023-07-12
Payer: COMMERCIAL

## 2023-07-12 ENCOUNTER — PATIENT MESSAGE (OUTPATIENT)
Dept: SURGERY | Facility: CLINIC | Age: 38
End: 2023-07-12
Payer: COMMERCIAL

## 2023-07-12 NOTE — TELEPHONE ENCOUNTER
Contacted pt to see about getting her scheduled with a NP//pt was unavailable vm was full and unable to leave a message//will contact on patient portal to get her scheduled

## 2023-07-12 NOTE — TELEPHONE ENCOUNTER
Unable to LVM to reschedule appt- Dr. Sorensen does not see hemorrhoids. Message sent to [facility] staff.

## 2023-07-18 ENCOUNTER — OFFICE VISIT (OUTPATIENT)
Dept: INTERNAL MEDICINE | Facility: CLINIC | Age: 38
End: 2023-07-18
Payer: COMMERCIAL

## 2023-07-18 VITALS
SYSTOLIC BLOOD PRESSURE: 117 MMHG | BODY MASS INDEX: 39.69 KG/M2 | OXYGEN SATURATION: 96 % | DIASTOLIC BLOOD PRESSURE: 72 MMHG | WEIGHT: 246.94 LBS | HEIGHT: 66 IN | HEART RATE: 96 BPM | TEMPERATURE: 98 F

## 2023-07-18 DIAGNOSIS — J01.40 ACUTE NON-RECURRENT PANSINUSITIS: Primary | ICD-10-CM

## 2023-07-18 DIAGNOSIS — E66.9 OBESITY (BMI 35.0-39.9 WITHOUT COMORBIDITY): ICD-10-CM

## 2023-07-18 PROCEDURE — 99999 PR PBB SHADOW E&M-EST. PATIENT-LVL IV: CPT | Mod: PBBFAC,,, | Performed by: NURSE PRACTITIONER

## 2023-07-18 PROCEDURE — 99214 PR OFFICE/OUTPT VISIT, EST, LEVL IV, 30-39 MIN: ICD-10-PCS | Mod: S$GLB,,, | Performed by: NURSE PRACTITIONER

## 2023-07-18 PROCEDURE — 1159F MED LIST DOCD IN RCRD: CPT | Mod: CPTII,S$GLB,, | Performed by: NURSE PRACTITIONER

## 2023-07-18 PROCEDURE — 4010F ACE/ARB THERAPY RXD/TAKEN: CPT | Mod: CPTII,S$GLB,, | Performed by: NURSE PRACTITIONER

## 2023-07-18 PROCEDURE — 3008F BODY MASS INDEX DOCD: CPT | Mod: CPTII,S$GLB,, | Performed by: NURSE PRACTITIONER

## 2023-07-18 PROCEDURE — 3008F PR BODY MASS INDEX (BMI) DOCUMENTED: ICD-10-PCS | Mod: CPTII,S$GLB,, | Performed by: NURSE PRACTITIONER

## 2023-07-18 PROCEDURE — 3074F PR MOST RECENT SYSTOLIC BLOOD PRESSURE < 130 MM HG: ICD-10-PCS | Mod: CPTII,S$GLB,, | Performed by: NURSE PRACTITIONER

## 2023-07-18 PROCEDURE — 3044F PR MOST RECENT HEMOGLOBIN A1C LEVEL <7.0%: ICD-10-PCS | Mod: CPTII,S$GLB,, | Performed by: NURSE PRACTITIONER

## 2023-07-18 PROCEDURE — 3044F HG A1C LEVEL LT 7.0%: CPT | Mod: CPTII,S$GLB,, | Performed by: NURSE PRACTITIONER

## 2023-07-18 PROCEDURE — 3074F SYST BP LT 130 MM HG: CPT | Mod: CPTII,S$GLB,, | Performed by: NURSE PRACTITIONER

## 2023-07-18 PROCEDURE — 3078F DIAST BP <80 MM HG: CPT | Mod: CPTII,S$GLB,, | Performed by: NURSE PRACTITIONER

## 2023-07-18 PROCEDURE — 1160F RVW MEDS BY RX/DR IN RCRD: CPT | Mod: CPTII,S$GLB,, | Performed by: NURSE PRACTITIONER

## 2023-07-18 PROCEDURE — 99214 OFFICE O/P EST MOD 30 MIN: CPT | Mod: S$GLB,,, | Performed by: NURSE PRACTITIONER

## 2023-07-18 PROCEDURE — 99999 PR PBB SHADOW E&M-EST. PATIENT-LVL IV: ICD-10-PCS | Mod: PBBFAC,,, | Performed by: NURSE PRACTITIONER

## 2023-07-18 PROCEDURE — 4010F PR ACE/ARB THEARPY RXD/TAKEN: ICD-10-PCS | Mod: CPTII,S$GLB,, | Performed by: NURSE PRACTITIONER

## 2023-07-18 PROCEDURE — 3078F PR MOST RECENT DIASTOLIC BLOOD PRESSURE < 80 MM HG: ICD-10-PCS | Mod: CPTII,S$GLB,, | Performed by: NURSE PRACTITIONER

## 2023-07-18 PROCEDURE — 1159F PR MEDICATION LIST DOCUMENTED IN MEDICAL RECORD: ICD-10-PCS | Mod: CPTII,S$GLB,, | Performed by: NURSE PRACTITIONER

## 2023-07-18 PROCEDURE — 1160F PR REVIEW ALL MEDS BY PRESCRIBER/CLIN PHARMACIST DOCUMENTED: ICD-10-PCS | Mod: CPTII,S$GLB,, | Performed by: NURSE PRACTITIONER

## 2023-07-18 RX ORDER — FLUTICASONE PROPIONATE 50 MCG
1 SPRAY, SUSPENSION (ML) NASAL 2 TIMES DAILY
Qty: 16 G | Refills: 0 | Status: SHIPPED | OUTPATIENT
Start: 2023-07-18 | End: 2023-11-17

## 2023-07-18 RX ORDER — METHYLPREDNISOLONE 4 MG/1
TABLET ORAL
Qty: 21 EACH | Refills: 0 | Status: SHIPPED | OUTPATIENT
Start: 2023-07-18 | End: 2023-08-08

## 2023-07-18 NOTE — PROGRESS NOTES
Subjective     Patient ID: Kaylin Vargas is a 38 y.o. female.    Chief Complaint: Facial Pain and Sinus Problem    Pt of Dr. Conley, here for sinus issue    Sinus Problem  This is a new problem. The current episode started in the past 7 days (5 days ago). The problem is unchanged. There has been no fever. Her pain is at a severity of 0/10. She is experiencing no pain. Associated symptoms include congestion, ear pain, headaches, sinus pressure, sneezing and a sore throat. Pertinent negatives include no chills, coughing, diaphoresis, hoarse voice, neck pain, shortness of breath or swollen glands. Past treatments include oral decongestants and acetaminophen (zyrtec). The treatment provided no relief.   Review of Systems   Constitutional:  Negative for activity change, appetite change, chills, diaphoresis, fatigue, fever and unexpected weight change.   HENT:  Positive for nasal congestion, ear pain, postnasal drip, rhinorrhea, sinus pressure/congestion, sneezing and sore throat. Negative for hoarse voice.    Respiratory:  Negative for cough, chest tightness and shortness of breath.    Cardiovascular:  Negative for chest pain.   Gastrointestinal:  Positive for diarrhea. Negative for abdominal pain, constipation, nausea and vomiting.   Endocrine: Negative for cold intolerance, heat intolerance, polydipsia, polyphagia and polyuria.   Genitourinary:  Negative for dysuria.   Musculoskeletal:  Negative for arthralgias, myalgias and neck pain.   Allergic/Immunologic: Positive for environmental allergies. Negative for food allergies and immunocompromised state.        Dog in home  Sleeps with ceiling fan   Neurological:  Positive for headaches. Negative for dizziness, weakness, light-headedness and numbness.   Hematological:  Negative for adenopathy. Does not bruise/bleed easily.   Psychiatric/Behavioral:  Negative for suicidal ideas.      Review of patient's allergies indicates:  No Known Allergies      Current  Outpatient Medications:     GINGER ROOT-PYRIDOXINE HCL,B6, ORAL, Take by mouth., Disp: , Rfl:     meclizine (ANTIVERT) 12.5 mg tablet, Take 1 tablet (12.5 mg total) by mouth 3 (three) times daily as needed for Dizziness., Disp: 30 tablet, Rfl: 0    valsartan-hydrochlorothiazide (DIOVAN-HCT) 80-12.5 mg per tablet, Take 1 tablet by mouth once daily., Disp: 90 tablet, Rfl: 3    Patient Active Problem List   Diagnosis    Obesity (BMI 35.0-39.9 without comorbidity)    BMI 38.0-38.9,adult    Suspected sleep apnea    Contact w and exposure to oth viral communicable diseases    Gastro-esophageal reflux disease without esophagitis    Anxiety disorder, unspecified    Allergic rhinitis, unspecified    Epigastric pain    Left foot pain    Contusion of left foot    Primary hypertension    Dizziness     Past Medical History:   Diagnosis Date    Acne     Anxiety disorder, unspecified 04/30/2020    Gastro-esophageal reflux disease without esophagitis 04/30/2020    Hypertension     Obesity      Past Surgical History:   Procedure Laterality Date    ESOPHAGOGASTRODUODENOSCOPY N/A 05/11/2020    Procedure: EGD (ESOPHAGOGASTRODUODENOSCOPY);  Surgeon: Ramiro Trejo Jr., MD;  Location: [facility];  Service: Endoscopy;  Laterality: N/A;     Social History     Socioeconomic History    Marital status:     Number of children: 2   Tobacco Use    Smoking status: Never    Smokeless tobacco: Never   Substance and Sexual Activity    Alcohol use: Not Currently     Alcohol/week: 2.0 standard drinks     Types: 2 Glasses of wine per week     Comment: social    Drug use: Never    Sexual activity: Yes     Partners: Male     Birth control/protection: None   Social History Narrative    ** Merged History Encounter **         . Adolescent children.      Social Determinants of Health     Financial Resource Strain: Low Risk     Difficulty of Paying Living Expenses: Not very hard   Food Insecurity: No Food Insecurity    Worried About Running Out  "of Food in the Last Year: Never true    Ran Out of Food in the Last Year: Never true   Transportation Needs: No Transportation Needs    Lack of Transportation (Medical): No    Lack of Transportation (Non-Medical): No   Physical Activity: Insufficiently Active    Days of Exercise per Week: 2 days    Minutes of Exercise per Session: 30 min   Stress: Stress Concern Present    Feeling of Stress : Very much   Social Connections: Unknown    Frequency of Communication with Friends and Family: Once a week    Frequency of Social Gatherings with Friends and Family: Once a week    Active Member of Clubs or Organizations: Yes    Attends Club or Organization Meetings: 1 to 4 times per year    Marital Status:    Housing Stability: High Risk    Unable to Pay for Housing in the Last Year: No    Number of Places Lived in the Last Year: 1    Unstable Housing in the Last Year: Yes     Family History   Problem Relation Age of Onset    Diabetes Father     Hypertension Father     Cancer Father     Stroke Father     Hypertension Mother     Kidney disease Mother     Heart disease Mother     Diabetes Mother     Early death Mother     Crohn's disease Sister     Aneurysm Maternal Grandmother     Heart disease Maternal Grandfather     Heart disease Paternal Grandmother     Breast cancer Neg Hx     Colon cancer Neg Hx     Ovarian cancer Neg Hx     Melanoma Neg Hx           Objective   Vitals:    07/18/23 1348   BP: 117/72   Pulse: 96   Temp: 98.3 °F (36.8 °C)   SpO2: 96%   Weight: 112 kg (246 lb 14.6 oz)   Height: 5' 6" (1.676 m)   PainSc: 0-No pain     Body mass index is 39.85 kg/m².    Physical Exam  Vitals and nursing note reviewed.   Constitutional:       Appearance: She is obese.   HENT:      Head: Normocephalic.      Right Ear: Tympanic membrane, ear canal and external ear normal. There is no impacted cerumen.      Left Ear: Tympanic membrane, ear canal and external ear normal. There is no impacted cerumen.      Nose: Mucosal " edema, congestion and rhinorrhea present.      Right Turbinates: Swollen.      Left Turbinates: Swollen.      Right Sinus: Maxillary sinus tenderness and frontal sinus tenderness present.      Left Sinus: Maxillary sinus tenderness and frontal sinus tenderness present.      Mouth/Throat:      Mouth: Mucous membranes are moist.      Pharynx: Oropharynx is clear. No oropharyngeal exudate or posterior oropharyngeal erythema.      Comments: Clear PND noted on exam      Eyes:      Conjunctiva/sclera: Conjunctivae normal.   Cardiovascular:      Rate and Rhythm: Normal rate and regular rhythm.      Pulses: Normal pulses.      Heart sounds: Normal heart sounds.   Pulmonary:      Effort: Pulmonary effort is normal.      Breath sounds: Normal breath sounds.   Musculoskeletal:         General: Normal range of motion.      Cervical back: Normal range of motion and neck supple.   Lymphadenopathy:      Cervical: No cervical adenopathy.   Skin:     General: Skin is warm and dry.   Neurological:      General: No focal deficit present.      Mental Status: She is alert and oriented to person, place, and time.   Psychiatric:         Mood and Affect: Mood normal.         Behavior: Behavior normal.         Thought Content: Thought content normal.         Judgment: Judgment normal.          Assessment and Plan     1. Acute non-recurrent pansinusitis  -     methylPREDNISolone (MEDROL DOSEPACK) 4 mg tablet; use as directed  Dispense: 21 each; Refill: 0  -     fluticasone propionate (FLONASE) 50 mcg/actuation nasal spray; 1 spray (50 mcg total) by Each Nostril route 2 (two) times daily.  Dispense: 16 g; Refill: 0    2. BMI 39.0-39.9,adult  BMI reviewed    3. Obesity (BMI 35.0-39.9 without comorbidity)  BMI reviewed.    Diet and exercise to lose weight.    Meds as prescribed    Rest and Fluids, Tylenol or Motrin otc as needed for pain and or fever    Warm liquids to thin mucus    Allergen avoidance discussed, humidified air  recommended    Warm salt water gargles as needed for throat pain    Nasal spray, taught how to correctly use    Self care instructions provided in AVS    Follow up if symptoms worsen or fail to improve.

## 2023-07-26 ENCOUNTER — TELEPHONE (OUTPATIENT)
Dept: SURGERY | Facility: CLINIC | Age: 38
End: 2023-07-26
Payer: COMMERCIAL

## 2023-07-26 NOTE — TELEPHONE ENCOUNTER
Contacted pt x3 to see about getting her scheduled here in  our Colon and Rectal Dept with N/P Lauren Lerma for August 3rd//this time I was able to leave a detailed message to get her scheduled

## 2023-08-10 ENCOUNTER — OFFICE VISIT (OUTPATIENT)
Dept: INTERNAL MEDICINE | Facility: CLINIC | Age: 38
End: 2023-08-10
Payer: COMMERCIAL

## 2023-08-10 VITALS
HEIGHT: 66 IN | SYSTOLIC BLOOD PRESSURE: 118 MMHG | OXYGEN SATURATION: 97 % | BODY MASS INDEX: 39.04 KG/M2 | WEIGHT: 242.94 LBS | HEART RATE: 90 BPM | DIASTOLIC BLOOD PRESSURE: 82 MMHG

## 2023-08-10 DIAGNOSIS — I10 PRIMARY HYPERTENSION: Primary | ICD-10-CM

## 2023-08-10 DIAGNOSIS — Z23 NEED FOR TDAP VACCINATION: ICD-10-CM

## 2023-08-10 DIAGNOSIS — E66.01 SEVERE OBESITY (BMI 35.0-39.9) WITH COMORBIDITY: ICD-10-CM

## 2023-08-10 DIAGNOSIS — E66.9 OBESITY (BMI 35.0-39.9 WITHOUT COMORBIDITY): ICD-10-CM

## 2023-08-10 PROCEDURE — 99214 PR OFFICE/OUTPT VISIT, EST, LEVL IV, 30-39 MIN: ICD-10-PCS | Mod: 25,S$GLB,, | Performed by: INTERNAL MEDICINE

## 2023-08-10 PROCEDURE — 4010F ACE/ARB THERAPY RXD/TAKEN: CPT | Mod: CPTII,S$GLB,, | Performed by: INTERNAL MEDICINE

## 2023-08-10 PROCEDURE — 3074F SYST BP LT 130 MM HG: CPT | Mod: CPTII,S$GLB,, | Performed by: INTERNAL MEDICINE

## 2023-08-10 PROCEDURE — 99999 PR PBB SHADOW E&M-EST. PATIENT-LVL IV: ICD-10-PCS | Mod: PBBFAC,,, | Performed by: INTERNAL MEDICINE

## 2023-08-10 PROCEDURE — 3008F BODY MASS INDEX DOCD: CPT | Mod: CPTII,S$GLB,, | Performed by: INTERNAL MEDICINE

## 2023-08-10 PROCEDURE — 90715 TDAP VACCINE GREATER THAN OR EQUAL TO 7YO IM: ICD-10-PCS | Mod: S$GLB,,, | Performed by: INTERNAL MEDICINE

## 2023-08-10 PROCEDURE — 90715 TDAP VACCINE 7 YRS/> IM: CPT | Mod: S$GLB,,, | Performed by: INTERNAL MEDICINE

## 2023-08-10 PROCEDURE — 4010F PR ACE/ARB THEARPY RXD/TAKEN: ICD-10-PCS | Mod: CPTII,S$GLB,, | Performed by: INTERNAL MEDICINE

## 2023-08-10 PROCEDURE — 1159F PR MEDICATION LIST DOCUMENTED IN MEDICAL RECORD: ICD-10-PCS | Mod: CPTII,S$GLB,, | Performed by: INTERNAL MEDICINE

## 2023-08-10 PROCEDURE — 3044F HG A1C LEVEL LT 7.0%: CPT | Mod: CPTII,S$GLB,, | Performed by: INTERNAL MEDICINE

## 2023-08-10 PROCEDURE — 3044F PR MOST RECENT HEMOGLOBIN A1C LEVEL <7.0%: ICD-10-PCS | Mod: CPTII,S$GLB,, | Performed by: INTERNAL MEDICINE

## 2023-08-10 PROCEDURE — 99214 OFFICE O/P EST MOD 30 MIN: CPT | Mod: 25,S$GLB,, | Performed by: INTERNAL MEDICINE

## 2023-08-10 PROCEDURE — 3074F PR MOST RECENT SYSTOLIC BLOOD PRESSURE < 130 MM HG: ICD-10-PCS | Mod: CPTII,S$GLB,, | Performed by: INTERNAL MEDICINE

## 2023-08-10 PROCEDURE — 3008F PR BODY MASS INDEX (BMI) DOCUMENTED: ICD-10-PCS | Mod: CPTII,S$GLB,, | Performed by: INTERNAL MEDICINE

## 2023-08-10 PROCEDURE — 99999 PR PBB SHADOW E&M-EST. PATIENT-LVL IV: CPT | Mod: PBBFAC,,, | Performed by: INTERNAL MEDICINE

## 2023-08-10 PROCEDURE — 90471 TDAP VACCINE GREATER THAN OR EQUAL TO 7YO IM: ICD-10-PCS | Mod: S$GLB,,, | Performed by: INTERNAL MEDICINE

## 2023-08-10 PROCEDURE — 3079F PR MOST RECENT DIASTOLIC BLOOD PRESSURE 80-89 MM HG: ICD-10-PCS | Mod: CPTII,S$GLB,, | Performed by: INTERNAL MEDICINE

## 2023-08-10 PROCEDURE — 1159F MED LIST DOCD IN RCRD: CPT | Mod: CPTII,S$GLB,, | Performed by: INTERNAL MEDICINE

## 2023-08-10 PROCEDURE — 90471 IMMUNIZATION ADMIN: CPT | Mod: S$GLB,,, | Performed by: INTERNAL MEDICINE

## 2023-08-10 PROCEDURE — 3079F DIAST BP 80-89 MM HG: CPT | Mod: CPTII,S$GLB,, | Performed by: INTERNAL MEDICINE

## 2023-08-10 RX ORDER — VALSARTAN AND HYDROCHLOROTHIAZIDE 80; 12.5 MG/1; MG/1
1 TABLET, FILM COATED ORAL DAILY
Qty: 90 TABLET | Refills: 3 | Status: SHIPPED | OUTPATIENT
Start: 2023-08-10 | End: 2024-08-09

## 2023-08-10 NOTE — PROGRESS NOTES
Two pt identifier verified. Pt tolerated well. Advised pt to wait 15 minutes post immunization. Pt verbalized understanding.    Kathy WEINSTEIN

## 2023-08-10 NOTE — PROGRESS NOTES
INTERNAL MEDICINE RESIDENT CLINIC  CLINIC NOTE    Patient Name: Kaylin Vargas  YOB: 1985    PRESENTING HISTORY       History of Present Illness:  Ms. Kaylin Vargas is a 38 y.o. female with a medical history of HTN currently on  valsartan- hydrochlorothiazide, obesity and anxiety who came in for follow up on her blood pressure. Previously the pt was experiencing dizziness with her BP medication. Today, her BP was 118/82, pt denies having headaches, dizziness, chest pain or palpitation. She also requested counseling regarding weight loss and possible referral to bariatric surgery clinic.     Review of Systems   Constitutional: Negative.    HENT:  Negative for hearing loss.    Eyes:  Negative for discharge.   Respiratory:  Negative for wheezing.    Cardiovascular:  Negative for chest pain and palpitations.   Gastrointestinal:  Negative for blood in stool, constipation, diarrhea and vomiting.   Genitourinary:  Negative for dysuria and hematuria.   Musculoskeletal:  Negative for neck pain.   Skin: Negative.    Neurological:  Negative for dizziness, weakness and headaches.   Endo/Heme/Allergies:  Negative for polydipsia.   Psychiatric/Behavioral: Negative.           PAST HISTORY:     Past Medical History:   Diagnosis Date    Acne     Anxiety disorder, unspecified 04/30/2020    Gastro-esophageal reflux disease without esophagitis 04/30/2020    Hypertension     Obesity        Past Surgical History:   Procedure Laterality Date    ESOPHAGOGASTRODUODENOSCOPY N/A 05/11/2020    Procedure: EGD (ESOPHAGOGASTRODUODENOSCOPY);  Surgeon: Ramiro Trejo Jr., MD;  Location: [facility];  Service: Endoscopy;  Laterality: N/A;       Family History   Problem Relation Age of Onset    Diabetes Father     Hypertension Father     Cancer Father     Stroke Father     Hypertension Mother     Kidney disease Mother     Heart disease Mother     Diabetes Mother     Early death Mother     Crohn's disease Sister      Aneurysm Maternal Grandmother     Heart disease Maternal Grandfather     Heart disease Paternal Grandmother     Breast cancer Neg Hx     Colon cancer Neg Hx     Ovarian cancer Neg Hx     Melanoma Neg Hx        Social History     Socioeconomic History    Marital status:     Number of children: 2   Tobacco Use    Smoking status: Never    Smokeless tobacco: Never   Substance and Sexual Activity    Alcohol use: Not Currently     Alcohol/week: 2.0 standard drinks of alcohol     Types: 2 Glasses of wine per week     Comment: social    Drug use: Never    Sexual activity: Yes     Partners: Male     Birth control/protection: None   Social History Narrative    ** Merged History Encounter **         . Adolescent children.      Social Determinants of Health     Financial Resource Strain: Low Risk  (8/10/2023)    Overall Financial Resource Strain (CARDIA)     Difficulty of Paying Living Expenses: Not very hard   Food Insecurity: No Food Insecurity (8/10/2023)    Hunger Vital Sign     Worried About Running Out of Food in the Last Year: Never true     Ran Out of Food in the Last Year: Never true   Transportation Needs: No Transportation Needs (8/10/2023)    PRAPARE - Transportation     Lack of Transportation (Medical): No     Lack of Transportation (Non-Medical): No   Physical Activity: Insufficiently Active (8/10/2023)    Exercise Vital Sign     Days of Exercise per Week: 1 day     Minutes of Exercise per Session: 30 min   Stress: Stress Concern Present (8/10/2023)    [employer] - Occupational Stress Questionnaire     Feeling of Stress : Rather much   Social Connections: Unknown (8/10/2023)    Social Connection and Isolation Panel [NHANES]     Frequency of Communication with Friends and Family: Once a week     Frequency of Social Gatherings with Friends and Family: Once a week     Active Member of Clubs or Organizations: Yes     Attends Club or Organization Meetings: More than 4 times  "per year     Marital Status:    Housing Stability: Low Risk  (8/10/2023)    Housing Stability Vital Sign     Unable to Pay for Housing in the Last Year: No     Number of Places Lived in the Last Year: 1     Unstable Housing in the Last Year: No   Recent Concern: Housing Stability - High Risk (7/18/2023)    Housing Stability Vital Sign     Unable to Pay for Housing in the Last Year: No     Number of Places Lived in the Last Year: 1     Unstable Housing in the Last Year: Yes       MEDICATIONS & ALLERGIES:     Current Outpatient Medications on File Prior to Visit   Medication Sig    fluticasone propionate (FLONASE) 50 mcg/actuation nasal spray 1 spray (50 mcg total) by Each Nostril route 2 (two) times daily.    GINGER ROOT-PYRIDOXINE HCL,B6, ORAL Take by mouth.    meclizine (ANTIVERT) 12.5 mg tablet Take 1 tablet (12.5 mg total) by mouth 3 (three) times daily as needed for Dizziness.    [DISCONTINUED] valsartan-hydrochlorothiazide (DIOVAN-HCT) 80-12.5 mg per tablet Take 1 tablet by mouth once daily.     No current facility-administered medications on file prior to visit.       Review of patient's allergies indicates:  No Known Allergies    OBJECTIVE:   Vital Signs:  Vitals:    08/10/23 0833   BP: 118/82   Pulse: 90   SpO2: 97%   Weight: 110.2 kg (242 lb 15.2 oz)   Height: 5' 6" (1.676 m)       No results found for this or any previous visit (from the past 24 hour(s)).      Physical Exam  Constitutional:       Appearance: She is obese.   HENT:      Head: Normocephalic and atraumatic.      Nose: Nose normal.      Mouth/Throat:      Mouth: Mucous membranes are moist.   Eyes:      Pupils: Pupils are equal, round, and reactive to light.   Cardiovascular:      Rate and Rhythm: Tachycardia present.      Pulses: Normal pulses.      Heart sounds: Normal heart sounds.   Pulmonary:      Effort: Pulmonary effort is normal.      Breath sounds: Normal breath sounds.   Abdominal:      General: Bowel sounds are normal. "   Musculoskeletal:         General: Normal range of motion.      Right lower leg: No edema.      Left lower leg: No edema.   Skin:     General: Skin is warm.   Neurological:      General: No focal deficit present.      Mental Status: She is alert.   Psychiatric:         Mood and Affect: Mood normal.         Behavior: Behavior normal.         Thought Content: Thought content normal.         Judgment: Judgment normal.           ASSESSMENT & PLAN:     1. Primary hypertension  Overview:  Pt with history of HTN. Denies headache, chest pain, dizziness or palpitation. Currently on Valsartan- Hydrochlorothiazide 80-12.5 mg.    -Blood pressure stable 118/82  -Continue Valsartan- Hydrochlorothiazide  -Medication refill      2. Obesity (BMI 35.0-39.9 without comorbidity)  Overview:    Pt with BMI of 39.21 who requested referral for bariatric medicine.    - encouraged to continue walking 30-40 mins/day at least 3-4 days /week  - emphasis placed on changing diet (for lowering cholesterol & for weight loss). Encouraged use of phone apps (MyFitInterview Pal vs. Lose It) to track foods.  - Referral to bariatric medicine    Orders:  -     Ambulatory referral/consult to Bariatric Medicine; Future; Expected date: 08/17/2023    3. Severe obesity (BMI 35.0-39.9) with comorbidity    4. Need for Tdap vaccination      (In Office Administered) Tdap Vaccine      Discussed with Dr. Conley    RTC in 12 months.      Pepe Man MD/MPH  Internal Medicine PGY-1  [facility]

## 2023-08-15 NOTE — PROGRESS NOTES
"I have personally taken the history and examined this patient and agree with the resident's note as stated above with the following thoughts:  /82 (BP Location: Left arm, Patient Position: Sitting, BP Method: Large (Manual))   Pulse 90   Ht 5' 6" (1.676 m)   Wt 110.2 kg (242 lb 15.2 oz)   SpO2 97%   BMI 39.21 kg/m²     Discussed getting vaccines up to date.  Discussed importance of low fat diet and exercise .  Blood pressure is doing well continue current medications for that.  We discussed a goal of trying to exercise and increase her heart rate at least 150 minutes a week and also try to get 10,000 steps a day  Answers submitted by the patient for this visit:  Review of Systems Questionnaire (Submitted on 8/10/2023)  activity change: No  unexpected weight change: No  neck pain: No  hearing loss: No  rhinorrhea: No  trouble swallowing: No  eye discharge: No  visual disturbance: No  chest tightness: No  wheezing: No  chest pain: No  palpitations: No  blood in stool: No  constipation: No  vomiting: No  diarrhea: No  polydipsia: No  polyuria: No  difficulty urinating: No  hematuria: No  menstrual problem: No  dysuria: No  joint swelling: No  arthralgias: No  headaches: No  weakness: No  confusion: No  dysphoric mood: No    "

## 2023-09-20 ENCOUNTER — PATIENT MESSAGE (OUTPATIENT)
Dept: ADMINISTRATIVE | Facility: OTHER | Age: 38
End: 2023-09-20
Payer: COMMERCIAL

## 2023-09-25 DIAGNOSIS — R42 DIZZINESS OF UNKNOWN CAUSE: ICD-10-CM

## 2023-09-25 RX ORDER — MECLIZINE HCL 12.5 MG 12.5 MG/1
12.5 TABLET ORAL 3 TIMES DAILY PRN
Qty: 30 TABLET | Refills: 0 | Status: SHIPPED | OUTPATIENT
Start: 2023-09-25

## 2023-09-25 NOTE — TELEPHONE ENCOUNTER
No care due was identified.  [facility] Embedded Care Due Messages. Reference number: [id number].   9/25/2023 9:17:46 AM CDT

## 2023-09-26 ENCOUNTER — TELEPHONE (OUTPATIENT)
Dept: SURGERY | Facility: CLINIC | Age: 38
End: 2023-09-26
Payer: COMMERCIAL

## 2023-10-03 ENCOUNTER — TELEPHONE (OUTPATIENT)
Dept: BARIATRICS | Facility: CLINIC | Age: 38
End: 2023-10-03
Payer: COMMERCIAL

## 2023-10-10 ENCOUNTER — OFFICE VISIT (OUTPATIENT)
Dept: GASTROENTEROLOGY | Facility: CLINIC | Age: 38
End: 2023-10-10
Payer: COMMERCIAL

## 2023-10-10 DIAGNOSIS — R10.13 DYSPEPSIA: Primary | ICD-10-CM

## 2023-10-10 DIAGNOSIS — R11.0 NAUSEA: ICD-10-CM

## 2023-10-10 PROCEDURE — 3044F PR MOST RECENT HEMOGLOBIN A1C LEVEL <7.0%: ICD-10-PCS | Mod: CPTII,95,, | Performed by: NURSE PRACTITIONER

## 2023-10-10 PROCEDURE — 99204 PR OFFICE/OUTPT VISIT, NEW, LEVL IV, 45-59 MIN: ICD-10-PCS | Mod: 95,,, | Performed by: NURSE PRACTITIONER

## 2023-10-10 PROCEDURE — 1159F MED LIST DOCD IN RCRD: CPT | Mod: CPTII,95,, | Performed by: NURSE PRACTITIONER

## 2023-10-10 PROCEDURE — 3044F HG A1C LEVEL LT 7.0%: CPT | Mod: CPTII,95,, | Performed by: NURSE PRACTITIONER

## 2023-10-10 PROCEDURE — 1160F RVW MEDS BY RX/DR IN RCRD: CPT | Mod: CPTII,95,, | Performed by: NURSE PRACTITIONER

## 2023-10-10 PROCEDURE — 4010F PR ACE/ARB THEARPY RXD/TAKEN: ICD-10-PCS | Mod: CPTII,95,, | Performed by: NURSE PRACTITIONER

## 2023-10-10 PROCEDURE — 1160F PR REVIEW ALL MEDS BY PRESCRIBER/CLIN PHARMACIST DOCUMENTED: ICD-10-PCS | Mod: CPTII,95,, | Performed by: NURSE PRACTITIONER

## 2023-10-10 PROCEDURE — 4010F ACE/ARB THERAPY RXD/TAKEN: CPT | Mod: CPTII,95,, | Performed by: NURSE PRACTITIONER

## 2023-10-10 PROCEDURE — 99204 OFFICE O/P NEW MOD 45 MIN: CPT | Mod: 95,,, | Performed by: NURSE PRACTITIONER

## 2023-10-10 PROCEDURE — 1159F PR MEDICATION LIST DOCUMENTED IN MEDICAL RECORD: ICD-10-PCS | Mod: CPTII,95,, | Performed by: NURSE PRACTITIONER

## 2023-10-10 RX ORDER — SUCRALFATE 1 G/1
1 TABLET ORAL 3 TIMES DAILY
Qty: 90 TABLET | Refills: 2 | Status: SHIPPED | OUTPATIENT
Start: 2023-10-10 | End: 2023-12-07

## 2023-10-10 RX ORDER — ONDANSETRON 8 MG/1
8 TABLET, ORALLY DISINTEGRATING ORAL EVERY 6 HOURS PRN
Qty: 30 TABLET | Refills: 1 | Status: SHIPPED | OUTPATIENT
Start: 2023-10-10 | End: 2023-11-21

## 2023-10-10 RX ORDER — OMEPRAZOLE 40 MG/1
40 CAPSULE, DELAYED RELEASE ORAL DAILY
Qty: 30 CAPSULE | Refills: 11 | Status: SHIPPED | OUTPATIENT
Start: 2023-10-10 | End: 2024-10-09

## 2023-10-10 NOTE — PROGRESS NOTES
"     GASTROENTEROLOGY CLINIC NOTE    Chief Complaint: The primary encounter diagnosis was Dyspepsia. A diagnosis of Nausea was also pertinent to this visit.  Referring provider/PCP: Jona Conley Jr., MD    The patient location is: [place]  The chief complaint leading to consultation is: dyspepsia    Visit type: audiovisual    Face to Face time with patient: 12:11  25 minutes of total time spent on the encounter, which includes face to face time and non-face to face time preparing to see the patient (eg, review of tests), Obtaining and/or reviewing separately obtained history, Documenting clinical information in the electronic or other health record, Independently interpreting results (not separately reported) and communicating results to the patient/family/caregiver, or Care coordination (not separately reported).     Each patient to whom he or she provides medical services by telemedicine is:  (1) informed of the relationship between the physician and patient and the respective role of any other health care provider with respect to management of the patient; and (2) notified that he or she may decline to receive medical services by telemedicine and may withdraw from such care at any time.    Notes:    Kaylin Vargas is a 38 y.o. female who is a new patient to me with a PMH that's significant for esophagitis.  She presents via telemedicine encounter today to establish care for globus sensation, nausea, and "sore" stomach. These symptoms began a few weeks ago and are starting to improve. Globus sensation is worse after eating. No dysphagia.  Similar symptoms three years ago. EGD performed which was significant for esophagitis. Placed on course of omeprazole and sucralfate which improved symptoms until recently.     Reflux: No  Dysphagia/Odynophagia: No  Nausea/Vomiting: No  Appetite Changes: No  Abdominal Pain: Soreness lower abdomen worse in morning.   Bowel Habits: regular bowel movements. Has at least " two bowel movements twice a day. No change in bowel habits.  GI Bleeding: Denies hematochezia, hematemesis, melena, BRBPR, Black/tarry stool, coffee ground emesis  Unexplained Weight Loss: No     GLP-1s: No  NSAIDs: No  Anticoagulation or Antiplatelet: No    History of H.pylori: no  H.pylori Treatment:  Prior Upper Endoscopy: 5/2020  Impression:          - Normal oropharynx.                        - Normal upper third of esophagus and middle third                        of esophagus.                        - Reflux esophagitis. Biopsied.                        - (The history and examination was suspicious for an                        esophageal spasm).                        - Z-line regular, 35 cm from the incisors.                        - Patulous lower esophageal sphincter.                        - (Non-erosive esophageal reflux (NERD) disease).                        - Normal gastric fundus and gastric body.                        - Minimal antritis. Biopsied.                        - Normal pylorus.                        - Normal examined duodenum.     Recommendation:      - Discharge patient to home.                        - Await pathology and CLOtest results.                        - Follow an antireflux regimen.                        - Continue present medications.     Pathology:  1. DISTAL ESOPHAGUS, BIOPSY:   Squamous mucosa with mild chronic inflammation and reactive changes   No evidence of intestinal metaplasia, dysplasia or malignancy   No evidence of significant eosinophils present     2. STOMACH, BIOPSY:   Gastric body and antral mucosa with active chronic gastritis and reactive   changes   No evidence of intestinal metaplasia, dysplasia or malignancy   No evidence of Helicobacter pylori organisms on H&E stain   Immunostain for Helicobacter pylori organisms has been requested and will be   reported as a supplemental report     Immunostain for Helicobacter pylori organisms, performed with  appropriate   controls, is negative.    Prior Colonoscopy: no     Family h/o Colon Cancer: No  Family h/o Crohn's Disease or Ulcerative Colitis: No  Family h/o Celiac Sprue: No    Review of Systems   Constitutional:  Negative for weight loss.   HENT:  Negative for sore throat.    Eyes:  Negative for blurred vision.   Respiratory:  Negative for cough.    Cardiovascular:  Negative for chest pain.   Gastrointestinal:  Positive for abdominal pain and nausea. Negative for blood in stool, constipation, diarrhea, heartburn, melena and vomiting.   Genitourinary:  Negative for dysuria.   Musculoskeletal:  Negative for myalgias.   Skin:  Negative for rash.   Neurological:  Negative for headaches.   Endo/Heme/Allergies:  Negative for environmental allergies.   Psychiatric/Behavioral:  Negative for suicidal ideas. The patient is not nervous/anxious.        Past Medical History: has a past medical history of Acne, Anxiety disorder, unspecified, Gastro-esophageal reflux disease without esophagitis, Hypertension, and Obesity.    Past Surgical History: has a past surgical history that includes Esophagogastroduodenoscopy (N/A, 05/11/2020).    Family History:family history includes Aneurysm in her maternal grandmother; Cancer in her father; Crohn's disease in her sister; Diabetes in her father and mother; Early death in her mother; Heart disease in her maternal grandfather, mother, and paternal grandmother; Hypertension in her father and mother; Kidney disease in her mother; Stroke in her father.    Allergies: Review of patient's allergies indicates:  No Known Allergies    Social History: reports that she has never smoked. She has never used smokeless tobacco. She reports that she does not currently use alcohol after a past usage of about 2.0 standard drinks of alcohol per week. She reports that she does not use drugs.    Home medications:   Current Outpatient Medications on File Prior to Visit   Medication Sig Dispense Refill     "fluticasone propionate (FLONASE) 50 mcg/actuation nasal spray 1 spray (50 mcg total) by Each Nostril route 2 (two) times daily. 16 g 0    GINGER ROOT-PYRIDOXINE HCL,B6, ORAL Take by mouth.      meclizine (ANTIVERT) 12.5 mg tablet Take 1 tablet (12.5 mg total) by mouth 3 (three) times daily as needed for Dizziness. 30 tablet 0    valsartan-hydrochlorothiazide (DIOVAN-HCT) 80-12.5 mg per tablet Take 1 tablet by mouth once daily. 90 tablet 3     No current facility-administered medications on file prior to visit.       Vital signs:  There were no vitals taken for this visit.    Physical Exam  Constitutional:       Appearance: Normal appearance. She is not ill-appearing.      Comments: Limited Physical Exam d/t Telemedicine Encounter   HENT:      Head: Normocephalic.   Pulmonary:      Effort: Pulmonary effort is normal. No respiratory distress.   Neurological:      Mental Status: She is alert and oriented to person, place, and time.   Psychiatric:         Mood and Affect: Mood normal.         Behavior: Behavior normal.         Thought Content: Thought content normal.         Judgment: Judgment normal.         Routine labs:  Lab Results   Component Value Date    WBC 9.81 06/30/2022    HGB 12.1 06/30/2022    HCT 36.8 (L) 06/30/2022    MCV 86 06/30/2022     06/30/2022     No results found for: "INR"  No results found for: "IRON", "FERRITIN", "TIBC", "FESATURATED"  Lab Results   Component Value Date     04/25/2023    K 4.0 04/25/2023     04/25/2023    CO2 22 (L) 04/25/2023    BUN 11 04/25/2023    CREATININE 0.8 04/25/2023     Lab Results   Component Value Date    ALBUMIN 3.5 04/25/2023    ALT 14 04/25/2023    AST 15 04/25/2023    ALKPHOS 56 04/25/2023    BILITOT 0.3 04/25/2023     No results found for: "GLUCOSE"  Lab Results   Component Value Date    TSH 1.580 02/22/2016     Lab Results   Component Value Date    CALCIUM 9.1 04/25/2023       Imaging:      I have reviewed prior labs, imaging, and " "notes.      Assessment:  1. Dyspepsia    2. Nausea      Globus sensation with accompanied nausea and "sore" stomach. Began few weeks ago. Similar symptoms three years ago that responded with omeprazole and sucralfate.     Plan:  Orders Placed This Encounter    omeprazole (PRILOSEC) 40 MG capsule    sucralfate (CARAFATE) 1 gram tablet    ondansetron (ZOFRAN-ODT) 8 MG TbDL     Omeprazole 40mg daily  Sucralfate TID  Zofran PRN nausea    Plan of care discussed with patient who is in agreement and verbalized understanding.     I have explained the planned procedures to the patient.The risks, benefits and alternatives of the procedure were also explained in detail. Patient verbalized understanding, all questions were answered. The patient agrees to proceed as planned    Follow Up: 12 weeks          FAIZAN Singletary,FNP-BC [facility]    (Portions of this note were dictated using voice recognition software and may contain dictation related errors in spelling/grammar/syntax not found on text review)    "

## 2023-10-16 ENCOUNTER — PATIENT MESSAGE (OUTPATIENT)
Dept: GASTROENTEROLOGY | Facility: CLINIC | Age: 38
End: 2023-10-16
Payer: COMMERCIAL

## 2023-10-16 DIAGNOSIS — K22.4 ESOPHAGEAL SPASM: Primary | ICD-10-CM

## 2023-10-16 RX ORDER — HYOSCYAMINE SULFATE 0.12 MG/1
0.12 TABLET SUBLINGUAL EVERY 6 HOURS PRN
Qty: 30 TABLET | Refills: 1 | Status: SHIPPED | OUTPATIENT
Start: 2023-10-16 | End: 2023-12-07

## 2023-11-17 ENCOUNTER — PATIENT MESSAGE (OUTPATIENT)
Dept: ADMINISTRATIVE | Facility: HOSPITAL | Age: 38
End: 2023-11-17
Payer: COMMERCIAL

## 2023-11-17 ENCOUNTER — TELEPHONE (OUTPATIENT)
Dept: INTERNAL MEDICINE | Facility: CLINIC | Age: 38
End: 2023-11-17
Payer: COMMERCIAL

## 2023-11-17 ENCOUNTER — OFFICE VISIT (OUTPATIENT)
Dept: INTERNAL MEDICINE | Facility: CLINIC | Age: 38
End: 2023-11-17
Payer: COMMERCIAL

## 2023-11-17 VITALS
BODY MASS INDEX: 38.62 KG/M2 | WEIGHT: 240.31 LBS | HEIGHT: 66 IN | DIASTOLIC BLOOD PRESSURE: 80 MMHG | SYSTOLIC BLOOD PRESSURE: 120 MMHG

## 2023-11-17 DIAGNOSIS — J02.9 PHARYNGITIS, UNSPECIFIED ETIOLOGY: ICD-10-CM

## 2023-11-17 DIAGNOSIS — R05.9 COUGH, UNSPECIFIED TYPE: ICD-10-CM

## 2023-11-17 DIAGNOSIS — R09.81 NASAL CONGESTION: ICD-10-CM

## 2023-11-17 DIAGNOSIS — J02.9 SORE THROAT: ICD-10-CM

## 2023-11-17 DIAGNOSIS — J06.9 UPPER RESPIRATORY TRACT INFECTION, UNSPECIFIED TYPE: Primary | ICD-10-CM

## 2023-11-17 LAB
CTP QC/QA: YES
CTP QC/QA: YES
POC MOLECULAR INFLUENZA A AGN: NEGATIVE
POC MOLECULAR INFLUENZA B AGN: NEGATIVE
SARS-COV-2 RDRP RESP QL NAA+PROBE: NEGATIVE

## 2023-11-17 PROCEDURE — 4010F PR ACE/ARB THEARPY RXD/TAKEN: ICD-10-PCS | Mod: CPTII,S$GLB,, | Performed by: NURSE PRACTITIONER

## 2023-11-17 PROCEDURE — 1159F MED LIST DOCD IN RCRD: CPT | Mod: CPTII,S$GLB,, | Performed by: NURSE PRACTITIONER

## 2023-11-17 PROCEDURE — 99214 PR OFFICE/OUTPT VISIT, EST, LEVL IV, 30-39 MIN: ICD-10-PCS | Mod: S$GLB,,, | Performed by: NURSE PRACTITIONER

## 2023-11-17 PROCEDURE — 3008F BODY MASS INDEX DOCD: CPT | Mod: CPTII,S$GLB,, | Performed by: NURSE PRACTITIONER

## 2023-11-17 PROCEDURE — 3008F PR BODY MASS INDEX (BMI) DOCUMENTED: ICD-10-PCS | Mod: CPTII,S$GLB,, | Performed by: NURSE PRACTITIONER

## 2023-11-17 PROCEDURE — 87502 POCT INFLUENZA A/B MOLECULAR: ICD-10-PCS | Mod: QW,S$GLB,, | Performed by: NURSE PRACTITIONER

## 2023-11-17 PROCEDURE — 3044F HG A1C LEVEL LT 7.0%: CPT | Mod: CPTII,S$GLB,, | Performed by: NURSE PRACTITIONER

## 2023-11-17 PROCEDURE — 3074F SYST BP LT 130 MM HG: CPT | Mod: CPTII,S$GLB,, | Performed by: NURSE PRACTITIONER

## 2023-11-17 PROCEDURE — 3079F DIAST BP 80-89 MM HG: CPT | Mod: CPTII,S$GLB,, | Performed by: NURSE PRACTITIONER

## 2023-11-17 PROCEDURE — 87635: ICD-10-PCS | Mod: QW,S$GLB,, | Performed by: NURSE PRACTITIONER

## 2023-11-17 PROCEDURE — 3074F PR MOST RECENT SYSTOLIC BLOOD PRESSURE < 130 MM HG: ICD-10-PCS | Mod: CPTII,S$GLB,, | Performed by: NURSE PRACTITIONER

## 2023-11-17 PROCEDURE — 4010F ACE/ARB THERAPY RXD/TAKEN: CPT | Mod: CPTII,S$GLB,, | Performed by: NURSE PRACTITIONER

## 2023-11-17 PROCEDURE — 87502 INFLUENZA DNA AMP PROBE: CPT | Mod: QW,S$GLB,, | Performed by: NURSE PRACTITIONER

## 2023-11-17 PROCEDURE — 1159F PR MEDICATION LIST DOCUMENTED IN MEDICAL RECORD: ICD-10-PCS | Mod: CPTII,S$GLB,, | Performed by: NURSE PRACTITIONER

## 2023-11-17 PROCEDURE — 3044F PR MOST RECENT HEMOGLOBIN A1C LEVEL <7.0%: ICD-10-PCS | Mod: CPTII,S$GLB,, | Performed by: NURSE PRACTITIONER

## 2023-11-17 PROCEDURE — 87635 SARS-COV-2 COVID-19 AMP PRB: CPT | Mod: QW,S$GLB,, | Performed by: NURSE PRACTITIONER

## 2023-11-17 PROCEDURE — 3079F PR MOST RECENT DIASTOLIC BLOOD PRESSURE 80-89 MM HG: ICD-10-PCS | Mod: CPTII,S$GLB,, | Performed by: NURSE PRACTITIONER

## 2023-11-17 PROCEDURE — 99214 OFFICE O/P EST MOD 30 MIN: CPT | Mod: S$GLB,,, | Performed by: NURSE PRACTITIONER

## 2023-11-17 PROCEDURE — 99999 PR PBB SHADOW E&M-EST. PATIENT-LVL III: ICD-10-PCS | Mod: PBBFAC,,, | Performed by: NURSE PRACTITIONER

## 2023-11-17 PROCEDURE — 99999 PR PBB SHADOW E&M-EST. PATIENT-LVL III: CPT | Mod: PBBFAC,,, | Performed by: NURSE PRACTITIONER

## 2023-11-17 RX ORDER — FLUTICASONE PROPIONATE 50 MCG
1 SPRAY, SUSPENSION (ML) NASAL DAILY
Qty: 16 G | Refills: 2 | Status: SHIPPED | OUTPATIENT
Start: 2023-11-17

## 2023-11-17 RX ORDER — AZITHROMYCIN 250 MG/1
TABLET, FILM COATED ORAL
Qty: 6 TABLET | Refills: 0 | Status: SHIPPED | OUTPATIENT
Start: 2023-11-17 | End: 2023-11-22

## 2023-11-17 RX ORDER — BENZONATATE 100 MG/1
100 CAPSULE ORAL 3 TIMES DAILY PRN
Qty: 30 CAPSULE | Refills: 0 | Status: SHIPPED | OUTPATIENT
Start: 2023-11-17 | End: 2023-11-27

## 2023-11-17 NOTE — PROGRESS NOTES
INTERNAL MEDICINE CLINIC - SAME DAY APPOINTMENT  Progress Note    PRESENTING HISTORY     PCP: Jona Conley Jr., MD    Chief Complaint/Reason for Visit:   No chief complaint on file.     History of Present Illness & ROS : Ms. Kaylin Vargas is a 38 y.o. female.    Same day apt  Very pleasant young lady.   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   Reports that 3-4 days ago, began to experience 'cough, feeling feverish, nasal congestion, fatigue and cold symptoms, throat feels scratchy'; tried taking Dayquil, Tylenol and Coricidin. Not home tested, but concerned about COVID. Was a 'cruise' recently. No GI symptoms.   No resting SOB or chest wall discomforts, and does not endorse coughing up any secretions. 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     Review of Systems:  Eyes: denies visual changes at this time denies floaters  Cardiovascular: no chest pain or palpitations  Gastrointestinal: no nausea or vomiting, no abdominal pain or change in bowel habits  Genitourinary: no hematuria or dysuria; denies frequency  Hematologic/Lymphatic: no easy bruising or lymphadenopathy  Musculoskeletal: no arthralgias or myalgias  Neurological: no seizures or tremors  Endocrine: no heat or cold intolerance      PAST HISTORY:     Past Medical History:   Diagnosis Date    Acne     Anxiety disorder, unspecified 04/30/2020    Gastro-esophageal reflux disease without esophagitis 04/30/2020    Hypertension     Obesity        Past Surgical History:   Procedure Laterality Date    ESOPHAGOGASTRODUODENOSCOPY N/A 05/11/2020    Procedure: EGD (ESOPHAGOGASTRODUODENOSCOPY);  Surgeon: Ramiro Trejo Jr., MD;  Location: [facility];  Service: Endoscopy;  Laterality: N/A;       Family History   Problem Relation Age of Onset    Diabetes Father     Hypertension Father     Cancer Father     Stroke Father     Hypertension Mother     Kidney disease Mother     Heart disease Mother     Diabetes  Mother     Early death Mother     Crohn's disease Sister     Aneurysm Maternal Grandmother     Heart disease Maternal Grandfather     Heart disease Paternal Grandmother     Breast cancer Neg Hx     Colon cancer Neg Hx     Ovarian cancer Neg Hx     Melanoma Neg Hx        Social History     Socioeconomic History    Marital status:     Number of children: 2   Tobacco Use    Smoking status: Never    Smokeless tobacco: Never   Substance and Sexual Activity    Alcohol use: Not Currently     Alcohol/week: 2.0 standard drinks of alcohol     Types: 2 Glasses of wine per week     Comment: social    Drug use: Never    Sexual activity: Yes     Partners: Male     Birth control/protection: None   Social History Narrative    ** Merged History Encounter **         . Adolescent children.      Social Determinants of Health     Financial Resource Strain: Low Risk  (8/10/2023)    Overall Financial Resource Strain (CARDIA)     Difficulty of Paying Living Expenses: Not very hard   Food Insecurity: No Food Insecurity (8/10/2023)    Hunger Vital Sign     Worried About Running Out of Food in the Last Year: Never true     Ran Out of Food in the Last Year: Never true   Transportation Needs: No Transportation Needs (8/10/2023)    PRAPARE - Transportation     Lack of Transportation (Medical): No     Lack of Transportation (Non-Medical): No   Physical Activity: Insufficiently Active (8/10/2023)    Exercise Vital Sign     Days of Exercise per Week: 1 day     Minutes of Exercise per Session: 30 min   Stress: Stress Concern Present (8/10/2023)    [place] citizen [place] of Occupational Health - Occupational Stress Questionnaire     Feeling of Stress : Rather much   Social Connections: Unknown (8/10/2023)    Social Connection and Isolation Panel [NHANES]     Frequency of Communication with Friends and Family: Once a week     Frequency of Social Gatherings with Friends and Family: Once a week     Active Member of Clubs or Organizations: Yes      Attends Club or Organization Meetings: More than 4 times per year     Marital Status:    Housing Stability: Low Risk  (8/10/2023)    Housing Stability Vital Sign     Unable to Pay for Housing in the Last Year: No     Number of Places Lived in the Last Year: 1     Unstable Housing in the Last Year: No   Recent Concern: Housing Stability - High Risk (7/18/2023)    Housing Stability Vital Sign     Unable to Pay for Housing in the Last Year: No     Number of Places Lived in the Last Year: 1     Unstable Housing in the Last Year: Yes       MEDICATIONS & ALLERGIES:     Current Outpatient Medications on File Prior to Visit   Medication Sig Dispense Refill    fluticasone propionate (FLONASE) 50 mcg/actuation nasal spray 1 spray (50 mcg total) by Each Nostril route 2 (two) times daily. 16 g 0    GINGER ROOT-PYRIDOXINE HCL,B6, ORAL Take by mouth.      hyoscyamine (LEVSIN/SL) 0.125 mg Subl Place 1 tablet (0.125 mg total) under the tongue every 6 (six) hours as needed (esophageal spasm). 30 tablet 1    meclizine (ANTIVERT) 12.5 mg tablet Take 1 tablet (12.5 mg total) by mouth 3 (three) times daily as needed for Dizziness. 30 tablet 0    omeprazole (PRILOSEC) 40 MG capsule Take 1 capsule (40 mg total) by mouth once daily. 30 capsule 11    ondansetron (ZOFRAN-ODT) 8 MG TbDL Dissolve 1 tablet (8 mg total) by mouth every 6 (six) hours as needed (nausea). 30 tablet 1    sucralfate (CARAFATE) 1 gram tablet Take 1 tablet (1 g total) by mouth 3 (three) times daily. 90 tablet 2    valsartan-hydrochlorothiazide (DIOVAN-HCT) 80-12.5 mg per tablet Take 1 tablet by mouth once daily. 90 tablet 3     No current facility-administered medications on file prior to visit.        Review of patient's allergies indicates:  No Known Allergies    Medications Reconciliation:   I have reconciled the patient's home medications with the patient/family. I have updated all changes.  Refer to After-Visit Medication List.    OBJECTIVE:     Vital  Signs:  There were no vitals filed for this visit.  Wt Readings from Last 3 Encounters:   08/10/23 0833 110.2 kg (242 lb 15.2 oz)   07/18/23 1348 112 kg (246 lb 14.6 oz)   04/06/23 0808 113.3 kg (249 lb 12.5 oz)     There is no height or weight on file to calculate BMI.   Wt Readings from Last 3 Encounters:   11/17/23 109 kg (240 lb 4.8 oz)   08/10/23 110.2 kg (242 lb 15.2 oz)   07/18/23 112 kg (246 lb 14.6 oz)     Temp Readings from Last 3 Encounters:   07/18/23 98.3 °F (36.8 °C)   01/14/23 98.6 °F (37 °C)   08/04/22 98.8 °F (37.1 °C) (Oral)     BP Readings from Last 3 Encounters:   11/17/23 120/80   08/10/23 118/82   07/18/23 117/72     Pulse Readings from Last 3 Encounters:   08/10/23 90   07/18/23 96   04/06/23 98       Physical Exam:  (Focused Exam)  General: Well developed, well nourished. No distress.  HEENT: Head is normocephalic, atraumatic; ears are normal.   Posterior oral pharynx: +moderate erythema, no exudate or purulence  Eyes: Clear conjunctiva.  Neck: Supple, symmetrical neck; trachea midline.  Lungs: Clear to auscultation bilaterally and normal respiratory effort.  Cardiovascular: Heart with regular rate and rhythm. No murmurs, gallops or rubs  Extremities: No LE edema. Pulses 2+ and symmetric.   Skin: Skin color, texture, turgor normal. No rashes.  Musculoskeletal: Normal gait.   Neurologic: Normal strength and tone. No focal numbness or weakness.       Laboratory  Lab Results   Component Value Date    WBC 9.81 06/30/2022    HGB 12.1 06/30/2022    HCT 36.8 (L) 06/30/2022     06/30/2022    CHOL 151 09/29/2022    TRIG 93 09/29/2022    HDL 46 09/29/2022    ALT 14 04/25/2023    AST 15 04/25/2023     04/25/2023    K 4.0 04/25/2023     04/25/2023    CREATININE 0.8 04/25/2023    BUN 11 04/25/2023    CO2 22 (L) 04/25/2023    TSH 1.580 02/22/2016    HGBA1C 5.6 04/25/2023           ASSESSMENT & PLAN:     Same day apt.     Upper respiratory tract infection, unspecified type    Pharyngitis,  unspecified etiology    Cough, unspecified type    Sore throat    Nasal congestion  -     POCT Influenza A/B Molecular (Negative)  -     POCT COVID-19 Rapid Screening (Negative)    Other orders  -     azithromycin (Z-YONIS) 250 MG tablet; Take 2 tablets by mouth on day 1; Take 1 tablet by mouth on days 2-5  Dispense: 6 tablet; Refill: 0  -     benzonatate (TESSALON) 100 MG capsule; Take 1 capsule (100 mg total) by mouth 3 (three) times daily as needed for Cough.  Dispense: 30 capsule; Refill: 0  -     fluticasone propionate (FLONASE) 50 mcg/actuation nasal spray; 1 spray (50 mcg total) by Each Nostril route once daily.  Dispense: 16 g; Refill: 2      Future Appointments   Date Time Provider Department Center   11/17/2023  3:30 PM Jennifer Will FNP [facility]   12/18/2023  3:00 PM Caitlyn Granados NP [facility]   1/17/2024  3:00 PM Vishnu Fernandez MD [facility]   8/9/2024  8:00 AM Jona Conley Jr., MD [facility]        Medication List            Accurate as of November 17, 2023  1:28 PM. If you have any questions, ask your nurse or doctor.                CONTINUE taking these medications      fluticasone propionate 50 mcg/actuation nasal spray  Commonly known as: FLONASE  1 spray (50 mcg total) by Each Nostril route 2 (two) times daily.     GINGER ROOT-PYRIDOXINE HCL(B6) ORAL     hyoscyamine 0.125 mg Subl  Commonly known as: LEVSIN/SL  Place 1 tablet (0.125 mg total) under the tongue every 6 (six) hours as needed (esophageal spasm).     meclizine 12.5 mg tablet  Commonly known as: ANTIVERT  Take 1 tablet (12.5 mg total) by mouth 3 (three) times daily as needed for Dizziness.     omeprazole 40 MG capsule  Commonly known as: PRILOSEC  Take 1 capsule (40 mg total) by mouth once daily.     ondansetron 8 MG Tbdl  Commonly known as: ZOFRAN-ODT  Dissolve 1 tablet (8 mg total) by mouth every 6 (six) hours as needed (nausea).     sucralfate 1 gram tablet  Commonly known  as: CARAFATE  Take 1 tablet (1 g total) by mouth 3 (three) times daily.     valsartan-hydrochlorothiazide 80-12.5 mg per tablet  Commonly known as: DIOVAN-HCT  Take 1 tablet by mouth once daily.              Signing Physician:  MARISSA Acosta

## 2023-12-06 ENCOUNTER — OFFICE VISIT (OUTPATIENT)
Dept: OBSTETRICS AND GYNECOLOGY | Facility: CLINIC | Age: 38
End: 2023-12-06
Attending: OBSTETRICS & GYNECOLOGY
Payer: COMMERCIAL

## 2023-12-06 VITALS
DIASTOLIC BLOOD PRESSURE: 70 MMHG | WEIGHT: 240.5 LBS | HEIGHT: 66 IN | SYSTOLIC BLOOD PRESSURE: 110 MMHG | BODY MASS INDEX: 38.65 KG/M2

## 2023-12-06 DIAGNOSIS — Z01.419 WELL FEMALE EXAM WITH ROUTINE GYNECOLOGICAL EXAM: Primary | ICD-10-CM

## 2023-12-06 PROBLEM — Z20.828 CONTACT W AND EXPOSURE TO OTH VIRAL COMMUNICABLE DISEASES: Status: RESOLVED | Noted: 2020-03-20 | Resolved: 2023-12-06

## 2023-12-06 PROBLEM — R10.13 EPIGASTRIC PAIN: Status: RESOLVED | Noted: 2020-05-11 | Resolved: 2023-12-06

## 2023-12-06 PROCEDURE — 3044F HG A1C LEVEL LT 7.0%: CPT | Mod: CPTII,S$GLB,, | Performed by: OBSTETRICS & GYNECOLOGY

## 2023-12-06 PROCEDURE — 1160F RVW MEDS BY RX/DR IN RCRD: CPT | Mod: CPTII,S$GLB,, | Performed by: OBSTETRICS & GYNECOLOGY

## 2023-12-06 PROCEDURE — 99395 PR PREVENTIVE VISIT,EST,18-39: ICD-10-PCS | Mod: S$GLB,,, | Performed by: OBSTETRICS & GYNECOLOGY

## 2023-12-06 PROCEDURE — 3044F PR MOST RECENT HEMOGLOBIN A1C LEVEL <7.0%: ICD-10-PCS | Mod: CPTII,S$GLB,, | Performed by: OBSTETRICS & GYNECOLOGY

## 2023-12-06 PROCEDURE — 3078F DIAST BP <80 MM HG: CPT | Mod: CPTII,S$GLB,, | Performed by: OBSTETRICS & GYNECOLOGY

## 2023-12-06 PROCEDURE — 1159F PR MEDICATION LIST DOCUMENTED IN MEDICAL RECORD: ICD-10-PCS | Mod: CPTII,S$GLB,, | Performed by: OBSTETRICS & GYNECOLOGY

## 2023-12-06 PROCEDURE — 4010F ACE/ARB THERAPY RXD/TAKEN: CPT | Mod: CPTII,S$GLB,, | Performed by: OBSTETRICS & GYNECOLOGY

## 2023-12-06 PROCEDURE — 3078F PR MOST RECENT DIASTOLIC BLOOD PRESSURE < 80 MM HG: ICD-10-PCS | Mod: CPTII,S$GLB,, | Performed by: OBSTETRICS & GYNECOLOGY

## 2023-12-06 PROCEDURE — 1160F PR REVIEW ALL MEDS BY PRESCRIBER/CLIN PHARMACIST DOCUMENTED: ICD-10-PCS | Mod: CPTII,S$GLB,, | Performed by: OBSTETRICS & GYNECOLOGY

## 2023-12-06 PROCEDURE — 3074F PR MOST RECENT SYSTOLIC BLOOD PRESSURE < 130 MM HG: ICD-10-PCS | Mod: CPTII,S$GLB,, | Performed by: OBSTETRICS & GYNECOLOGY

## 2023-12-06 PROCEDURE — 1159F MED LIST DOCD IN RCRD: CPT | Mod: CPTII,S$GLB,, | Performed by: OBSTETRICS & GYNECOLOGY

## 2023-12-06 PROCEDURE — 99999 PR PBB SHADOW E&M-EST. PATIENT-LVL III: ICD-10-PCS | Mod: PBBFAC,,, | Performed by: OBSTETRICS & GYNECOLOGY

## 2023-12-06 PROCEDURE — 3008F BODY MASS INDEX DOCD: CPT | Mod: CPTII,S$GLB,, | Performed by: OBSTETRICS & GYNECOLOGY

## 2023-12-06 PROCEDURE — 99395 PREV VISIT EST AGE 18-39: CPT | Mod: S$GLB,,, | Performed by: OBSTETRICS & GYNECOLOGY

## 2023-12-06 PROCEDURE — 88175 CYTOPATH C/V AUTO FLUID REDO: CPT | Performed by: OBSTETRICS & GYNECOLOGY

## 2023-12-06 PROCEDURE — 3008F PR BODY MASS INDEX (BMI) DOCUMENTED: ICD-10-PCS | Mod: CPTII,S$GLB,, | Performed by: OBSTETRICS & GYNECOLOGY

## 2023-12-06 PROCEDURE — 99999 PR PBB SHADOW E&M-EST. PATIENT-LVL III: CPT | Mod: PBBFAC,,, | Performed by: OBSTETRICS & GYNECOLOGY

## 2023-12-06 PROCEDURE — 3074F SYST BP LT 130 MM HG: CPT | Mod: CPTII,S$GLB,, | Performed by: OBSTETRICS & GYNECOLOGY

## 2023-12-06 PROCEDURE — 4010F PR ACE/ARB THEARPY RXD/TAKEN: ICD-10-PCS | Mod: CPTII,S$GLB,, | Performed by: OBSTETRICS & GYNECOLOGY

## 2023-12-06 NOTE — PROGRESS NOTES
SUBJECTIVE:   38 y.o. female   for routine gyn exam. Patient's last menstrual period was 2023 (approximate)..  She c/o vaginal d/c.         Past Medical History:   Diagnosis Date    Acne     Anxiety disorder, unspecified 2020    Gastro-esophageal reflux disease without esophagitis 2020    Hypertension     Obesity      Past Surgical History:   Procedure Laterality Date    ESOPHAGOGASTRODUODENOSCOPY N/A 2020    Procedure: EGD (ESOPHAGOGASTRODUODENOSCOPY);  Surgeon: Ramiro Trejo Jr., MD;  Location: [facility];  Service: Endoscopy;  Laterality: N/A;     Social History     Socioeconomic History    Marital status:     Number of children: 2   Tobacco Use    Smoking status: Never    Smokeless tobacco: Never   Substance and Sexual Activity    Alcohol use: Yes     Alcohol/week: 2.0 standard drinks of alcohol     Types: 2 Glasses of wine per week     Comment: social    Drug use: Never    Sexual activity: Yes     Partners: Male     Birth control/protection: None   Social History Narrative    ** Merged History Encounter **         . Adolescent children.      Social Determinants of Health     Financial Resource Strain: Low Risk  (8/10/2023)    Overall Financial Resource Strain (CARDIA)     Difficulty of Paying Living Expenses: Not very hard   Food Insecurity: No Food Insecurity (8/10/2023)    Hunger Vital Sign     Worried About Running Out of Food in the Last Year: Never true     Ran Out of Food in the Last Year: Never true   Transportation Needs: No Transportation Needs (8/10/2023)    PRAPARE - Transportation     Lack of Transportation (Medical): No     Lack of Transportation (Non-Medical): No   Physical Activity: Insufficiently Active (8/10/2023)    Exercise Vital Sign     Days of Exercise per Week: 1 day     Minutes of Exercise per Session: 30 min   Stress: Stress Concern Present (8/10/2023)    [place] of Occupational Health - Occupational Stress Questionnaire      Feeling of Stress : Rather much   Social Connections: Unknown (8/10/2023)    Social Connection and Isolation Panel [NHANES]     Frequency of Communication with Friends and Family: Once a week     Frequency of Social Gatherings with Friends and Family: Once a week     Active Member of Clubs or Organizations: Yes     Attends Club or Organization Meetings: More than 4 times per year     Marital Status:    Housing Stability: Low Risk  (8/10/2023)    Housing Stability Vital Sign     Unable to Pay for Housing in the Last Year: No     Number of Places Lived in the Last Year: 1     Unstable Housing in the Last Year: No   Recent Concern: Housing Stability - High Risk (2023)    Housing Stability Vital Sign     Unable to Pay for Housing in the Last Year: No     Number of Places Lived in the Last Year: 1     Unstable Housing in the Last Year: Yes     Family History   Problem Relation Age of Onset    Diabetes Father     Hypertension Father     Cancer Father     Stroke Father     Hypertension Mother     Kidney disease Mother     Heart disease Mother     Diabetes Mother     Early death Mother     Crohn's disease Sister     Aneurysm Maternal Grandmother     Heart disease Maternal Grandfather     Heart disease Paternal Grandmother     Breast cancer Neg Hx     Colon cancer Neg Hx     Ovarian cancer Neg Hx     Melanoma Neg Hx      OB History    Para Term  AB Living   2 2 2 0 0 2   SAB IAB Ectopic Multiple Live Births   0 0 0   2      # Outcome Date GA Lbr Erese/2nd Weight Sex Delivery Anes PTL Lv   2 Term      Vag-Spont  N DON   1 Term      Vag-Spont  N DON         Current Outpatient Medications   Medication Sig Dispense Refill    fluticasone propionate (FLONASE) 50 mcg/actuation nasal spray 1 spray (50 mcg total) by Each Nostril route once daily. 16 g 2    [place] ROOT-PYRIDOXINE HCL,B6, ORAL Take by mouth.      meclizine (ANTIVERT) 12.5 mg tablet Take 1 tablet (12.5 mg total) by mouth 3 (three) times daily as  "needed for Dizziness. 30 tablet 0    omeprazole (PRILOSEC) 40 MG capsule Take 1 capsule (40 mg total) by mouth once daily. 30 capsule 11    sucralfate (CARAFATE) 1 gram tablet Take 1 tablet (1 g total) by mouth 3 (three) times daily. 90 tablet 2    valsartan-hydrochlorothiazide (DIOVAN-HCT) 80-12.5 mg per tablet Take 1 tablet by mouth once daily. 90 tablet 3    hyoscyamine (LEVSIN/SL) 0.125 mg Subl Place 1 tablet (0.125 mg total) under the tongue every 6 (six) hours as needed (esophageal spasm). 30 tablet 1     No current facility-administered medications for this visit.     Allergies: Patient has no known allergies.     ROS:  Constitutional: no weight loss, weight gain, fever, fatigue  Eyes:  No vision changes, glasses/contacts  ENT/Mouth: No ulcers, sinus problems, ears ringing, headache  Cardiovascular: No inability to lie flat, chest pain, exercise intolerance, swelling, heart palpitations  Respiratory: No wheezing, coughing blood, shortness of breath, or cough  Gastrointestinal: No diarrhea, bloody stool, nausea/vomiting, constipation, gas, hemorrhoids  Genitourinary: No blood in urine, painful urination, urgency of urination, frequency of urination, incomplete emptying, incontinence, abnormal bleeding, painful periods, heavy periods, +vaginal discharge, vaginal odor, painful intercourse, sexual problems, bleeding after intercourse.  Musculoskeletal: No muscle weakness  Skin/Breast: No painful breasts, nipple discharge, masses, rash, ulcers  Neurological: No passing out, seizures, numbness, headache  Endocrine: No diabetes, hypothyroid, hyperthyroid, hot flashes, hair loss, abnormal hair growth, acne  Psychiatric: No depression, crying  Hematologic: No bruises, bleeding, swollen lymph nodes, anemia.      OBJECTIVE:   The patient appears well, alert, oriented x 3, in no distress.  /70 (BP Location: Left arm, Patient Position: Sitting, BP Method: Large (Manual))   Ht 5' 6" (1.676 m)   Wt 109.1 kg (240 lb " 8.4 oz)   LMP 11/07/2023 (Approximate)   BMI 38.82 kg/m²   NECK: no thyromegaly, trachea midline  SKIN: no acne, striae, hirsutism  CHEST: CTAB  CV: RRR  BREAST EXAM: breasts appear normal, no suspicious masses, no skin or nipple changes or axillary nodes  ABDOMEN: no hernias, masses, or hepatosplenomegaly  GENITALIA: normal external genitalia, no erythema, no discharge  URETHRA: normal urethra, normal urethral meatus  VAGINA: Normal  CERVIX: no lesions or cervical motion tenderness  UTERUS: normal size, contour, position, consistency, mobility, non-tender  ADNEXA: no mass, fullness, tenderness      ASSESSMENT:   1. Well female exam with routine gynecological exam  Liquid-Based Pap Smear, Screening          PLAN:   Orders Placed This Encounter    Liquid-Based Pap Smear, Screening     Discussed Boric acid vag suppositories as needed, healthy lifestyle including regular exercise, healthy eating, etc.  Return to clinic in 1 year

## 2023-12-18 ENCOUNTER — PATIENT MESSAGE (OUTPATIENT)
Dept: INTERNAL MEDICINE | Facility: CLINIC | Age: 38
End: 2023-12-18
Payer: COMMERCIAL

## 2023-12-19 LAB
FINAL PATHOLOGIC DIAGNOSIS: NORMAL
Lab: NORMAL

## 2024-01-16 ENCOUNTER — IMMUNIZATION (OUTPATIENT)
Dept: INTERNAL MEDICINE | Facility: CLINIC | Age: 39
End: 2024-01-16
Payer: COMMERCIAL

## 2024-01-16 DIAGNOSIS — Z23 NEED FOR VACCINATION: Primary | ICD-10-CM

## 2024-01-16 PROCEDURE — 91322 SARSCOV2 VAC 50 MCG/0.5ML IM: CPT | Mod: S$GLB,,, | Performed by: INTERNAL MEDICINE

## 2024-01-16 PROCEDURE — 90480 ADMN SARSCOV2 VAC 1/ONLY CMP: CPT | Mod: S$GLB,,, | Performed by: INTERNAL MEDICINE

## 2024-01-18 ENCOUNTER — TELEPHONE (OUTPATIENT)
Dept: BARIATRICS | Facility: CLINIC | Age: 39
End: 2024-01-18
Payer: COMMERCIAL

## 2024-01-18 NOTE — TELEPHONE ENCOUNTER
Pt on bariatric work queue list. FS appt for bar medicine already scheduled for 2/28/2024. Status updated to financial appt scheduled.

## 2024-01-25 ENCOUNTER — PATIENT MESSAGE (OUTPATIENT)
Dept: ORTHOPEDICS | Facility: CLINIC | Age: 39
End: 2024-01-25
Payer: COMMERCIAL

## 2024-01-25 DIAGNOSIS — M79.641 BILATERAL HAND PAIN: Primary | ICD-10-CM

## 2024-01-25 DIAGNOSIS — M79.642 BILATERAL HAND PAIN: Primary | ICD-10-CM

## 2024-01-30 ENCOUNTER — HOSPITAL ENCOUNTER (OUTPATIENT)
Dept: RADIOLOGY | Facility: HOSPITAL | Age: 39
Discharge: HOME | End: 2024-01-30
Attending: ORTHOPAEDIC SURGERY
Payer: COMMERCIAL

## 2024-01-30 ENCOUNTER — OFFICE VISIT (OUTPATIENT)
Dept: ORTHOPEDICS | Facility: CLINIC | Age: 39
End: 2024-01-30
Payer: COMMERCIAL

## 2024-01-30 ENCOUNTER — PATIENT MESSAGE (OUTPATIENT)
Dept: ORTHOPEDICS | Facility: CLINIC | Age: 39
End: 2024-01-30

## 2024-01-30 VITALS — BODY MASS INDEX: 38.57 KG/M2 | WEIGHT: 240 LBS | HEIGHT: 66 IN

## 2024-01-30 DIAGNOSIS — M79.641 BILATERAL HAND PAIN: ICD-10-CM

## 2024-01-30 DIAGNOSIS — M79.642 BILATERAL HAND PAIN: ICD-10-CM

## 2024-01-30 DIAGNOSIS — G56.01 CARPAL TUNNEL SYNDROME OF RIGHT WRIST: ICD-10-CM

## 2024-01-30 DIAGNOSIS — G56.02 CARPAL TUNNEL SYNDROME OF LEFT WRIST: Primary | ICD-10-CM

## 2024-01-30 PROCEDURE — 20526 THER INJECTION CARP TUNNEL: CPT | Mod: 50,S$GLB,, | Performed by: ORTHOPAEDIC SURGERY

## 2024-01-30 PROCEDURE — 99213 OFFICE O/P EST LOW 20 MIN: CPT | Mod: 25,S$GLB,, | Performed by: ORTHOPAEDIC SURGERY

## 2024-01-30 PROCEDURE — 73130 X-RAY EXAM OF HAND: CPT | Mod: 26,50,, | Performed by: RADIOLOGY

## 2024-01-30 PROCEDURE — 1159F MED LIST DOCD IN RCRD: CPT | Mod: CPTII,S$GLB,, | Performed by: ORTHOPAEDIC SURGERY

## 2024-01-30 PROCEDURE — 73130 X-RAY EXAM OF HAND: CPT | Mod: TC,50

## 2024-01-30 PROCEDURE — 3008F BODY MASS INDEX DOCD: CPT | Mod: CPTII,S$GLB,, | Performed by: ORTHOPAEDIC SURGERY

## 2024-01-30 PROCEDURE — 99999 PR PBB SHADOW E&M-EST. PATIENT-LVL III: CPT | Mod: PBBFAC,,, | Performed by: ORTHOPAEDIC SURGERY

## 2024-01-30 RX ORDER — DEXAMETHASONE SODIUM PHOSPHATE 4 MG/ML
4 INJECTION, SOLUTION INTRA-ARTICULAR; INTRALESIONAL; INTRAMUSCULAR; INTRAVENOUS; SOFT TISSUE
Status: DISCONTINUED | OUTPATIENT
Start: 2024-01-30 | End: 2024-01-30 | Stop reason: HOSPADM

## 2024-01-30 RX ADMIN — DEXAMETHASONE SODIUM PHOSPHATE 4 MG: 4 INJECTION, SOLUTION INTRA-ARTICULAR; INTRALESIONAL; INTRAMUSCULAR; INTRAVENOUS; SOFT TISSUE at 08:01

## 2024-01-30 NOTE — PROCEDURES
Carpal Tunnel    Date/Time: 1/30/2024 8:15 AM    Performed by: Yrn Richards MD  Authorized by: Yrn Richards MD    Consent Done?:  Yes (Verbal)  Site marked: the procedure site was marked    Timeout: prior to procedure the correct patient, procedure, and site was verified    Prep: patient was prepped and draped in usual sterile fashion      Local anesthesia used?: Yes    Local anesthetic:  Topical anesthetic  Location:  Wrist (right carpal tunnel)  Ultrasonic Guidance for Needle Placement?: No    Needle size:  25 G  Approach:  Volar  Medications:  4 mg dexAMETHasone 4 mg/mL  Patient tolerance:  Patient tolerated the procedure well with no immediate complications

## 2024-01-30 NOTE — PROGRESS NOTES
[facility]  History & Physical  Orthopedics    SUBJECTIVE:      COVID-19 attestation:  This patient was treated during the COVID-19 pandemic.  This was discussed with the patient, they are aware of our current policies and procedures, were given the option of delaying their visit and or switching to a virtual visit, delaying their surgery when applicable, and they elect to proceed.    Chief Complaint:  Bilateral hand pain numbness and tingling    Referring Provider: No ref. provider found     Interval History 3/7/23:  Patient returns to clinic for f/u on EMG findings which were significant for bilateral moderate severity median nerve mononeuropathy. She states that using the brace at night has provided little relief as has using NSAIDs. She also complains of bilateral thumb locking and thumb base pain that has been present for months. She is interested in receiving an injection at this time and learning more about surgery.     History of Present Illness:  Patient is a 38 y.o. right hand dominant female who presents today with complaints of bilateral hand pain numbness and tingling.  The patient notes that this has been happening for the past month or so.  She notes that this is a recurrence from 2 years ago when she initially had symptoms which then improved with work changes.  She notes that it is really the entirety of her bilateral hands which is involved in the right greater than left hands.  She denies any other issues today presents for initial evaluation.     Interval history January 30, 2024: The patient returns today for re-evaluation.  She notes that her bilateral carpal tunnel injections given last March helped her tremendously for at least 6 months.  Symptoms have only recently returned but they are quite severe and she rates her pain 10/10 today.  She denies other complaints and returns for re-evaluation.    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    Onset  of symptoms/DOI was 1 month ago.    Symptoms are aggravated by activity, movement, and at night.    Symptoms are alleviated by rest.    Symptoms consist of pain and numbness/tingling.    The patient rates their pain as 10/10    Attempted treatment(s) and/or interventions include activity modifications, rest  The patient denies any fevers, chills, N/V, D/C and presents for evaluation.       Past Medical History:   Diagnosis Date    Acne     Anxiety disorder, unspecified 04/30/2020    Gastro-esophageal reflux disease without esophagitis 04/30/2020    Hypertension     Obesity      Past Surgical History:   Procedure Laterality Date    ESOPHAGOGASTRODUODENOSCOPY N/A 05/11/2020    Procedure: EGD (ESOPHAGOGASTRODUODENOSCOPY);  Surgeon: Ramiro Trejo Jr., MD;  Location: [facility];  Service: Endoscopy;  Laterality: N/A;     Review of patient's allergies indicates:  No Known Allergies  Social History     Social History Narrative    ** Merged History Encounter **         . Adolescent children.      Family History   Problem Relation Age of Onset    Diabetes Father     Hypertension Father     Cancer Father     Stroke Father     Hypertension Mother     Kidney disease Mother     Heart disease Mother     Diabetes Mother     Early death Mother     Crohn's disease Sister     Aneurysm Maternal Grandmother     Heart disease Maternal Grandfather     Heart disease Paternal Grandmother     Breast cancer Neg Hx     Colon cancer Neg Hx     Ovarian cancer Neg Hx     Melanoma Neg Hx          Current Outpatient Medications:     fluticasone propionate (FLONASE) 50 mcg/actuation nasal spray, 1 spray (50 mcg total) by Each Nostril route once daily., Disp: 16 g, Rfl: 2    GINGER ROOT-PYRIDOXINE HCL,B6, ORAL, Take by mouth., Disp: , Rfl:     meclizine (ANTIVERT) 12.5 mg tablet, Take 1 tablet (12.5 mg total) by mouth 3 (three) times daily as needed for Dizziness., Disp: 30 tablet, Rfl: 0    omeprazole (PRILOSEC) 40 MG capsule, Take 1  "capsule (40 mg total) by mouth once daily., Disp: 30 capsule, Rfl: 11    valsartan-hydrochlorothiazide (DIOVAN-HCT) 80-12.5 mg per tablet, Take 1 tablet by mouth once daily., Disp: 90 tablet, Rfl: 3      Review of Systems:  As per HPI otherwise noncontributory    OBJECTIVE:      Vital Signs (Most Recent):  Vitals:    01/30/24 0819   Weight: 108.9 kg (240 lb)   Height: 5' 6" (1.676 m)       Body mass index is 38.74 kg/m².      Physical Exam:  Constitutional: The patient appears well-developed and well-nourished. No distress.   Skin: No lesions appreciated  Head: Normocephalic and atraumatic.   Nose: Nose normal.   Ears: No deformities seen  Eyes: Conjunctivae and EOM are normal.   Neck: No tracheal deviation present.   Cardiovascular: Normal rate and intact distal pulses.    Pulmonary/Chest: Effort normal. No respiratory distress.   Abdominal: There is no guarding.   Neurological: The patient is alert.   Psychiatric: The patient has a normal mood and affect.     Bilateral Hand/Wrist Examination:    Observation/Inspection:  Swelling  none    Deformity  none  Discoloration  none     Scars   none    Atrophy  none    HAND/WRIST EXAMINATION:  Finkelstein's Test   Neg  WHAT Test    Neg  Snuff box tenderness   Neg  Mckeon's Test    Neg  Hook of Hamate Tenderness  Neg  CMC grind    Neg  Circumduction test   Neg  Pain to palpation of thumbs bilaterally over A1 pulley    Neurovascular Exam:  Digits WWP, brisk CR < 3s throughout  NVI motor/LTS to M/R/U nerves, radial pulse 2+  Tinel's Test - Carpal Tunnel  positive  Tinel's Test - Cubital Tunnel  Neg  Phalen's Test    Neg  Median Nerve Compression Test positive    ROM hand full, painless    ROM wrist full, painless    ROM elbow full, painless    Abdomen not guarded  Respirations nonlabored  Perfusion intact    Diagnostic Results:     Imaging - I independently viewed the patient's imaging as well as the radiology report.  Xrays of the patient's bilateral hands  demonstrate no " evidence of any acute fractures or dislocations or significant degenerative changes.    EMG - bilateral moderate severity median nerve mononeuropathy     ASSESSMENT/PLAN:      38 y.o. yo female with bilateral carpal tunnel syndrome  Plan: The patient and I had a thorough discussion today.  We discussed the working diagnosis as well as several other potential alternative diagnoses.  Treatment options were discussed, both conservative and surgical.  Conservative treatment options would include things such as activity modifications, workplace modifications, a period of rest, oral vs topical OTC and prescription anti-inflammatory medications, occupational therapy, splinting/bracing, immobilization, corticosteroid injections, and others.  Surgical options were discussed as well.     At this time, the patient would like to proceed with a repeat trial of bilateral carpal tunnel injections.  She got great relief from these last time.  She does understand that my recommendation is for surgical carpal tunnel release.  Now is not a good time but she understands that at her next visit we will likely begin to plan this.      Follow up as symptoms dictate and when ready to discuss carpal tunnel surgery.  Should the patient's symptoms worsen, persist, or fail to improve they should return for reevaluation and I would be happy to see them back anytime.        Yrn Richards M.D.    Please be aware that this note has been generated with the assistance of [employer] voice-to-text.  Please excuse any spelling or grammatical errors.    Thank you for choosing Dr. Yrn Richards for your orthopedic hand and upper extremity care. It is our goal to provide you with exceptional care that will help keep you healthy, active, and get you back in the game.     If you felt that you received exemplary care today, please consider leaving feedback for Dr. Richards on [email or web address] at [email or web address].    Please do  not hesitate to reach out to us via email, phone, or MyChart with any questions, concerns, or feedback.

## 2024-01-30 NOTE — PROCEDURES
Carpal Tunnel    Date/Time: 1/30/2024 8:15 AM    Performed by: Yrn Richards MD  Authorized by: Yrn Richards MD    Consent Done?:  Yes (Verbal)  Site marked: the procedure site was marked    Timeout: prior to procedure the correct patient, procedure, and site was verified    Prep: patient was prepped and draped in usual sterile fashion      Local anesthesia used?: Yes    Local anesthetic:  Topical anesthetic  Location:  Wrist (left carpal tunnel)  Ultrasonic Guidance for Needle Placement?: No    Needle size:  25 G  Approach:  Volar  Medications:  4 mg dexAMETHasone 4 mg/mL  Patient tolerance:  Patient tolerated the procedure well with no immediate complications

## 2024-05-02 ENCOUNTER — HOSPITAL ENCOUNTER (OUTPATIENT)
Dept: RADIOLOGY | Facility: HOSPITAL | Age: 39
Discharge: HOME | End: 2024-05-02
Attending: INTERNAL MEDICINE
Payer: COMMERCIAL

## 2024-05-02 DIAGNOSIS — Z12.31 ENCOUNTER FOR SCREENING MAMMOGRAM FOR BREAST CANCER: ICD-10-CM

## 2024-05-02 PROCEDURE — 77067 SCR MAMMO BI INCL CAD: CPT | Mod: 26,,, | Performed by: RADIOLOGY

## 2024-05-02 PROCEDURE — 77067 SCR MAMMO BI INCL CAD: CPT | Mod: TC

## 2024-05-02 PROCEDURE — 77063 BREAST TOMOSYNTHESIS BI: CPT | Mod: 26,,, | Performed by: RADIOLOGY

## 2024-05-16 DIAGNOSIS — I10 PRIMARY HYPERTENSION: ICD-10-CM

## 2024-06-10 ENCOUNTER — PATIENT MESSAGE (OUTPATIENT)
Dept: INTERNAL MEDICINE | Facility: CLINIC | Age: 39
End: 2024-06-10
Payer: COMMERCIAL

## 2024-06-19 ENCOUNTER — PATIENT MESSAGE (OUTPATIENT)
Dept: NEUROLOGY | Facility: CLINIC | Age: 39
End: 2024-06-19
Payer: COMMERCIAL

## 2024-07-26 ENCOUNTER — OFFICE VISIT (OUTPATIENT)
Dept: INTERNAL MEDICINE | Facility: CLINIC | Age: 39
End: 2024-07-26
Payer: COMMERCIAL

## 2024-07-26 VITALS
DIASTOLIC BLOOD PRESSURE: 84 MMHG | OXYGEN SATURATION: 99 % | HEIGHT: 66 IN | HEART RATE: 98 BPM | WEIGHT: 240.75 LBS | SYSTOLIC BLOOD PRESSURE: 136 MMHG | BODY MASS INDEX: 38.69 KG/M2

## 2024-07-26 DIAGNOSIS — U07.1 COVID-19: Primary | ICD-10-CM

## 2024-07-26 LAB
CTP QC/QA: YES
CTP QC/QA: YES
POC MOLECULAR INFLUENZA A AGN: NEGATIVE
POC MOLECULAR INFLUENZA B AGN: NEGATIVE
SARS-COV-2 RDRP RESP QL NAA+PROBE: POSITIVE

## 2024-07-26 PROCEDURE — 99999 PR PBB SHADOW E&M-EST. PATIENT-LVL IV: CPT | Mod: PBBFAC,,, | Performed by: PHYSICIAN ASSISTANT

## 2024-07-26 RX ORDER — FLUTICASONE PROPIONATE 50 MCG
1 SPRAY, SUSPENSION (ML) NASAL DAILY
Qty: 16 G | Refills: 2 | Status: CANCELLED | OUTPATIENT
Start: 2024-07-26

## 2024-07-26 RX ORDER — BENZONATATE 100 MG/1
100 CAPSULE ORAL 3 TIMES DAILY PRN
Qty: 20 CAPSULE | Refills: 0 | Status: SHIPPED | OUTPATIENT
Start: 2024-07-26 | End: 2024-08-05

## 2024-07-26 RX ORDER — BENZONATATE 100 MG/1
100 CAPSULE ORAL 3 TIMES DAILY PRN
Qty: 20 CAPSULE | Refills: 0 | Status: CANCELLED | OUTPATIENT
Start: 2024-07-26 | End: 2024-08-05

## 2024-07-26 RX ORDER — FLUTICASONE PROPIONATE 50 MCG
1 SPRAY, SUSPENSION (ML) NASAL DAILY
Qty: 16 G | Refills: 2 | Status: SHIPPED | OUTPATIENT
Start: 2024-07-26

## 2024-07-26 NOTE — LETTER
July 26, 2024    Kaylin Vargas  [address] [facility] [address]         [facility] [facility]  Internal Medicine  [address]  [facility] [address]  Phone: [phone]  Fax: [phone]   July 26, 2024     Patient: Kaylin Vargas   YOB: 1985   Date of Visit: 7/26/2024       To Whom it May Concern:    Kaylin Vargas was seen in my clinic on 7/26/2024. She may return to work on 07/29/2024 .    Please excuse her from any work missed.    If you have any questions or concerns, please don't hesitate to call.    Sincerely,           Marylin Velásquez PA-C

## 2024-07-26 NOTE — PROGRESS NOTES
"Subjective     Patient ID: Kaylin Vargas is a 39 y.o. female.    Chief Complaint: Cough    HPI    Established pt of Jona Conley Jr., MD (new to me)    Same day/urgent care appt    Here with concerns of dry cough, body ache(back), nasal congestion, slight ha  Onset about 4 days ago  Pt has tried tylenol, ibuprofen, corcidine  Home COVID test negative    Past Medical History:   Diagnosis Date    Acne     Anxiety disorder, unspecified 04/30/2020    Gastro-esophageal reflux disease without esophagitis 04/30/2020    Hypertension     Obesity      Social History     Tobacco Use    Smoking status: Never    Smokeless tobacco: Never   Substance Use Topics    Alcohol use: Yes     Alcohol/week: 2.0 standard drinks of alcohol     Types: 2 Glasses of wine per week     Comment: social    Drug use: Never     Review of patient's allergies indicates:  No Known Allergies    Review of Systems   Constitutional:  Negative for chills, diaphoresis and fever.   HENT:  Positive for nasal congestion.    Respiratory:  Positive for cough. Negative for shortness of breath and wheezing.    Cardiovascular:  Negative for chest pain and leg swelling.   Gastrointestinal:  Negative for nausea and vomiting.   Musculoskeletal:  Positive for back pain.   Integumentary:  Negative for rash.   Neurological:  Positive for headaches.          Objective  /84 (BP Location: Right arm, Patient Position: Sitting, BP Method: Large (Manual))   Pulse 98   Ht 5' 6" (1.676 m)   Wt 109.2 kg (240 lb 11.9 oz)   SpO2 99%   BMI 38.86 kg/m²       Physical Exam  Vitals reviewed.   Constitutional:       General: She is not in acute distress.     Appearance: She is well-developed. She is not ill-appearing.   HENT:      Head: Normocephalic and atraumatic.      Right Ear: Tympanic membrane, ear canal and external ear normal.      Left Ear: Tympanic membrane, ear canal and external ear normal.      Nose: Congestion present.      Right Sinus: Maxillary sinus " tenderness present. No frontal sinus tenderness.      Left Sinus: Maxillary sinus tenderness present. No frontal sinus tenderness.      Mouth/Throat:      Mouth: Mucous membranes are moist.      Pharynx: Oropharynx is clear. No oropharyngeal exudate.   Cardiovascular:      Rate and Rhythm: Normal rate and regular rhythm.      Heart sounds: No murmur heard.  Pulmonary:      Effort: Pulmonary effort is normal.      Breath sounds: Normal breath sounds. No wheezing or rales.   Musculoskeletal:      Right lower leg: No edema.      Left lower leg: No edema.   Skin:     General: Skin is warm and dry.      Findings: No rash.   Neurological:      Mental Status: She is alert.   Psychiatric:         Mood and Affect: Mood normal.            Assessment and Plan     1. COVID-19  -     fluticasone propionate (FLONASE) 50 mcg/actuation nasal spray; 1 spray (50 mcg total) by Each Nostril route once daily.  Dispense: 16 g; Refill: 2  -     benzonatate (TESSALON) 100 MG capsule; Take 1 capsule (100 mg total) by mouth 3 (three) times daily as needed for Cough.  Dispense: 20 capsule; Refill: 0  -     POCT COVID-19 Rapid Screening  -     POCT Influenza A/B Molecular      POC COVID positive  POC flu negative  Symptomatic care and masking guidelines reviewed  RTC prn    Marylin Velásquez PA-C

## 2024-08-09 ENCOUNTER — PATIENT MESSAGE (OUTPATIENT)
Dept: OBSTETRICS AND GYNECOLOGY | Facility: CLINIC | Age: 39
End: 2024-08-09
Payer: COMMERCIAL

## 2024-08-09 ENCOUNTER — OFFICE VISIT (OUTPATIENT)
Dept: OBSTETRICS AND GYNECOLOGY | Facility: CLINIC | Age: 39
End: 2024-08-09
Payer: COMMERCIAL

## 2024-08-09 VITALS
WEIGHT: 235.88 LBS | BODY MASS INDEX: 38.07 KG/M2 | DIASTOLIC BLOOD PRESSURE: 81 MMHG | SYSTOLIC BLOOD PRESSURE: 124 MMHG

## 2024-08-09 DIAGNOSIS — N94.6 PAINFUL MENSTRUAL PERIODS: Primary | ICD-10-CM

## 2024-08-09 DIAGNOSIS — N97.9 FEMALE INFERTILITY: ICD-10-CM

## 2024-08-09 PROCEDURE — 99213 OFFICE O/P EST LOW 20 MIN: CPT | Mod: S$GLB,,,

## 2024-08-09 PROCEDURE — 3074F SYST BP LT 130 MM HG: CPT | Mod: CPTII,S$GLB,,

## 2024-08-09 PROCEDURE — 1159F MED LIST DOCD IN RCRD: CPT | Mod: CPTII,S$GLB,,

## 2024-08-09 PROCEDURE — 99999 PR PBB SHADOW E&M-EST. PATIENT-LVL III: CPT | Mod: PBBFAC,,,

## 2024-08-09 PROCEDURE — 3008F BODY MASS INDEX DOCD: CPT | Mod: CPTII,S$GLB,,

## 2024-08-09 PROCEDURE — 3079F DIAST BP 80-89 MM HG: CPT | Mod: CPTII,S$GLB,,

## 2024-08-09 NOTE — PROGRESS NOTES
Chief Complaint: Painful Periods     HPI:      Kaylin Vargas is a 39 y.o.  who presents today to discuss painful periods and fertility.  She is new to me.  Periods have become more painful in the last 6 months.  Reports for her last period she had to take off work because the pain was so bad.  Hasn't tried tylenol or NSAIDs.  Denies heavy bleeding.  Periods are monthly and denies skipping months.  Patient's last menstrual period was 2024.   is currently sexually active with a single male partner. She is currently using no method for contraception. Has been actively trying to conceive for the past four years.  Using dee dee to follow periods and predict ovulation. See has not done ovulation testing strips.  Hx of 2 prior term vaginal deliveries over 18 years ago.  Partner has two children by another women, over 26 years ago.  The patient does have a hx of chlamydia between having her first and second child. Has seen obgyn in the past for infertility and workup was started but she never followed up.  Partner was supposed to have SA performed but never followed up for that. Previously AMH 1.2 ng/mL (2022).  FSH, estradiol, and progesterone normal.  Has not had a 21 day progesterone drawn.  Pelvic ultrasound done 10/2022, see below.   PMhx includes HTN controlled on meds.    EXAMINATION:  US PELVIS COMP WITH TRANSVAG NON-OB (XPD)     CLINICAL HISTORY:  female infertility; Female infertility, unspecified     TECHNIQUE:  Transabdominal sonography of the pelvis was performed, followed by transvaginal sonography to better evaluate the uterus and ovaries.     COMPARISON:  None.     FINDINGS:  Technically difficult examination secondary to decreased sonographic penetration related to patient body habitus.     Uterus:     Size: 11.0 x 6.2 x 6.1 cm     Masses: Heterogeneous myometrial focus likely representing a fibroid measuring 3.1 x 2.5 x 2.9 cm.     Endometrium: Normal in this pre menopausal  patient, measuring 9 mm.     Multiple cervical nabothian cysts.  Trace cervical free fluid, likely physiologic.     Right ovary:     Not visualized.     Left ovary:     Size: 2.8 x 2.5 x 3.6 cm     Appearance: Simple appearing follicle measuring 1.9 cm.     Vascular Flow: Arterial flow present.  Unable to identified venous flow however this may be secondary to poor penetration.     Free Fluid:     None.     Impression:     1. Heterogeneous myometrial focus likely representing a fibroid.  2. Nonvisualization of the right ovary.  Unable to identify venous flow within the left ovary.  Findings may relate to limited sonographic penetration related to patient body habitus.  3. Multiple cervical nabothian cysts.    Physical Exam:      PHYSICAL EXAM:  /81 (BP Location: Left arm, Patient Position: Sitting, BP Method: Medium (Automatic))   Wt 107 kg (235 lb 14.3 oz)   LMP 07/07/2024   BMI 38.07 kg/m²   Body mass index is 38.07 kg/m².     APPEARANCE: Well nourished, well developed, in no acute distress.  PELVIC:  deferred    Assessment/Plan:     Painful menstrual periods  -     Discussed OCP's are first line treatment however pt trying to conceive at this time. Will trial painful period cocktail and ibuprofen and see if that helps.  -     Discussed painful period cocktail:  Omega 3 2000 mg daily (every day)  Magnesium Glycinate 200 mg daily  Marcia 1 gram BID x 2 days prior to menses and during cycle  -     Additionally:  Ibuprofen 400 mg BID x 2 days prior to menses and during cycle    Female infertility  -     Unknown infertility at this time.  Discussed given patients age that establishing care with fertility clinic may be in her best interested.  -     Will start with AMA and 3 day labs and plan for 21 day progesterone.  -     Recommended partner have SA done - order form given to patient - to r/o male factor.  -     May consider repeat TVUS.  -     If not ovulating can also consider clomid or letrozole.   -      Patient was counseled today on healthy diet and exercise when trying to conceive and during pregnancy.  -     Recommended initiation of prenatal vitamins with folic acid.  -     Prenatal Vitamin, Vitamin D3 2000 IU daily, Magnesium glycinate 250-500 mg nightly.  -     Pre mom ovulation test kit.  Discussed testing cycle day #10-20.  -     Optimal timing of intercourse reviewed.   -     ANTIMULLERIAN HORMONE (AMH); Future; Expected date: 08/12/2024  -     Follicle Stimulating Hormone; Future; Expected date: 08/12/2024  -     Estradiol; Future  -     Progesterone; Future  -     Prolactin; Future  -     LUTEINIZING HORMONE; Future; Expected date: 08/12/2024    Follow up pending lab results.    Rita Leslie (Maggie), LEYLA  Obstetrics and Gynecology  [facility]

## 2024-08-13 ENCOUNTER — LAB VISIT (OUTPATIENT)
Dept: LAB | Facility: HOSPITAL | Age: 39
End: 2024-08-13
Payer: COMMERCIAL

## 2024-08-13 DIAGNOSIS — N97.9 FEMALE INFERTILITY: ICD-10-CM

## 2024-08-13 LAB
ESTRADIOL SERPL-MCNC: 25 PG/ML
FSH SERPL-ACNC: 5.38 MIU/ML
LH SERPL-ACNC: 1.6 MIU/ML
PROGEST SERPL-MCNC: 0.2 NG/ML
PROLACTIN SERPL IA-MCNC: 12.1 NG/ML (ref 5.2–26.5)

## 2024-08-13 PROCEDURE — 84146 ASSAY OF PROLACTIN: CPT

## 2024-08-13 PROCEDURE — 84144 ASSAY OF PROGESTERONE: CPT

## 2024-08-13 PROCEDURE — 83001 ASSAY OF GONADOTROPIN (FSH): CPT

## 2024-08-13 PROCEDURE — 36415 COLL VENOUS BLD VENIPUNCTURE: CPT

## 2024-08-13 PROCEDURE — 83002 ASSAY OF GONADOTROPIN (LH): CPT

## 2024-08-13 PROCEDURE — 82670 ASSAY OF TOTAL ESTRADIOL: CPT

## 2024-08-13 PROCEDURE — 82166 ASSAY ANTI-MULLERIAN HORM: CPT

## 2024-08-14 LAB — MIS SERPL-MCNC: 1.1 NG/ML (ref 0.03–5.5)

## 2024-08-16 ENCOUNTER — PATIENT MESSAGE (OUTPATIENT)
Dept: OBSTETRICS AND GYNECOLOGY | Facility: CLINIC | Age: 39
End: 2024-08-16
Payer: COMMERCIAL

## 2024-08-16 DIAGNOSIS — N97.9 FEMALE INFERTILITY: Primary | ICD-10-CM

## 2024-08-30 ENCOUNTER — LAB VISIT (OUTPATIENT)
Dept: LAB | Facility: HOSPITAL | Age: 39
End: 2024-08-30
Payer: COMMERCIAL

## 2024-08-30 DIAGNOSIS — N97.9 FEMALE INFERTILITY: ICD-10-CM

## 2024-08-30 LAB — PROGEST SERPL-MCNC: 1.2 NG/ML

## 2024-08-30 PROCEDURE — 36415 COLL VENOUS BLD VENIPUNCTURE: CPT

## 2024-08-30 PROCEDURE — 84144 ASSAY OF PROGESTERONE: CPT

## 2024-09-04 ENCOUNTER — PATIENT MESSAGE (OUTPATIENT)
Dept: OBSTETRICS AND GYNECOLOGY | Facility: CLINIC | Age: 39
End: 2024-09-04
Payer: COMMERCIAL

## 2025-02-13 DIAGNOSIS — R42 DIZZINESS OF UNKNOWN CAUSE: ICD-10-CM

## 2025-02-13 RX ORDER — MECLIZINE HCL 12.5 MG 12.5 MG/1
12.5 TABLET ORAL 3 TIMES DAILY PRN
Qty: 30 TABLET | Refills: 0 | Status: SHIPPED | OUTPATIENT
Start: 2025-02-13

## 2025-02-13 NOTE — TELEPHONE ENCOUNTER
Refill Routing Note   Medication(s) are not appropriate for processing by [facility] for the following reason(s):        Outside of protocol    [facility] action(s):  Route   Requires appointment : Yes     Requires labs : Yes             Appointments  past 12m or future 3m with PCP    Date Provider   Last Visit   8/10/2023 Jona Conley Jr., MD   Next Visit   Visit date not found Jona Conley Jr., MD   ED visits in past 90 days: 1        Note composed:9:07 AM 02/13/2025

## 2025-02-13 NOTE — TELEPHONE ENCOUNTER
Care Due:                  Date            Visit Type   Department     Provider  --------------------------------------------------------------------------------                                MYCHART                              FOLLOWUP/OF  [facility] INTERNAL  Last Visit: 08-      FICE VISIT   MEDICINE       Jona Conley  Next Visit: None Scheduled  None         None Found                                                            Last  Test          Frequency    Reason                     Performed    Due Date  --------------------------------------------------------------------------------    Office Visit  15 months..  valsartan-hydrochlorothia  08-   11-                             shajide.....................    CMP.........  12 months..  valsartan-hydrochlorothia  04- 04-                             ha.....................    [employer] Embedded Care Due Messages. Reference number: [id number].   2/13/2025 5:43:34 AM CST

## 2025-02-25 ENCOUNTER — CLINICAL SUPPORT (OUTPATIENT)
Dept: OTHER | Facility: CLINIC | Age: 40
End: 2025-02-25

## 2025-02-25 DIAGNOSIS — Z00.8 ENCOUNTER FOR OTHER GENERAL EXAMINATION: ICD-10-CM

## 2025-02-26 VITALS
BODY MASS INDEX: 39.99 KG/M2 | WEIGHT: 240 LBS | SYSTOLIC BLOOD PRESSURE: 138 MMHG | HEIGHT: 65 IN | DIASTOLIC BLOOD PRESSURE: 100 MMHG

## 2025-02-26 LAB
GLUCOSE SERPL-MCNC: 111 MG/DL (ref 60–140)
HDLC SERPL-MCNC: 51 MG/DL
POC CHOLESTEROL, LDL (DOCK): 86 MG/DL
POC CHOLESTEROL, TOTAL: 164 MG/DL
TRIGL SERPL-MCNC: 158 MG/DL

## 2025-02-28 RX ORDER — VALSARTAN AND HYDROCHLOROTHIAZIDE 80; 12.5 MG/1; MG/1
1 TABLET, FILM COATED ORAL DAILY
Qty: 90 TABLET | Refills: 0 | Status: SHIPPED | OUTPATIENT
Start: 2025-02-28

## 2025-02-28 NOTE — TELEPHONE ENCOUNTER
No care due was identified.  [facility] Embedded Care Due Messages. Reference number: [id number].   2/28/2025 11:14:05 AM CST

## 2025-03-24 ENCOUNTER — OFFICE VISIT (OUTPATIENT)
Dept: OBSTETRICS AND GYNECOLOGY | Facility: CLINIC | Age: 40
End: 2025-03-24
Payer: COMMERCIAL

## 2025-03-24 VITALS — SYSTOLIC BLOOD PRESSURE: 145 MMHG | BODY MASS INDEX: 40.69 KG/M2 | DIASTOLIC BLOOD PRESSURE: 99 MMHG | WEIGHT: 244.5 LBS

## 2025-03-24 DIAGNOSIS — N97.0 INFERTILITY ASSOCIATED WITH ANOVULATION: Primary | ICD-10-CM

## 2025-03-24 PROCEDURE — 99999 PR PBB SHADOW E&M-EST. PATIENT-LVL III: CPT | Mod: PBBFAC,,, | Performed by: STUDENT IN AN ORGANIZED HEALTH CARE EDUCATION/TRAINING PROGRAM

## 2025-03-24 PROCEDURE — 3008F BODY MASS INDEX DOCD: CPT | Mod: CPTII,S$GLB,, | Performed by: STUDENT IN AN ORGANIZED HEALTH CARE EDUCATION/TRAINING PROGRAM

## 2025-03-24 PROCEDURE — 3080F DIAST BP >= 90 MM HG: CPT | Mod: CPTII,S$GLB,, | Performed by: STUDENT IN AN ORGANIZED HEALTH CARE EDUCATION/TRAINING PROGRAM

## 2025-03-24 PROCEDURE — 99212 OFFICE O/P EST SF 10 MIN: CPT | Mod: S$GLB,,, | Performed by: STUDENT IN AN ORGANIZED HEALTH CARE EDUCATION/TRAINING PROGRAM

## 2025-03-24 PROCEDURE — 1159F MED LIST DOCD IN RCRD: CPT | Mod: CPTII,S$GLB,, | Performed by: STUDENT IN AN ORGANIZED HEALTH CARE EDUCATION/TRAINING PROGRAM

## 2025-03-24 PROCEDURE — 3077F SYST BP >= 140 MM HG: CPT | Mod: CPTII,S$GLB,, | Performed by: STUDENT IN AN ORGANIZED HEALTH CARE EDUCATION/TRAINING PROGRAM

## 2025-03-24 NOTE — PROGRESS NOTES
Gynecology    SUBJECTIVE:     Chief Complaint: Personal Problem (Conceiving)       History of Present Illness:  Kaylin Vargas is a  here today for infertility work-up. She was last seen for this problem 2024. Lab testing at that time was notable fore AMH of 1.1 and a negative 21-day progesterone. She reports that she has continued to use at-home OPKs since August and has not gotten a positive result.  She and her partner still desire fertility and wish to have one more child.    Review of Systems:  Review of Systems   Constitutional:  Negative for chills and fever.   Respiratory:  Negative for shortness of breath.    Cardiovascular:  Negative for chest pain.   Gastrointestinal:  Negative for abdominal pain, nausea and vomiting.   Endocrine: Negative for hot flashes.   Genitourinary:  Negative for menstrual problem.   Integumentary:  Negative for breast mass, nipple discharge and breast skin changes.   Neurological:  Negative for headaches.   Hematological:  Does not bruise/bleed easily.   Psychiatric/Behavioral:  Negative for depression.    Breast: Negative for mass, mastodynia, nipple discharge and skin changes       OBJECTIVE:     Physical Exam:  Physical Exam  Constitutional:       Appearance: Normal appearance.   HENT:      Head: Normocephalic and atraumatic.   Eyes:      Conjunctiva/sclera: Conjunctivae normal.      Pupils: Pupils are equal, round, and reactive to light.   Cardiovascular:      Rate and Rhythm: Normal rate and regular rhythm.   Pulmonary:      Effort: Pulmonary effort is normal. No respiratory distress.   Abdominal:      General: Abdomen is flat.      Palpations: Abdomen is soft.   Musculoskeletal:         General: Normal range of motion.      Cervical back: Normal range of motion.   Neurological:      Mental Status: She is alert.   Psychiatric:         Mood and Affect: Mood normal.         Thought Content: Thought content normal.           ASSESSMENT:       ICD-10-CM  ICD-9-CM    1. Infertility associated with anovulation  N97.0 628.0              Plan:      Kaylin was seen today for personal problem.    Diagnoses and all orders for this visit:    Infertility associated with anovulation  - Reviewed past lab results and negative OPK results with patient and her partner  - Discussed options for management including ovulation induction or referral to fertility specialist for IUI vs IVF  - Briefly counseled patient on differences between IUI and IVF including cost, risk of multiples, and ability to perform pre-implantation genetic testing  - Counseled patient that due to her age, strong desire to only have one more child and avoid multiples, and benefit of possible insurance coverage, I would recommend proceeding with [facility] referral and discussing IVF  - Patient given information on [facility] and [facility]. She understands to contact their clinic to make appointments  - Discussed importance of HTN control with pregnancy-safe medications      Taye Verma

## 2025-03-27 ENCOUNTER — OFFICE VISIT (OUTPATIENT)
Dept: INTERNAL MEDICINE | Facility: CLINIC | Age: 40
End: 2025-03-27
Payer: COMMERCIAL

## 2025-03-27 VITALS
SYSTOLIC BLOOD PRESSURE: 118 MMHG | BODY MASS INDEX: 41.02 KG/M2 | HEART RATE: 87 BPM | DIASTOLIC BLOOD PRESSURE: 82 MMHG | WEIGHT: 246.5 LBS | OXYGEN SATURATION: 97 %

## 2025-03-27 DIAGNOSIS — I10 PRIMARY HYPERTENSION: ICD-10-CM

## 2025-03-27 DIAGNOSIS — N97.0 INFERTILITY ASSOCIATED WITH ANOVULATION: Primary | ICD-10-CM

## 2025-03-27 DIAGNOSIS — R23.2 HOT FLASHES: ICD-10-CM

## 2025-03-27 PROCEDURE — 3008F BODY MASS INDEX DOCD: CPT | Mod: CPTII,S$GLB,, | Performed by: NURSE PRACTITIONER

## 2025-03-27 PROCEDURE — 3079F DIAST BP 80-89 MM HG: CPT | Mod: CPTII,S$GLB,, | Performed by: NURSE PRACTITIONER

## 2025-03-27 PROCEDURE — 3074F SYST BP LT 130 MM HG: CPT | Mod: CPTII,S$GLB,, | Performed by: NURSE PRACTITIONER

## 2025-03-27 PROCEDURE — 1159F MED LIST DOCD IN RCRD: CPT | Mod: CPTII,S$GLB,, | Performed by: NURSE PRACTITIONER

## 2025-03-27 PROCEDURE — 99215 OFFICE O/P EST HI 40 MIN: CPT | Mod: S$GLB,,, | Performed by: NURSE PRACTITIONER

## 2025-03-27 PROCEDURE — 99999 PR PBB SHADOW E&M-EST. PATIENT-LVL IV: CPT | Mod: PBBFAC,,, | Performed by: NURSE PRACTITIONER

## 2025-03-27 NOTE — PROGRESS NOTES
Subjective     Patient ID: Kaylin Vargas is a 39 y.o. female.  /82 (BP Location: Right arm, Patient Position: Sitting)   Pulse 87   Wt 111.8 kg (246 lb 7.6 oz)   LMP 2025   SpO2 97%   BMI 41.02 kg/m²      Chief Complaint: Hypertension    Kaylin Vargas is a 39 y.o. AAF  who presents with complaint of hot flashes. Currently undergoing work up for infertility at [facility], but has not started IFV or IUI. Pertinent positives include, AAF, BMI of 41.02, difficulty losing weight, hirsutism, alopecia of scalp, and acne. Reviewed infertility workup; has not had A1C or TSH checked. Last seen by GYN 24.           Problem List[1]     Current Medications[2]     Review of Systems   Respiratory:  Negative for shortness of breath.    Cardiovascular:  Negative for chest pain and palpitations.   Genitourinary:  Positive for hot flashes and menstrual problem.   Musculoskeletal:  Negative for neck pain.   Integumentary:         Hair loss to scalp, acne    Neurological:  Negative for headaches.          Objective     Physical Exam  Constitutional:       General: She is not in acute distress.     Appearance: Normal appearance. She is obese. She is not ill-appearing, toxic-appearing or diaphoretic.   HENT:      Head: Normocephalic.   Cardiovascular:      Rate and Rhythm: Normal rate.   Pulmonary:      Effort: Pulmonary effort is normal.   Skin:     General: Skin is warm and dry.      Comments: Facial hair, acne   Neurological:      Mental Status: She is alert and oriented to person, place, and time.   Psychiatric:         Mood and Affect: Mood normal.         Behavior: Behavior normal.          Assessment and Plan     1. Infertility associated with anovulation; High suspicion for PCOS as cause of infertility, will obtain A1C, in addition to initial infertility work up. Will also include TSH and lipid panel. Referral placed to GYN for further assistance and expertise. She is an [employer]  employee and is eligible for [employer] insurance to assist with IFV and IUI.   -     TSH; Future; Expected date: 03/27/2025  -     HEMOGLOBIN A1C; Future; Expected date: 03/27/2025  -     Comprehensive Metabolic Panel; Future; Expected date: 03/27/2025  -     Antimullerian Hormone (AMH); Future; Expected date: 03/27/2025  -     Follicle Stimulating Hormone; Future; Expected date: 03/27/2025  -     Estradiol; Future; Expected date: 03/27/2025  -     Prolactin; Future; Expected date: 03/27/2025  -     Luteinizing Hormone; Future; Expected date: 03/27/2025  -     Progesterone; Future; Expected date: 03/27/2025  -     Testosterone; Future; Expected date: 03/27/2025  -     LIPID PANEL; Future; Expected date: 03/27/2025  -     Ambulatory referral/consult to Obstetrics / Gynecology; Future; Expected date: 04/03/2025    2. Primary hypertension; at goal, on diovan-hct 80/12.5mg. States her BP's have been as high as 150/80-90. /82 in clinic today. Recently enrolled in digital med HTN program. Will have patient follow up on BP during annual with PCP on 5/30/25.     3. Hot flashes; likely related to hormone imbalance. Will provide work up and follow up with results when available. Gyn referral placed for further evaluation and expertise.   -     TSH; Future; Expected date: 03/27/2025  -     HEMOGLOBIN A1C; Future; Expected date: 03/27/2025  -     Comprehensive Metabolic Panel; Future; Expected date: 03/27/2025  -     Antimullerian Hormone (AMH); Future; Expected date: 03/27/2025  -     Follicle Stimulating Hormone; Future; Expected date: 03/27/2025  -     Estradiol; Future; Expected date: 03/27/2025  -     Prolactin; Future; Expected date: 03/27/2025  -     Luteinizing Hormone; Future; Expected date: 03/27/2025  -     Progesterone; Future; Expected date: 03/27/2025  -     Testosterone; Future; Expected date: 03/27/2025  -     LIPID PANEL; Future; Expected date: 03/27/2025  -     Ambulatory referral/consult to Obstetrics /  Gynecology; Future; Expected date: 04/03/2025      *discussed that all labs are to be drawn between 8am-10am.       I spent a total of 40 minutes on the day of the visit.  This includes face to face time and non-face to face time preparing to see the patient (eg, review of tests), obtaining and/or reviewing separately obtained history, documenting clinical information in the electronic or other health record, independently interpreting results and communicating results to the patient/family/caregiver, or care coordinator.     Jeff Dunlap NP   Internal Medicine           Follow up in about 4 weeks (around 4/24/2025) for Annual with Dr Conley.         [1]   Patient Active Problem List  Diagnosis    Obesity (BMI 35.0-39.9 without comorbidity)    BMI 38.0-38.9,adult    Suspected sleep apnea    Gastro-esophageal reflux disease without esophagitis    Anxiety disorder, unspecified    Allergic rhinitis, unspecified    Left foot pain    Contusion of left foot    Primary hypertension    Dizziness    Severe obesity (BMI 35.0-39.9) with comorbidity   [2]   Current Outpatient Medications   Medication Sig Dispense Refill    fluticasone propionate (FLONASE) 50 mcg/actuation nasal spray 1 spray (50 mcg total) by Each Nostril route once daily. 16 g 2    GINGER ROOT-PYRIDOXINE HCL,B6, ORAL Take by mouth.      meclizine (ANTIVERT) 12.5 mg tablet Take 1 tablet (12.5 mg total) by mouth 3 (three) times daily as needed for Dizziness. 30 tablet 0    omeprazole (PRILOSEC) 40 MG capsule Take 1 capsule (40 mg total) by mouth once daily. 30 capsule 11    valsartan-hydrochlorothiazide (DIOVAN-HCT) 80-12.5 mg per tablet Take 1 tablet by mouth once daily. 90 tablet 0     No current facility-administered medications for this visit.

## 2025-03-28 ENCOUNTER — LAB VISIT (OUTPATIENT)
Dept: LAB | Facility: HOSPITAL | Age: 40
End: 2025-03-28
Attending: NURSE PRACTITIONER
Payer: COMMERCIAL

## 2025-03-28 DIAGNOSIS — N97.0 INFERTILITY ASSOCIATED WITH ANOVULATION: ICD-10-CM

## 2025-03-28 DIAGNOSIS — R23.2 HOT FLASHES: ICD-10-CM

## 2025-03-28 LAB
ALBUMIN SERPL BCP-MCNC: 3.4 G/DL (ref 3.5–5.2)
ALP SERPL-CCNC: 55 UNIT/L (ref 40–150)
ALT SERPL W/O P-5'-P-CCNC: 12 UNIT/L (ref 10–44)
ANION GAP (OHS): 7 MMOL/L (ref 8–16)
AST SERPL-CCNC: 14 UNIT/L (ref 11–45)
BILIRUB SERPL-MCNC: 0.4 MG/DL (ref 0.1–1)
BUN SERPL-MCNC: 15 MG/DL (ref 6–20)
CALCIUM SERPL-MCNC: 8.5 MG/DL (ref 8.7–10.5)
CHLORIDE SERPL-SCNC: 106 MMOL/L (ref 95–110)
CHOLEST SERPL-MCNC: 151 MG/DL (ref 120–199)
CHOLEST/HDLC SERPL: 3.3 {RATIO} (ref 2–5)
CO2 SERPL-SCNC: 23 MMOL/L (ref 23–29)
CREAT SERPL-MCNC: 0.8 MG/DL (ref 0.5–1.4)
EAG (OHS): 117 MG/DL (ref 68–131)
ESTRADIOL SERPL HS-MCNC: 115 PG/ML
FSH SERPL-ACNC: 3.41 MIU/ML
GFR SERPLBLD CREATININE-BSD FMLA CKD-EPI: >60 ML/MIN/1.73/M2
GLUCOSE SERPL-MCNC: 92 MG/DL (ref 70–110)
HBA1C MFR BLD: 5.7 % (ref 4–5.6)
HDLC SERPL-MCNC: 46 MG/DL (ref 40–75)
HDLC SERPL: 30.5 % (ref 20–50)
LDLC SERPL CALC-MCNC: 82.6 MG/DL (ref 63–159)
LH SERPL-ACNC: 4.2 MIU/ML
NONHDLC SERPL-MCNC: 105 MG/DL
POTASSIUM SERPL-SCNC: 3.9 MMOL/L (ref 3.5–5.1)
PROGEST SERPL-MCNC: 2.9 NG/ML
PROLACTIN SERPL IA-MCNC: 16.6 NG/ML (ref 5.2–26.5)
PROT SERPL-MCNC: 7.7 GM/DL (ref 6–8.4)
SODIUM SERPL-SCNC: 136 MMOL/L (ref 136–145)
TESTOST SERPL-MCNC: 41 NG/DL (ref 5–73)
TRIGL SERPL-MCNC: 112 MG/DL (ref 30–150)
TSH SERPL-ACNC: 1.56 UIU/ML (ref 0.4–4)

## 2025-03-28 PROCEDURE — 84144 ASSAY OF PROGESTERONE: CPT

## 2025-03-28 PROCEDURE — 83001 ASSAY OF GONADOTROPIN (FSH): CPT

## 2025-03-28 PROCEDURE — 82465 ASSAY BLD/SERUM CHOLESTEROL: CPT

## 2025-03-28 PROCEDURE — 83002 ASSAY OF GONADOTROPIN (LH): CPT

## 2025-03-28 PROCEDURE — 82670 ASSAY OF TOTAL ESTRADIOL: CPT

## 2025-03-28 PROCEDURE — 84443 ASSAY THYROID STIM HORMONE: CPT

## 2025-03-28 PROCEDURE — 84403 ASSAY OF TOTAL TESTOSTERONE: CPT

## 2025-03-28 PROCEDURE — 83036 HEMOGLOBIN GLYCOSYLATED A1C: CPT

## 2025-03-28 PROCEDURE — 36415 COLL VENOUS BLD VENIPUNCTURE: CPT

## 2025-03-28 PROCEDURE — 82166 ASSAY ANTI-MULLERIAN HORM: CPT

## 2025-03-28 PROCEDURE — 84146 ASSAY OF PROLACTIN: CPT

## 2025-03-28 PROCEDURE — 84075 ASSAY ALKALINE PHOSPHATASE: CPT

## 2025-03-31 ENCOUNTER — PATIENT MESSAGE (OUTPATIENT)
Dept: OBSTETRICS AND GYNECOLOGY | Facility: CLINIC | Age: 40
End: 2025-03-31
Payer: COMMERCIAL

## 2025-03-31 LAB — MIS SERPL IA-MCNC: 0.95 NG/ML (ref 0.03–5.5)

## 2025-04-01 ENCOUNTER — PATIENT MESSAGE (OUTPATIENT)
Dept: INTERNAL MEDICINE | Facility: CLINIC | Age: 40
End: 2025-04-01
Payer: COMMERCIAL

## 2025-04-15 ENCOUNTER — DOCUMENTATION ONLY (OUTPATIENT)
Dept: BARIATRICS | Facility: CLINIC | Age: 40
End: 2025-04-15
Payer: COMMERCIAL

## 2025-04-15 NOTE — PROGRESS NOTES
Patient on financial work queue list- she is an [employer] employee and was advised per Manju Brumfield,  that insurance requires medical wt loss through digital medicine program.  Removed from work queue list.

## 2025-04-29 NOTE — PROGRESS NOTES
Patient ID: Kaylin Vargas is a 40 y.o. Black or  female    Subjective  Chief Complaint: patient presents for medical weight loss management.    Contraindications to GLP-1 receptor agonist therapy:   Denies personal or family history of MTC and personal history of MEN2     Pregnancy Status:   - Pt denies current pregnancy, breastfeeding, or plans to become pregnant.  - Pt denies current use of oral hormonal contraception.     Co-morbidities: HTN    History of weight loss therapy: Pt denies previous weight management medication use.     Weight loss history:  Starting weight:    4/30/2025   Patient reported    Weight (lbs) 245 lb    BMI 39.5 BMI      Objective  Lab Results   Component Value Date     03/28/2025     04/25/2023     11/29/2022     Lab Results   Component Value Date    K 3.9 03/28/2025    K 4.0 04/25/2023    K 3.7 11/29/2022     Lab Results   Component Value Date     03/28/2025     04/25/2023     11/29/2022     Lab Results   Component Value Date    CO2 23 03/28/2025    CO2 22 (L) 04/25/2023    CO2 22 (L) 11/29/2022     Lab Results   Component Value Date    BUN 15 03/28/2025    BUN 11 04/25/2023    BUN 10 11/29/2022     Lab Results   Component Value Date    GLU 92 03/28/2025    GLU 92 04/25/2023    GLU 81 11/29/2022     Lab Results   Component Value Date    CALCIUM 8.5 (L) 03/28/2025    CALCIUM 9.1 04/25/2023    CALCIUM 9.3 11/29/2022     Lab Results   Component Value Date    PROT 7.7 03/28/2025    PROT 7.9 04/25/2023    PROT 7.3 06/30/2022     Lab Results   Component Value Date    ALBUMIN 3.4 (L) 03/28/2025    ALBUMIN 3.5 04/25/2023    ALBUMIN 3.5 06/30/2022     Lab Results   Component Value Date    BILITOT 0.4 03/28/2025    BILITOT 0.3 04/25/2023    BILITOT 0.4 06/30/2022     Lab Results   Component Value Date    AST 14 03/28/2025    AST 15 04/25/2023    AST 19 06/30/2022     Lab Results   Component Value Date    ALT 12 03/28/2025    ALT 14  04/25/2023    ALT 22 06/30/2022     Lab Results   Component Value Date    ANIONGAP 7 (L) 03/28/2025    ANIONGAP 7 (L) 04/25/2023    ANIONGAP 9 11/29/2022     Lab Results   Component Value Date    CREATININE 0.8 03/28/2025    CREATININE 0.8 04/25/2023    CREATININE 0.9 11/29/2022     Lab Results   Component Value Date    EGFRNORACEVR >60 03/28/2025    EGFRNORACEVR >60.0 04/25/2023    EGFRNORACEVR >60.0 11/29/2022     Assessment/Plan  -Pt qualifies for GLP-1 RA therapy based on BMI greater than or equal to 30 kg/m2  - Initiate Wegovy 0.25 mg once weekly for 1 month  - Then increase to Wegovy 0.5 mg once weekly for 1 month  - Then increase to Wegovy 1 mg once weekly  - RTC in 3 months for follow-up evaluation      Patient consented to pharmacist management via collaborative practice.

## 2025-04-30 ENCOUNTER — PATIENT MESSAGE (OUTPATIENT)
Dept: INTERNAL MEDICINE | Facility: CLINIC | Age: 40
End: 2025-04-30

## 2025-04-30 ENCOUNTER — OFFICE VISIT (OUTPATIENT)
Dept: INTERNAL MEDICINE | Facility: CLINIC | Age: 40
End: 2025-04-30
Payer: COMMERCIAL

## 2025-04-30 DIAGNOSIS — E66.812 OBESITY, CLASS II, BMI 35-39.9, ISOLATED (SEE ACTUAL BMI): Primary | ICD-10-CM

## 2025-04-30 PROCEDURE — 99499 UNLISTED E&M SERVICE: CPT | Mod: 95,,,

## 2025-04-30 RX ORDER — SEMAGLUTIDE 0.5 MG/.5ML
0.5 INJECTION, SOLUTION SUBCUTANEOUS
Qty: 2 ML | Refills: 0 | Status: ACTIVE | OUTPATIENT
Start: 2025-04-30

## 2025-04-30 RX ORDER — SEMAGLUTIDE 0.25 MG/.5ML
0.25 INJECTION, SOLUTION SUBCUTANEOUS
Qty: 2 ML | Refills: 0 | Status: ACTIVE | OUTPATIENT
Start: 2025-04-30

## 2025-04-30 RX ORDER — SEMAGLUTIDE 1 MG/.5ML
1 INJECTION, SOLUTION SUBCUTANEOUS
Qty: 2 ML | Refills: 0 | Status: ACTIVE | OUTPATIENT
Start: 2025-04-30

## 2025-05-06 ENCOUNTER — CLINICAL SUPPORT (OUTPATIENT)
Dept: OBSTETRICS AND GYNECOLOGY | Facility: CLINIC | Age: 40
End: 2025-05-06
Payer: COMMERCIAL

## 2025-05-06 DIAGNOSIS — N95.1 MENOPAUSAL SYMPTOMS: Primary | ICD-10-CM

## 2025-05-08 NOTE — PROGRESS NOTES
Personal Information    Full Name: Kaylin Vargas  1985    PCP- Dr. Jona Conley      Medical History    Do you have a chronic medical condition? (e.g., diabetes, hypertension, thyroid issues)   If yes, please specify:   Yes - HTN    2.   Do you have a history of hormone- related conditions? (e.g., endometriosis, breast cancer)   If yes, please explain:   No    3.   Have you previously or are you currently taking hormone replacement therapy?   If yes, please list:   No    Menstrual History    At what age did you begin menstruating?   9    2.   Have you experienced any changes in your menstrual cycle in the last year?   If yes, please describe:   No    3.   When was your last menstrual period or age of last period?   5/5/2025    4.   Have you had a hysterectomy?   If yes, at what age?  No    Symptoms Assesments      Are you experiencing any of the following symptoms:  Hot Flashes: Yes   Night Sweats: No   Vaginal Dryness or Pain with Gerster: No   Mood Swings: Yes   Anxiety or Depression: Yes   Sleep Disturbances: Yes   Fatigue: Yes   Weight Gain: Yes   Joint or Muscle Pain: No   Memory Issues or Brain Fog: Yes   Decreased Interest in Sex (Low Libido): Yes     Lifestyle Factors    How would you describe your diet?  Balanced    2.   How often do you exercise?   Occasionally    3.   Do you smoke or consume alcohol?   If yes, please specify frequency:   Yes - Social Drinker Does not smoke     4.   How would you rate your stress levels?   High    Family History    Is there a family history of menopause-related conditions? (e.g., osteoporosis, breast cancer)  If yes, please specify  No    2.   Is there a family history of heart disease?   If yes, please specify   Yes - Mother Heart disease   --------------------------------------------------------  Mammogram 5/2024  Annual/pa 12/2023  -------------------------------------------------------  Spoke with patient for a total of 30 minutes during virtual  visit.  Patient was guided through expectations and treatment options.     Questions answered. Encouraged to send message or call office with any questions/concerns. Verbalized understanding.       Swapna

## 2025-05-09 DIAGNOSIS — R42 DIZZINESS OF UNKNOWN CAUSE: ICD-10-CM

## 2025-05-09 RX ORDER — MECLIZINE HCL 12.5 MG 12.5 MG/1
12.5 TABLET ORAL 3 TIMES DAILY PRN
Qty: 30 TABLET | Refills: 0 | Status: SHIPPED | OUTPATIENT
Start: 2025-05-09

## 2025-05-09 NOTE — TELEPHONE ENCOUNTER
No care due was identified.  [facility] Embedded Care Due Messages. Reference number: [id number].   5/09/2025 3:13:08 PM CDT

## 2025-05-15 DIAGNOSIS — M79.642 BILATERAL HAND PAIN: Primary | ICD-10-CM

## 2025-05-15 DIAGNOSIS — M79.641 BILATERAL HAND PAIN: Primary | ICD-10-CM

## 2025-05-16 ENCOUNTER — PATIENT MESSAGE (OUTPATIENT)
Dept: ORTHOPEDICS | Facility: CLINIC | Age: 40
End: 2025-05-16
Payer: COMMERCIAL

## 2025-05-19 ENCOUNTER — OFFICE VISIT (OUTPATIENT)
Dept: ORTHOPEDICS | Facility: CLINIC | Age: 40
End: 2025-05-19
Payer: COMMERCIAL

## 2025-05-19 ENCOUNTER — HOSPITAL ENCOUNTER (OUTPATIENT)
Dept: RADIOLOGY | Facility: OTHER | Age: 40
Discharge: HOME | End: 2025-05-19
Attending: ORTHOPAEDIC SURGERY
Payer: COMMERCIAL

## 2025-05-19 VITALS — BODY MASS INDEX: 41.07 KG/M2 | WEIGHT: 246.5 LBS | HEIGHT: 65 IN

## 2025-05-19 DIAGNOSIS — G56.01 RIGHT CARPAL TUNNEL SYNDROME: ICD-10-CM

## 2025-05-19 DIAGNOSIS — M79.641 BILATERAL HAND PAIN: ICD-10-CM

## 2025-05-19 DIAGNOSIS — G56.02 CARPAL TUNNEL SYNDROME OF LEFT WRIST: Primary | ICD-10-CM

## 2025-05-19 DIAGNOSIS — M79.642 BILATERAL HAND PAIN: ICD-10-CM

## 2025-05-19 PROCEDURE — 3044F HG A1C LEVEL LT 7.0%: CPT | Mod: CPTII,S$GLB,, | Performed by: ORTHOPAEDIC SURGERY

## 2025-05-19 PROCEDURE — 99214 OFFICE O/P EST MOD 30 MIN: CPT | Mod: 25,S$GLB,, | Performed by: ORTHOPAEDIC SURGERY

## 2025-05-19 PROCEDURE — 99999 PR PBB SHADOW E&M-EST. PATIENT-LVL III: CPT | Mod: PBBFAC,,, | Performed by: ORTHOPAEDIC SURGERY

## 2025-05-19 PROCEDURE — 73130 X-RAY EXAM OF HAND: CPT | Mod: TC,50,FY

## 2025-05-19 PROCEDURE — 3008F BODY MASS INDEX DOCD: CPT | Mod: CPTII,S$GLB,, | Performed by: ORTHOPAEDIC SURGERY

## 2025-05-19 PROCEDURE — 20526 THER INJECTION CARP TUNNEL: CPT | Mod: 50,S$GLB,, | Performed by: ORTHOPAEDIC SURGERY

## 2025-05-19 PROCEDURE — 73130 X-RAY EXAM OF HAND: CPT | Mod: 26,50,, | Performed by: RADIOLOGY

## 2025-05-19 PROCEDURE — 1159F MED LIST DOCD IN RCRD: CPT | Mod: CPTII,S$GLB,, | Performed by: ORTHOPAEDIC SURGERY

## 2025-05-19 RX ORDER — DEXAMETHASONE SODIUM PHOSPHATE 4 MG/ML
4 INJECTION, SOLUTION INTRA-ARTICULAR; INTRALESIONAL; INTRAMUSCULAR; INTRAVENOUS; SOFT TISSUE
Status: DISCONTINUED | OUTPATIENT
Start: 2025-05-19 | End: 2025-05-19 | Stop reason: HOSPADM

## 2025-05-19 RX ADMIN — DEXAMETHASONE SODIUM PHOSPHATE 4 MG: 4 INJECTION, SOLUTION INTRA-ARTICULAR; INTRALESIONAL; INTRAMUSCULAR; INTRAVENOUS; SOFT TISSUE at 01:05

## 2025-05-19 NOTE — PROGRESS NOTES
[facility]  History & Physical  Orthopedics    SUBJECTIVE:      COVID-19 attestation:  This patient was treated during the COVID-19 pandemic.  This was discussed with the patient, they are aware of our current policies and procedures, were given the option of delaying their visit and or switching to a virtual visit, delaying their surgery when applicable, and they elect to proceed.    Chief Complaint:  Bilateral hand pain numbness and tingling    Referring Provider: No ref. provider found     Interval History 3/7/23:  Patient returns to clinic for f/u on EMG findings which were significant for bilateral moderate severity median nerve mononeuropathy. She states that using the brace at night has provided little relief as has using NSAIDs. She also complains of bilateral thumb locking and thumb base pain that has been present for months. She is interested in receiving an injection at this time and learning more about surgery.     History of Present Illness:  Patient is a 40 y.o. right hand dominant female who presents today with complaints of bilateral hand pain numbness and tingling.  The patient notes that this has been happening for the past month or so.  She notes that this is a recurrence from 2 years ago when she initially had symptoms which then improved with work changes.  She notes that it is really the entirety of her bilateral hands which is involved in the right greater than left hands.  She denies any other issues today presents for initial evaluation.     Interval history January 30, 2024: The patient returns today for re-evaluation.  She notes that her bilateral carpal tunnel injections given last March helped her tremendously for at least 6 months.  Symptoms have only recently returned but they are quite severe and she rates her pain 10/10 today.  She denies other complaints and returns for re-evaluation.    Interval history May 19, 2025: The patient returns today for re-evaluation.   She notes that her prior carpal tunnel injections helped for almost a year but symptoms have unfortunately returned.  She would like to discuss additional options today and has no other complaints.    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    Onset of symptoms/DOI was 1 month ago.    Symptoms are aggravated by activity, movement, and at night.    Symptoms are alleviated by rest.    Symptoms consist of pain and numbness/tingling.    The patient rates their pain as 10/10    Attempted treatment(s) and/or interventions include activity modifications, rest  The patient denies any fevers, chills, N/V, D/C and presents for evaluation.       Past Medical History:   Diagnosis Date    Acne     Anxiety disorder, unspecified 04/30/2020    Gastro-esophageal reflux disease without esophagitis 04/30/2020    Hypertension     Obesity      Past Surgical History:   Procedure Laterality Date    ESOPHAGOGASTRODUODENOSCOPY N/A 05/11/2020    Procedure: EGD (ESOPHAGOGASTRODUODENOSCOPY);  Surgeon: Ramiro Trejo Jr., MD;  Location: [facility];  Service: Endoscopy;  Laterality: N/A;     Review of patient's allergies indicates:  No Known Allergies  Social History     Social History Narrative    ** Merged History Encounter **         . Adolescent children.      Family History   Problem Relation Name Age of Onset    Hypertension Mother Tasneem Freeman     Kidney disease Mother Tasneem Freeman     Heart disease Mother Tasneem Freeman     Diabetes Mother Tasneem Freeman     Early death Mother Tasneem Freeman     Diabetes Father Nikolai Freeman     Hypertension Father Nikolai Freeman     Cancer Father Nikolai Freeman     Stroke Father Nikolai Freeman     Crohn's disease Sister      Ovarian cancer Maternal Aunt      Aneurysm Maternal Grandmother      Heart disease Maternal Grandfather      Heart disease Paternal Grandmother      Breast cancer Neg Hx      Colon cancer Neg Hx      Melanoma Neg Hx           Current  "Outpatient Medications:     fluticasone propionate (FLONASE) 50 mcg/actuation nasal spray, 1 spray (50 mcg total) by Each Nostril route once daily., Disp: 16 g, Rfl: 2    GINGER ROOT-PYRIDOXINE HCL,B6, ORAL, Take by mouth., Disp: , Rfl:     meclizine (ANTIVERT) 12.5 mg tablet, Take 1 tablet (12.5 mg total) by mouth 3 (three) times daily as needed for Dizziness., Disp: 30 tablet, Rfl: 0    omeprazole (PRILOSEC) 40 MG capsule, Take 1 capsule (40 mg total) by mouth once daily., Disp: 30 capsule, Rfl: 11    semaglutide, weight loss, (WEGOVY) 0.25 mg/0.5 mL PnIj, Inject 0.25 mg into the skin every 7 days., Disp: 2 mL, Rfl: 0    semaglutide, weight loss, (WEGOVY) 0.5 mg/0.5 mL PnIj, Inject 0.5 mg into the skin every 7 days., Disp: 2 mL, Rfl: 0    semaglutide, weight loss, (WEGOVY) 1 mg/0.5 mL PnIj, Inject 1 mg into the skin every 7 days., Disp: 2 mL, Rfl: 0    valsartan-hydrochlorothiazide (DIOVAN-HCT) 80-12.5 mg per tablet, Take 1 tablet by mouth once daily., Disp: 90 tablet, Rfl: 0      Review of Systems:  As per HPI otherwise noncontributory    OBJECTIVE:      Vital Signs (Most Recent):  Vitals:    05/19/25 1312   Weight: 111.8 kg (246 lb 7.6 oz)   Height: 5' 5" (1.651 m)       Body mass index is 41.02 kg/m².      Physical Exam:  Constitutional: The patient appears well-developed and well-nourished. No distress.   Skin: No lesions appreciated  Head: Normocephalic and atraumatic.   Nose: Nose normal.   Ears: No deformities seen  Eyes: Conjunctivae and EOM are normal.   Neck: No tracheal deviation present.   Cardiovascular: Normal rate and intact distal pulses.    Pulmonary/Chest: Effort normal. No respiratory distress.   Abdominal: There is no guarding.   Neurological: The patient is alert.   Psychiatric: The patient has a normal mood and affect.     Bilateral Hand/Wrist Examination:    Observation/Inspection:  Swelling  none    Deformity  none  Discoloration  none     Scars   none    Atrophy  none    HAND/WRIST " EXAMINATION:  Finkelstein's Test   Neg  WHAT Test    Neg  Snuff box tenderness   Neg  Mckeon's Test    Neg  Hook of Hamate Tenderness  Neg  CMC grind    Neg  Circumduction test   Neg  Pain to palpation of thumbs bilaterally over A1 pulley    Neurovascular Exam:  Digits WWP, brisk CR < 3s throughout  NVI motor/LTS to M/R/U nerves, radial pulse 2+  Tinel's Test - Carpal Tunnel  positive  Tinel's Test - Cubital Tunnel  Neg  Phalen's Test    Neg  Median Nerve Compression Test positive    ROM hand full, painless    ROM wrist full, painless    ROM elbow full, painless    Abdomen not guarded  Respirations nonlabored  Perfusion intact    Diagnostic Results:     Imaging - I independently viewed the patient's imaging as well as the radiology report.  Xrays of the patient's bilateral hands  demonstrate no evidence of any acute fractures or dislocations or significant degenerative changes.    EMG - bilateral moderate severity median nerve mononeuropathy     ASSESSMENT/PLAN:      40 y.o. yo female with bilateral carpal tunnel syndrome  Plan: The patient and I had a thorough discussion today.  We discussed the working diagnosis as well as several other potential alternative diagnoses.  Treatment options were discussed, both conservative and surgical.  Conservative treatment options would include things such as activity modifications, workplace modifications, a period of rest, oral vs topical OTC and prescription anti-inflammatory medications, occupational therapy, splinting/bracing, immobilization, corticosteroid injections, and others.  Surgical options were discussed as well.     At this time, I once again recommended surgical intervention.  The patient notes that she is finishing online bachelor's and master's program and she can not entertain the idea of surgery at this time.  She understands that is injections are not a substitute for surgical decompression but she would like to proceed with repeat corticosteroid injection  today and follow up when she is ready for surgery.  She understands that depending on when this is we may need to get a new EMG prior to proceeding.        Yrn Richards M.D.    Please be aware that this note has been generated with the assistance of [employer] voice-to-text.  Please excuse any spelling or grammatical errors.    Thank you for choosing Dr. Yrn Richards for your orthopedic hand and upper extremity care. It is our goal to provide you with exceptional care that will help keep you healthy, active, and get you back in the game.     If you felt that you received exemplary care today, please consider leaving feedback for Dr. Richards on [employer] at [email or web address].    Please do not hesitate to reach out to us via email, phone, or MyChart with any questions, concerns, or feedback.

## 2025-05-19 NOTE — PROCEDURES
Carpal Tunnel: R carpal tunnel    Date/Time: 5/19/2025 1:45 PM    Performed by: Yrn Richards MD  Authorized by: Yrn Richards MD    Consent Done?:  Yes (Verbal)  Site marked: the procedure site was marked    Timeout: prior to procedure the correct patient, procedure, and site was verified    Prep: patient was prepped and draped in usual sterile fashion      Local anesthesia used?: Yes    Local anesthetic:  Topical anesthetic  Location:  Wrist (left carpal tunnel)  Site:  R carpal tunnel  Ultrasonic Guidance for Needle Placement?: No    Needle size:  25 G  Approach:  Volar  Medications:  4 mg dexAMETHasone 4 mg/mL  Patient tolerance:  Patient tolerated the procedure well with no immediate complications

## 2025-05-19 NOTE — PROCEDURES
Carpal Tunnel: L carpal tunnel    Date/Time: 5/19/2025 1:45 PM    Performed by: Yrn Richards MD  Authorized by: Yrn Richards MD    Consent Done?:  Yes (Verbal)  Site marked: the procedure site was marked    Timeout: prior to procedure the correct patient, procedure, and site was verified    Prep: patient was prepped and draped in usual sterile fashion      Local anesthesia used?: Yes    Local anesthetic:  Topical anesthetic  Location:  Wrist (left carpal tunnel)  Site:  L carpal tunnel  Ultrasonic Guidance for Needle Placement?: No    Needle size:  25 G  Approach:  Volar  Medications:  4 mg dexAMETHasone 4 mg/mL  Patient tolerance:  Patient tolerated the procedure well with no immediate complications

## 2025-06-02 RX ORDER — VALSARTAN AND HYDROCHLOROTHIAZIDE 80; 12.5 MG/1; MG/1
1 TABLET, FILM COATED ORAL DAILY
Qty: 90 TABLET | Refills: 0 | Status: SHIPPED | OUTPATIENT
Start: 2025-06-02

## 2025-07-10 ENCOUNTER — HOSPITAL ENCOUNTER (OUTPATIENT)
Dept: RADIOLOGY | Facility: HOSPITAL | Age: 40
Discharge: HOME | End: 2025-07-10
Attending: INTERNAL MEDICINE
Payer: COMMERCIAL

## 2025-07-10 DIAGNOSIS — Z12.31 ENCOUNTER FOR SCREENING MAMMOGRAM FOR BREAST CANCER: ICD-10-CM

## 2025-07-10 PROCEDURE — 77067 SCR MAMMO BI INCL CAD: CPT | Mod: TC

## 2025-07-16 ENCOUNTER — TELEPHONE (OUTPATIENT)
Dept: INTERNAL MEDICINE | Facility: CLINIC | Age: 40
End: 2025-07-16
Payer: COMMERCIAL

## 2025-07-16 NOTE — TELEPHONE ENCOUNTER
----- Message from Pharmacist Cachorro sent at 7/16/2025  9:31 AM CDT -----  Regarding: Wegovy Discontinued  Patient stated that this is not an affordable option and cannot continue with the medication. Informed them that we will close them out at [facility], discontinue Wegovy prescriptions, and let [facility] know. Also informed pt that he/she can continue in dietary/lifestyle services and that if in the future the medication is affordable they need to reach out to DM to get enrolled in the program again. Voiced understanding and had no other questions.

## 2025-07-16 NOTE — TELEPHONE ENCOUNTER
Patient has been closed out of  MTM program due to financial burden. Programs updated, medications discontinued, and future visits canceled. Pt is welcome to re-enroll in  MTM program at later date if they would like.